# Patient Record
Sex: MALE | Race: WHITE | Employment: FULL TIME | URBAN - NONMETROPOLITAN AREA
[De-identification: names, ages, dates, MRNs, and addresses within clinical notes are randomized per-mention and may not be internally consistent; named-entity substitution may affect disease eponyms.]

---

## 2017-04-12 DIAGNOSIS — M51.26 LUMBAR HERNIATED DISC: ICD-10-CM

## 2017-04-12 DIAGNOSIS — M54.42 ACUTE LEFT-SIDED LOW BACK PAIN WITH LEFT-SIDED SCIATICA: ICD-10-CM

## 2017-04-12 RX ORDER — TIZANIDINE 4 MG/1
4 TABLET ORAL 3 TIMES DAILY
Qty: 30 TABLET | Refills: 0 | Status: SHIPPED | OUTPATIENT
Start: 2017-04-12 | End: 2017-08-29 | Stop reason: ALTCHOICE

## 2017-05-08 DIAGNOSIS — F41.9 ANXIETY: ICD-10-CM

## 2017-05-08 DIAGNOSIS — R06.2 WHEEZE: ICD-10-CM

## 2017-05-08 RX ORDER — ALBUTEROL SULFATE 2.5 MG/3ML
2.5 SOLUTION RESPIRATORY (INHALATION) EVERY 4 HOURS PRN
Qty: 50 VIAL | Refills: 3 | Status: SHIPPED | OUTPATIENT
Start: 2017-05-08 | End: 2019-02-06 | Stop reason: SDUPTHER

## 2017-05-08 RX ORDER — SERTRALINE HYDROCHLORIDE 25 MG/1
25 TABLET, FILM COATED ORAL DAILY
Qty: 30 TABLET | Refills: 3 | Status: SHIPPED | OUTPATIENT
Start: 2017-05-08 | End: 2017-10-11 | Stop reason: SDUPTHER

## 2017-06-30 ENCOUNTER — APPOINTMENT (OUTPATIENT)
Dept: GENERAL RADIOLOGY | Age: 47
End: 2017-06-30
Payer: COMMERCIAL

## 2017-06-30 ENCOUNTER — HOSPITAL ENCOUNTER (EMERGENCY)
Age: 47
Discharge: ANOTHER HOSPITAL | End: 2017-06-30
Attending: FAMILY MEDICINE
Payer: COMMERCIAL

## 2017-06-30 ENCOUNTER — HOSPITAL ENCOUNTER (INPATIENT)
Age: 47
LOS: 2 days | Discharge: HOME | DRG: 192 | End: 2017-07-02
Attending: INTERNAL MEDICINE | Admitting: INTERNAL MEDICINE
Payer: COMMERCIAL

## 2017-06-30 VITALS
DIASTOLIC BLOOD PRESSURE: 89 MMHG | RESPIRATION RATE: 19 BRPM | WEIGHT: 240 LBS | HEIGHT: 72 IN | SYSTOLIC BLOOD PRESSURE: 146 MMHG | OXYGEN SATURATION: 96 % | HEART RATE: 74 BPM | BODY MASS INDEX: 32.51 KG/M2 | TEMPERATURE: 97.8 F

## 2017-06-30 DIAGNOSIS — Z86.79 HISTORY OF CORONARY ARTERY DISEASE: ICD-10-CM

## 2017-06-30 DIAGNOSIS — Z95.5 HISTORY OF CORONARY ARTERY STENT PLACEMENT: ICD-10-CM

## 2017-06-30 DIAGNOSIS — I20.0 UNSTABLE ANGINA (HCC): Primary | ICD-10-CM

## 2017-06-30 LAB
ABSOLUTE EOS #: 0.2 K/UL (ref 0–0.4)
ABSOLUTE LYMPH #: 2 K/UL (ref 1–4.8)
ABSOLUTE MONO #: 0.5 K/UL (ref 0–1)
ANION GAP SERPL CALCULATED.3IONS-SCNC: 14 MMOL/L (ref 9–17)
BASOPHILS # BLD: 1 %
BASOPHILS ABSOLUTE: 0 K/UL (ref 0–0.2)
BNP INTERPRETATION: NORMAL
BUN BLDV-MCNC: 11 MG/DL (ref 6–20)
BUN/CREAT BLD: 14 (ref 9–20)
CALCIUM SERPL-MCNC: 9.7 MG/DL (ref 8.6–10.4)
CHLORIDE BLD-SCNC: 98 MMOL/L (ref 98–107)
CO2: 25 MMOL/L (ref 20–31)
CREAT SERPL-MCNC: 0.76 MG/DL (ref 0.7–1.2)
D-DIMER QUANTITATIVE: 0.5 MG/L FEU (ref 0.19–0.5)
DIFFERENTIAL TYPE: NORMAL
EOSINOPHILS RELATIVE PERCENT: 2 %
GFR AFRICAN AMERICAN: >60 ML/MIN
GFR NON-AFRICAN AMERICAN: >60 ML/MIN
GFR SERPL CREATININE-BSD FRML MDRD: ABNORMAL ML/MIN/{1.73_M2}
GFR SERPL CREATININE-BSD FRML MDRD: ABNORMAL ML/MIN/{1.73_M2}
GLUCOSE BLD-MCNC: 138 MG/DL (ref 70–99)
HCT VFR BLD CALC: 42.5 % (ref 41–53)
HEMOGLOBIN: 14.6 G/DL (ref 13.5–17)
INR BLD: 1 (ref 0.9–1.2)
LYMPHOCYTES # BLD: 25 %
MCH RBC QN AUTO: 32.2 PG (ref 26–34)
MCHC RBC AUTO-ENTMCNC: 34.4 G/DL (ref 31–37)
MCV RBC AUTO: 93.8 FL (ref 80–100)
MONOCYTES # BLD: 6 %
PDW BLD-RTO: 13.1 % (ref 12.1–15.2)
PLATELET # BLD: 195 K/UL (ref 140–450)
PLATELET ESTIMATE: NORMAL
PMV BLD AUTO: NORMAL FL (ref 6–12)
POTASSIUM SERPL-SCNC: 4.1 MMOL/L (ref 3.7–5.3)
PRO-BNP: 54 PG/ML
PROTHROMBIN TIME: 9.8 SEC (ref 9.7–12.2)
RBC # BLD: 4.53 M/UL (ref 4.5–5.9)
RBC # BLD: NORMAL 10*6/UL
SEG NEUTROPHILS: 66 %
SEGMENTED NEUTROPHILS ABSOLUTE COUNT: 5.5 K/UL (ref 1.8–7.7)
SODIUM BLD-SCNC: 137 MMOL/L (ref 135–144)
TROPONIN INTERP: NORMAL
TROPONIN T: <0.03 NG/ML
WBC # BLD: 8.2 K/UL (ref 3.5–11)
WBC # BLD: NORMAL 10*3/UL

## 2017-06-30 PROCEDURE — 84484 ASSAY OF TROPONIN QUANT: CPT

## 2017-06-30 PROCEDURE — 36415 COLL VENOUS BLD VENIPUNCTURE: CPT

## 2017-06-30 PROCEDURE — 6360000002 HC RX W HCPCS: Performed by: FAMILY MEDICINE

## 2017-06-30 PROCEDURE — 1200000000 HC SEMI PRIVATE

## 2017-06-30 PROCEDURE — 2500000003 HC RX 250 WO HCPCS: Performed by: FAMILY MEDICINE

## 2017-06-30 PROCEDURE — 71010 XR CHEST PORTABLE: CPT

## 2017-06-30 PROCEDURE — 85379 FIBRIN DEGRADATION QUANT: CPT

## 2017-06-30 PROCEDURE — 6370000000 HC RX 637 (ALT 250 FOR IP): Performed by: FAMILY MEDICINE

## 2017-06-30 PROCEDURE — 85610 PROTHROMBIN TIME: CPT

## 2017-06-30 PROCEDURE — 96375 TX/PRO/DX INJ NEW DRUG ADDON: CPT

## 2017-06-30 PROCEDURE — 80048 BASIC METABOLIC PNL TOTAL CA: CPT

## 2017-06-30 PROCEDURE — G0378 HOSPITAL OBSERVATION PER HR: HCPCS

## 2017-06-30 PROCEDURE — 85025 COMPLETE CBC W/AUTO DIFF WBC: CPT

## 2017-06-30 PROCEDURE — 93005 ELECTROCARDIOGRAM TRACING: CPT

## 2017-06-30 PROCEDURE — 83880 ASSAY OF NATRIURETIC PEPTIDE: CPT

## 2017-06-30 PROCEDURE — 99285 EMERGENCY DEPT VISIT HI MDM: CPT

## 2017-06-30 PROCEDURE — 96365 THER/PROPH/DIAG IV INF INIT: CPT

## 2017-06-30 RX ORDER — SERTRALINE HYDROCHLORIDE 25 MG/1
25 TABLET, FILM COATED ORAL DAILY
Status: DISCONTINUED | OUTPATIENT
Start: 2017-07-01 | End: 2017-07-02 | Stop reason: HOSPADM

## 2017-06-30 RX ORDER — LORAZEPAM 2 MG/ML
0.5 INJECTION INTRAMUSCULAR ONCE
Status: COMPLETED | OUTPATIENT
Start: 2017-06-30 | End: 2017-06-30

## 2017-06-30 RX ORDER — LOSARTAN POTASSIUM AND HYDROCHLOROTHIAZIDE 25; 100 MG/1; MG/1
1 TABLET ORAL DAILY
Status: DISCONTINUED | OUTPATIENT
Start: 2017-07-01 | End: 2017-06-30 | Stop reason: CLARIF

## 2017-06-30 RX ORDER — CLOPIDOGREL BISULFATE 75 MG/1
75 TABLET ORAL DAILY
Status: DISCONTINUED | OUTPATIENT
Start: 2017-07-01 | End: 2017-07-02 | Stop reason: HOSPADM

## 2017-06-30 RX ORDER — POTASSIUM CHLORIDE 20 MEQ/1
40 TABLET, EXTENDED RELEASE ORAL PRN
Status: DISCONTINUED | OUTPATIENT
Start: 2017-06-30 | End: 2017-07-02 | Stop reason: HOSPADM

## 2017-06-30 RX ORDER — SODIUM CHLORIDE 0.9 % (FLUSH) 0.9 %
10 SYRINGE (ML) INJECTION PRN
Status: DISCONTINUED | OUTPATIENT
Start: 2017-06-30 | End: 2017-07-02 | Stop reason: HOSPADM

## 2017-06-30 RX ORDER — SODIUM CHLORIDE 9 MG/ML
INJECTION, SOLUTION INTRAVENOUS CONTINUOUS
Status: DISCONTINUED | OUTPATIENT
Start: 2017-06-30 | End: 2017-07-02 | Stop reason: HOSPADM

## 2017-06-30 RX ORDER — NITROGLYCERIN 20 MG/100ML
5 INJECTION INTRAVENOUS CONTINUOUS
Status: DISCONTINUED | OUTPATIENT
Start: 2017-06-30 | End: 2017-07-02 | Stop reason: HOSPADM

## 2017-06-30 RX ORDER — ALBUTEROL SULFATE 2.5 MG/3ML
2.5 SOLUTION RESPIRATORY (INHALATION) EVERY 6 HOURS PRN
Status: DISCONTINUED | OUTPATIENT
Start: 2017-06-30 | End: 2017-07-02 | Stop reason: HOSPADM

## 2017-06-30 RX ORDER — POTASSIUM CHLORIDE 7.45 MG/ML
10 INJECTION INTRAVENOUS PRN
Status: DISCONTINUED | OUTPATIENT
Start: 2017-06-30 | End: 2017-07-02 | Stop reason: HOSPADM

## 2017-06-30 RX ORDER — MORPHINE SULFATE 4 MG/ML
4 INJECTION, SOLUTION INTRAMUSCULAR; INTRAVENOUS
Status: DISCONTINUED | OUTPATIENT
Start: 2017-06-30 | End: 2017-07-02 | Stop reason: HOSPADM

## 2017-06-30 RX ORDER — BISACODYL 10 MG
10 SUPPOSITORY, RECTAL RECTAL DAILY PRN
Status: DISCONTINUED | OUTPATIENT
Start: 2017-06-30 | End: 2017-07-02 | Stop reason: HOSPADM

## 2017-06-30 RX ORDER — SODIUM CHLORIDE 0.9 % (FLUSH) 0.9 %
10 SYRINGE (ML) INJECTION EVERY 12 HOURS SCHEDULED
Status: DISCONTINUED | OUTPATIENT
Start: 2017-06-30 | End: 2017-07-02 | Stop reason: HOSPADM

## 2017-06-30 RX ORDER — MAGNESIUM SULFATE 1 G/100ML
1 INJECTION INTRAVENOUS PRN
Status: DISCONTINUED | OUTPATIENT
Start: 2017-06-30 | End: 2017-07-02 | Stop reason: HOSPADM

## 2017-06-30 RX ORDER — ACETAMINOPHEN 325 MG/1
650 TABLET ORAL EVERY 4 HOURS PRN
Status: DISCONTINUED | OUTPATIENT
Start: 2017-06-30 | End: 2017-07-02 | Stop reason: HOSPADM

## 2017-06-30 RX ORDER — ALBUTEROL SULFATE 2.5 MG/3ML
2.5 SOLUTION RESPIRATORY (INHALATION)
Status: DISCONTINUED | OUTPATIENT
Start: 2017-06-30 | End: 2017-06-30

## 2017-06-30 RX ORDER — LOSARTAN POTASSIUM 50 MG/1
100 TABLET ORAL DAILY
Status: DISCONTINUED | OUTPATIENT
Start: 2017-07-01 | End: 2017-07-02 | Stop reason: HOSPADM

## 2017-06-30 RX ORDER — ASPIRIN 81 MG/1
324 TABLET, CHEWABLE ORAL ONCE
Status: COMPLETED | OUTPATIENT
Start: 2017-06-30 | End: 2017-06-30

## 2017-06-30 RX ORDER — HYDROCODONE BITARTRATE AND ACETAMINOPHEN 5; 325 MG/1; MG/1
1 TABLET ORAL EVERY 4 HOURS PRN
Status: DISCONTINUED | OUTPATIENT
Start: 2017-06-30 | End: 2017-07-02 | Stop reason: HOSPADM

## 2017-06-30 RX ORDER — NITROGLYCERIN 20 MG/100ML
5 INJECTION INTRAVENOUS CONTINUOUS
Status: DISCONTINUED | OUTPATIENT
Start: 2017-06-30 | End: 2017-06-30 | Stop reason: HOSPADM

## 2017-06-30 RX ORDER — HYDROCHLOROTHIAZIDE 25 MG/1
25 TABLET ORAL DAILY
Status: DISCONTINUED | OUTPATIENT
Start: 2017-07-01 | End: 2017-07-02 | Stop reason: HOSPADM

## 2017-06-30 RX ORDER — ONDANSETRON 2 MG/ML
4 INJECTION INTRAMUSCULAR; INTRAVENOUS EVERY 6 HOURS PRN
Status: DISCONTINUED | OUTPATIENT
Start: 2017-06-30 | End: 2017-07-02 | Stop reason: HOSPADM

## 2017-06-30 RX ORDER — MORPHINE SULFATE 2 MG/ML
2 INJECTION, SOLUTION INTRAMUSCULAR; INTRAVENOUS
Status: DISCONTINUED | OUTPATIENT
Start: 2017-06-30 | End: 2017-07-02 | Stop reason: HOSPADM

## 2017-06-30 RX ORDER — DOCUSATE SODIUM 100 MG/1
100 CAPSULE, LIQUID FILLED ORAL 2 TIMES DAILY
Status: DISCONTINUED | OUTPATIENT
Start: 2017-06-30 | End: 2017-07-02 | Stop reason: HOSPADM

## 2017-06-30 RX ORDER — NEBIVOLOL 5 MG/1
10 TABLET ORAL DAILY
Status: DISCONTINUED | OUTPATIENT
Start: 2017-07-01 | End: 2017-07-02 | Stop reason: HOSPADM

## 2017-06-30 RX ORDER — PANTOPRAZOLE SODIUM 40 MG/1
40 TABLET, DELAYED RELEASE ORAL DAILY
Status: DISCONTINUED | OUTPATIENT
Start: 2017-07-01 | End: 2017-07-02 | Stop reason: HOSPADM

## 2017-06-30 RX ORDER — POTASSIUM CHLORIDE 20MEQ/15ML
40 LIQUID (ML) ORAL PRN
Status: DISCONTINUED | OUTPATIENT
Start: 2017-06-30 | End: 2017-07-02 | Stop reason: HOSPADM

## 2017-06-30 RX ADMIN — ASPIRIN 81 MG 324 MG: 81 TABLET ORAL at 19:01

## 2017-06-30 RX ADMIN — NITROGLYCERIN 0.5 INCH: 20 OINTMENT TOPICAL at 19:01

## 2017-06-30 RX ADMIN — LORAZEPAM 0.5 MG: 2 INJECTION INTRAMUSCULAR; INTRAVENOUS at 20:34

## 2017-06-30 RX ADMIN — NITROGLYCERIN 5 MCG/MIN: 20 INJECTION INTRAVENOUS at 19:57

## 2017-06-30 ASSESSMENT — PAIN SCALES - GENERAL
PAINLEVEL_OUTOF10: 4
PAINLEVEL_OUTOF10: 3
PAINLEVEL_OUTOF10: 4

## 2017-06-30 ASSESSMENT — PAIN DESCRIPTION - ORIENTATION
ORIENTATION: LEFT;MID
ORIENTATION: MID;LEFT

## 2017-06-30 ASSESSMENT — PAIN DESCRIPTION - DESCRIPTORS
DESCRIPTORS: HEAVINESS
DESCRIPTORS: DISCOMFORT;CONSTANT

## 2017-06-30 ASSESSMENT — PAIN DESCRIPTION - ONSET: ONSET: ON-GOING

## 2017-06-30 ASSESSMENT — PAIN DESCRIPTION - PAIN TYPE
TYPE: ACUTE PAIN

## 2017-06-30 ASSESSMENT — PAIN DESCRIPTION - DIRECTION: RADIATING_TOWARDS: LEFT BACK

## 2017-06-30 ASSESSMENT — PAIN DESCRIPTION - FREQUENCY: FREQUENCY: CONTINUOUS

## 2017-06-30 ASSESSMENT — PAIN DESCRIPTION - LOCATION
LOCATION: CHEST

## 2017-06-30 ASSESSMENT — PAIN DESCRIPTION - PROGRESSION: CLINICAL_PROGRESSION: GRADUALLY IMPROVING

## 2017-07-01 PROBLEM — I20.0 UNSTABLE ANGINA (HCC): Status: ACTIVE | Noted: 2017-07-01

## 2017-07-01 PROBLEM — R07.2 PRECORDIAL CHEST PAIN: Status: ACTIVE | Noted: 2017-07-01

## 2017-07-01 PROBLEM — F17.200 SMOKER: Status: ACTIVE | Noted: 2017-07-01

## 2017-07-01 LAB
ALBUMIN SERPL-MCNC: 4 G/DL (ref 3.5–5.2)
ALBUMIN/GLOBULIN RATIO: 1.5 (ref 1–2.5)
ALP BLD-CCNC: 48 U/L (ref 40–129)
ALT SERPL-CCNC: 28 U/L (ref 5–41)
ANION GAP SERPL CALCULATED.3IONS-SCNC: 15 MMOL/L (ref 9–17)
AST SERPL-CCNC: 21 U/L
BILIRUB SERPL-MCNC: 0.41 MG/DL (ref 0.3–1.2)
BUN BLDV-MCNC: 9 MG/DL (ref 6–20)
BUN/CREAT BLD: ABNORMAL (ref 9–20)
CALCIUM SERPL-MCNC: 9 MG/DL (ref 8.6–10.4)
CHLORIDE BLD-SCNC: 100 MMOL/L (ref 98–107)
CHOLESTEROL/HDL RATIO: 3.3
CHOLESTEROL: 166 MG/DL
CO2: 25 MMOL/L (ref 20–31)
CREAT SERPL-MCNC: 0.62 MG/DL (ref 0.7–1.2)
EKG ATRIAL RATE: 71 BPM
EKG P AXIS: 40 DEGREES
EKG P-R INTERVAL: 166 MS
EKG Q-T INTERVAL: 378 MS
EKG QRS DURATION: 82 MS
EKG QTC CALCULATION (BAZETT): 410 MS
EKG R AXIS: 27 DEGREES
EKG T AXIS: 26 DEGREES
EKG VENTRICULAR RATE: 71 BPM
GFR AFRICAN AMERICAN: >60 ML/MIN
GFR NON-AFRICAN AMERICAN: >60 ML/MIN
GFR SERPL CREATININE-BSD FRML MDRD: ABNORMAL ML/MIN/{1.73_M2}
GFR SERPL CREATININE-BSD FRML MDRD: ABNORMAL ML/MIN/{1.73_M2}
GLUCOSE BLD-MCNC: 134 MG/DL (ref 70–99)
HCT VFR BLD CALC: 40.2 % (ref 41–53)
HDLC SERPL-MCNC: 50 MG/DL
HEMOGLOBIN: 13.6 G/DL (ref 13.5–17.5)
LDL CHOLESTEROL: 61 MG/DL (ref 0–130)
MCH RBC QN AUTO: 31.9 PG (ref 26–34)
MCHC RBC AUTO-ENTMCNC: 33.9 G/DL (ref 31–37)
MCV RBC AUTO: 94.1 FL (ref 80–100)
PDW BLD-RTO: 13.1 % (ref 12.5–15.4)
PLATELET # BLD: 184 K/UL (ref 140–450)
PMV BLD AUTO: 8.7 FL (ref 6–12)
POTASSIUM SERPL-SCNC: 4 MMOL/L (ref 3.7–5.3)
RBC # BLD: 4.27 M/UL (ref 4.5–5.9)
SODIUM BLD-SCNC: 140 MMOL/L (ref 135–144)
TOTAL PROTEIN: 6.7 G/DL (ref 6.4–8.3)
TRIGL SERPL-MCNC: 277 MG/DL
TROPONIN INTERP: NORMAL
TROPONIN INTERP: NORMAL
TROPONIN T: <0.03 NG/ML
TROPONIN T: <0.03 NG/ML
VLDLC SERPL CALC-MCNC: ABNORMAL MG/DL (ref 1–30)
WBC # BLD: 6.6 K/UL (ref 3.5–11)

## 2017-07-01 PROCEDURE — 2580000003 HC RX 258: Performed by: NURSE PRACTITIONER

## 2017-07-01 PROCEDURE — 80061 LIPID PANEL: CPT

## 2017-07-01 PROCEDURE — 99223 1ST HOSP IP/OBS HIGH 75: CPT | Performed by: FAMILY MEDICINE

## 2017-07-01 PROCEDURE — 93005 ELECTROCARDIOGRAM TRACING: CPT

## 2017-07-01 PROCEDURE — 6360000002 HC RX W HCPCS: Performed by: NURSE PRACTITIONER

## 2017-07-01 PROCEDURE — 85027 COMPLETE CBC AUTOMATED: CPT

## 2017-07-01 PROCEDURE — 2500000003 HC RX 250 WO HCPCS: Performed by: NURSE PRACTITIONER

## 2017-07-01 PROCEDURE — 36415 COLL VENOUS BLD VENIPUNCTURE: CPT

## 2017-07-01 PROCEDURE — 1200000000 HC SEMI PRIVATE

## 2017-07-01 PROCEDURE — G0378 HOSPITAL OBSERVATION PER HR: HCPCS

## 2017-07-01 PROCEDURE — 80053 COMPREHEN METABOLIC PANEL: CPT

## 2017-07-01 PROCEDURE — 84484 ASSAY OF TROPONIN QUANT: CPT

## 2017-07-01 PROCEDURE — 6370000000 HC RX 637 (ALT 250 FOR IP): Performed by: NURSE PRACTITIONER

## 2017-07-01 RX ORDER — NICOTINE 21 MG/24HR
1 PATCH, TRANSDERMAL 24 HOURS TRANSDERMAL DAILY
Status: DISCONTINUED | OUTPATIENT
Start: 2017-07-01 | End: 2017-07-02 | Stop reason: HOSPADM

## 2017-07-01 RX ADMIN — DOCUSATE SODIUM 100 MG: 100 CAPSULE ORAL at 09:47

## 2017-07-01 RX ADMIN — SERTRALINE 25 MG: 25 TABLET, FILM COATED ORAL at 09:51

## 2017-07-01 RX ADMIN — ASPIRIN 325 MG: 325 TABLET, COATED ORAL at 09:50

## 2017-07-01 RX ADMIN — HYDROCHLOROTHIAZIDE 25 MG: 25 TABLET ORAL at 09:51

## 2017-07-01 RX ADMIN — CLOPIDOGREL 75 MG: 75 TABLET, FILM COATED ORAL at 09:48

## 2017-07-01 RX ADMIN — NITROGLYCERIN 20 MCG/MIN: 20 INJECTION INTRAVENOUS at 04:40

## 2017-07-01 RX ADMIN — NEBIVOLOL HYDROCHLORIDE 10 MG: 5 TABLET ORAL at 09:50

## 2017-07-01 RX ADMIN — SODIUM CHLORIDE: 9 INJECTION, SOLUTION INTRAVENOUS at 04:41

## 2017-07-01 RX ADMIN — PANTOPRAZOLE SODIUM 40 MG: 40 TABLET, DELAYED RELEASE ORAL at 09:47

## 2017-07-01 RX ADMIN — Medication 10 ML: at 22:16

## 2017-07-01 RX ADMIN — LOSARTAN POTASSIUM 100 MG: 50 TABLET, FILM COATED ORAL at 09:48

## 2017-07-01 RX ADMIN — ENOXAPARIN SODIUM 40 MG: 40 INJECTION SUBCUTANEOUS at 09:51

## 2017-07-01 ASSESSMENT — ENCOUNTER SYMPTOMS
CHEST TIGHTNESS: 1
SINUS PRESSURE: 0
RECTAL PAIN: 0
BACK PAIN: 0
ABDOMINAL PAIN: 0
VOMITING: 0
BLOOD IN STOOL: 0
COUGH: 0
SORE THROAT: 0
DIARRHEA: 0
SHORTNESS OF BREATH: 0
NAUSEA: 0
WHEEZING: 0
EYE PAIN: 0
CONSTIPATION: 0

## 2017-07-01 ASSESSMENT — PAIN SCALES - GENERAL
PAINLEVEL_OUTOF10: 2
PAINLEVEL_OUTOF10: 0
PAINLEVEL_OUTOF10: 1
PAINLEVEL_OUTOF10: 0
PAINLEVEL_OUTOF10: 1

## 2017-07-01 ASSESSMENT — PAIN DESCRIPTION - PAIN TYPE
TYPE: ACUTE PAIN
TYPE: ACUTE PAIN

## 2017-07-01 ASSESSMENT — PAIN DESCRIPTION - DIRECTION
RADIATING_TOWARDS: ABDOMEN, BACK
RADIATING_TOWARDS: BACK, ABDOMEN

## 2017-07-01 ASSESSMENT — PAIN DESCRIPTION - PROGRESSION
CLINICAL_PROGRESSION: GRADUALLY IMPROVING

## 2017-07-01 ASSESSMENT — PAIN DESCRIPTION - DESCRIPTORS
DESCRIPTORS: HEAVINESS
DESCRIPTORS: HEAVINESS

## 2017-07-01 ASSESSMENT — PAIN DESCRIPTION - ONSET
ONSET: OTHER (COMMENT)
ONSET: OTHER (COMMENT)

## 2017-07-01 ASSESSMENT — PAIN DESCRIPTION - ORIENTATION
ORIENTATION: MID
ORIENTATION: MID

## 2017-07-01 ASSESSMENT — PAIN DESCRIPTION - LOCATION
LOCATION: CHEST
LOCATION: CHEST

## 2017-07-02 VITALS
HEART RATE: 66 BPM | TEMPERATURE: 98.3 F | HEIGHT: 72 IN | SYSTOLIC BLOOD PRESSURE: 130 MMHG | RESPIRATION RATE: 20 BRPM | DIASTOLIC BLOOD PRESSURE: 62 MMHG | BODY MASS INDEX: 33.68 KG/M2 | WEIGHT: 248.68 LBS | OXYGEN SATURATION: 93 %

## 2017-07-02 PROBLEM — J20.9 ACUTE BRONCHITIS: Status: ACTIVE | Noted: 2017-07-02

## 2017-07-02 PROCEDURE — 99239 HOSP IP/OBS DSCHRG MGMT >30: CPT | Performed by: FAMILY MEDICINE

## 2017-07-02 PROCEDURE — G0378 HOSPITAL OBSERVATION PER HR: HCPCS

## 2017-07-02 RX ORDER — AZITHROMYCIN 250 MG/1
500 TABLET, FILM COATED ORAL ONCE
Status: DISCONTINUED | OUTPATIENT
Start: 2017-07-02 | End: 2017-07-02

## 2017-07-02 RX ORDER — NICOTINE 21 MG/24HR
1 PATCH, TRANSDERMAL 24 HOURS TRANSDERMAL DAILY
Qty: 30 PATCH | Refills: 3 | Status: SHIPPED | OUTPATIENT
Start: 2017-07-02 | End: 2017-10-23 | Stop reason: ALTCHOICE

## 2017-07-02 RX ORDER — AZITHROMYCIN 250 MG/1
TABLET, FILM COATED ORAL
Qty: 1 PACKET | Refills: 0 | Status: SHIPPED | OUTPATIENT
Start: 2017-07-02 | End: 2017-07-12

## 2017-07-02 RX ORDER — ASPIRIN 81 MG/1
81 TABLET ORAL DAILY
Qty: 30 TABLET | Refills: 3 | Status: SHIPPED | OUTPATIENT
Start: 2017-07-02

## 2017-07-02 ASSESSMENT — ENCOUNTER SYMPTOMS
SHORTNESS OF BREATH: 0
CONSTIPATION: 0
NAUSEA: 0
ABDOMINAL PAIN: 0
WHEEZING: 0
COUGH: 1
VOMITING: 0
DIARRHEA: 0

## 2017-07-03 LAB
EKG ATRIAL RATE: 78 BPM
EKG P AXIS: 52 DEGREES
EKG P-R INTERVAL: 164 MS
EKG Q-T INTERVAL: 374 MS
EKG QRS DURATION: 90 MS
EKG QTC CALCULATION (BAZETT): 426 MS
EKG R AXIS: 39 DEGREES
EKG T AXIS: 40 DEGREES
EKG VENTRICULAR RATE: 78 BPM

## 2017-08-29 ENCOUNTER — TELEPHONE (OUTPATIENT)
Dept: PHARMACY | Facility: CLINIC | Age: 47
End: 2017-08-29

## 2017-09-01 ENCOUNTER — TELEPHONE (OUTPATIENT)
Dept: FAMILY MEDICINE CLINIC | Age: 47
End: 2017-09-01

## 2017-09-01 DIAGNOSIS — J44.9 CHRONIC OBSTRUCTIVE PULMONARY DISEASE, UNSPECIFIED COPD TYPE (HCC): Primary | ICD-10-CM

## 2017-09-01 DIAGNOSIS — J44.1 COPD EXACERBATION (HCC): ICD-10-CM

## 2017-09-01 RX ORDER — METHYLPREDNISOLONE 4 MG/1
TABLET ORAL
Qty: 1 KIT | Refills: 0 | Status: SHIPPED | OUTPATIENT
Start: 2017-09-01 | End: 2017-09-07

## 2017-09-01 RX ORDER — AZITHROMYCIN 250 MG/1
TABLET, FILM COATED ORAL
Qty: 6 TABLET | Refills: 0 | Status: SHIPPED | OUTPATIENT
Start: 2017-09-01 | End: 2017-09-11

## 2017-10-11 DIAGNOSIS — Z00.00 ROUTINE HEALTH MAINTENANCE: ICD-10-CM

## 2017-10-11 DIAGNOSIS — F41.9 ANXIETY: ICD-10-CM

## 2017-10-11 DIAGNOSIS — I10 ESSENTIAL HYPERTENSION: Primary | Chronic | ICD-10-CM

## 2017-10-11 DIAGNOSIS — E78.1 HYPERTRIGLYCERIDEMIA: Chronic | ICD-10-CM

## 2017-10-11 DIAGNOSIS — R73.01 IMPAIRED FASTING BLOOD SUGAR: ICD-10-CM

## 2017-10-11 RX ORDER — SERTRALINE HYDROCHLORIDE 25 MG/1
25 TABLET, FILM COATED ORAL DAILY
Qty: 30 TABLET | Refills: 3 | Status: SHIPPED | OUTPATIENT
Start: 2017-10-11 | End: 2018-03-09 | Stop reason: SDUPTHER

## 2017-10-11 NOTE — TELEPHONE ENCOUNTER
LOV 11/24/15  LRF 5/8/17    Patient due for follow up and labs. Patient's wife will schedule physical.   Health Maintenance   Topic Date Due    HIV screen  01/30/1987    DTaP/Tdap/Td vaccine (1 - Tdap) 01/30/1991    Flu vaccine (1) 09/01/2017    Diabetes screen  11/24/2018    Lipid screen  07/01/2022    Pneumococcal med risk  Completed             (applicable per patient's age: Cancer Screenings, Depression Screening, Fall Risk Screening, Immunizations)    Hemoglobin A1C (%)   Date Value   11/24/2015 6.3 (H)     LDL Cholesterol (mg/dL)   Date Value   07/01/2017 61     AST (U/L)   Date Value   07/01/2017 21     ALT (U/L)   Date Value   07/01/2017 28     BUN (mg/dL)   Date Value   07/01/2017 9      (goal A1C is < 7)   (goal LDL is <100) need 30-50% reduction from baseline     BP Readings from Last 3 Encounters:   07/02/17 130/62   06/30/17 (!) 146/89   10/07/16 (!) 130/100    (goal /80)      All Future Testing planned in [facility]:  Lab Frequency Next Occurrence       Next Visit Date:  No future appointments.          Patient Active Problem List:     Hypertriglyceridemia     Hypertension     GERD (gastroesophageal reflux disease)     CAD (coronary artery disease)     Presence of drug coated stent in right coronary artery     S/P cardiac catheterization  3/27/2015 [facility]     Lumbar disc herniation with radiculopathy     Impaired fasting blood sugar     Unstable angina (HCC)     COPD (chronic obstructive pulmonary disease) (HCC)     Precordial chest pain     Smoker     Acute bronchitis

## 2017-10-23 ENCOUNTER — OFFICE VISIT (OUTPATIENT)
Dept: FAMILY MEDICINE CLINIC | Age: 47
End: 2017-10-23
Payer: COMMERCIAL

## 2017-10-23 VITALS
DIASTOLIC BLOOD PRESSURE: 92 MMHG | SYSTOLIC BLOOD PRESSURE: 138 MMHG | HEART RATE: 76 BPM | HEIGHT: 70 IN | RESPIRATION RATE: 20 BRPM | WEIGHT: 251 LBS | TEMPERATURE: 97.8 F | BODY MASS INDEX: 35.93 KG/M2

## 2017-10-23 DIAGNOSIS — Z23 NEED FOR INFLUENZA VACCINATION: ICD-10-CM

## 2017-10-23 DIAGNOSIS — Z11.4 SCREENING FOR HIV (HUMAN IMMUNODEFICIENCY VIRUS): ICD-10-CM

## 2017-10-23 DIAGNOSIS — Z00.00 ROUTINE PHYSICAL EXAMINATION: Primary | ICD-10-CM

## 2017-10-23 DIAGNOSIS — Z23 NEED FOR TDAP VACCINATION: ICD-10-CM

## 2017-10-23 DIAGNOSIS — L91.0 HYPERTROPHIC SCAR: ICD-10-CM

## 2017-10-23 DIAGNOSIS — I25.10 CORONARY ARTERY DISEASE INVOLVING NATIVE CORONARY ARTERY OF NATIVE HEART WITHOUT ANGINA PECTORIS: ICD-10-CM

## 2017-10-23 DIAGNOSIS — J44.9 CHRONIC OBSTRUCTIVE PULMONARY DISEASE, UNSPECIFIED COPD TYPE (HCC): ICD-10-CM

## 2017-10-23 DIAGNOSIS — L98.9 SKIN LESION: ICD-10-CM

## 2017-10-23 DIAGNOSIS — I10 ESSENTIAL HYPERTENSION: Chronic | ICD-10-CM

## 2017-10-23 DIAGNOSIS — F41.9 ANXIETY: ICD-10-CM

## 2017-10-23 DIAGNOSIS — F17.200 TOBACCO USE DISORDER: ICD-10-CM

## 2017-10-23 PROCEDURE — 90471 IMMUNIZATION ADMIN: CPT | Performed by: NURSE PRACTITIONER

## 2017-10-23 PROCEDURE — 90688 IIV4 VACCINE SPLT 0.5 ML IM: CPT | Performed by: NURSE PRACTITIONER

## 2017-10-23 PROCEDURE — 90472 IMMUNIZATION ADMIN EACH ADD: CPT | Performed by: NURSE PRACTITIONER

## 2017-10-23 PROCEDURE — 99396 PREV VISIT EST AGE 40-64: CPT | Performed by: NURSE PRACTITIONER

## 2017-10-23 PROCEDURE — 90715 TDAP VACCINE 7 YRS/> IM: CPT | Performed by: NURSE PRACTITIONER

## 2017-10-23 ASSESSMENT — ENCOUNTER SYMPTOMS
RHINORRHEA: 0
BLOOD IN STOOL: 0
ABDOMINAL PAIN: 0
CHEST TIGHTNESS: 0
WHEEZING: 0
DIARRHEA: 0
ABDOMINAL DISTENTION: 0
NAUSEA: 0
EYE PAIN: 0
PHOTOPHOBIA: 0
SHORTNESS OF BREATH: 0
COUGH: 0
BACK PAIN: 0
SORE THROAT: 0
CONSTIPATION: 0
VOMITING: 0

## 2017-10-23 ASSESSMENT — PATIENT HEALTH QUESTIONNAIRE - PHQ9
SUM OF ALL RESPONSES TO PHQ QUESTIONS 1-9: 0
1. LITTLE INTEREST OR PLEASURE IN DOING THINGS: 0
SUM OF ALL RESPONSES TO PHQ9 QUESTIONS 1 & 2: 0
2. FEELING DOWN, DEPRESSED OR HOPELESS: 0

## 2017-10-23 NOTE — PATIENT INSTRUCTIONS
Patient Education        Well Visit, Ages 25 to 48: Care Instructions  Your Care Instructions  Physical exams can help you stay healthy. Your doctor has checked your overall health and may have suggested ways to take good care of yourself. He or she also may have recommended tests. At home, you can help prevent illness with healthy eating, regular exercise, and other steps. Follow-up care is a key part of your treatment and safety. Be sure to make and go to all appointments, and call your doctor if you are having problems. It's also a good idea to know your test results and keep a list of the medicines you take. How can you care for yourself at home? · Reach and stay at a healthy weight. This will lower your risk for many problems, such as obesity, diabetes, heart disease, and high blood pressure. · Get at least 30 minutes of physical activity on most days of the week. Walking is a good choice. You also may want to do other activities, such as running, swimming, cycling, or playing tennis or team sports. Discuss any changes in your exercise program with your doctor. · Do not smoke or allow others to smoke around you. If you need help quitting, talk to your doctor about stop-smoking programs and medicines. These can increase your chances of quitting for good. · Talk to your doctor about whether you have any risk factors for sexually transmitted infections (STIs). Having one sex partner (who does not have STIs and does not have sex with anyone else) is a good way to avoid these infections. · Use birth control if you do not want to have children at this time. Talk with your doctor about the choices available and what might be best for you. · Protect your skin from too much sun. When you're outdoors from 10 a.m. to 4 p.m., stay in the shade or cover up with clothing and a hat with a wide brim. Wear sunglasses that block UV rays.  Even when it's cloudy, put broad-spectrum sunscreen (SPF 30 or higher) on any exposed skin. · See a dentist one or two times a year for checkups and to have your teeth cleaned. · Wear a seat belt in the car. · Drink alcohol in moderation, if at all. That means no more than 2 drinks a day for men and 1 drink a day for women. Follow your doctor's advice about when to have certain tests. These tests can spot problems early. For everyone  · Cholesterol. Have the fat (cholesterol) in your blood tested after age 21. Your doctor will tell you how often to have this done based on your age, family history, or other things that can increase your risk for heart disease. · Blood pressure. Have your blood pressure checked during a routine doctor visit. Your doctor will tell you how often to check your blood pressure based on your age, your blood pressure results, and other factors. · Vision. Talk with your doctor about how often to have a glaucoma test.  · Diabetes. Ask your doctor whether you should have tests for diabetes. · Colon cancer. Have a test for colon cancer at age 48. You may have one of several tests. If you are younger than 48, you may need a test earlier if you have any risk factors. Risk factors include whether you already had a precancerous polyp removed from your colon or whether your parent, brother, sister, or child has had colon cancer. For women  · Breast exam and mammogram. Talk to your doctor about when you should have a clinical breast exam and a mammogram. Medical experts differ on whether and how often women under 50 should have these tests. Your doctor can help you decide what is right for you. · Pap test and pelvic exam. Begin Pap tests at age 24. A Pap test is the best way to find cervical cancer. The test often is part of a pelvic exam. Ask how often to have this test.  · Tests for sexually transmitted infections (STIs). Ask whether you should have tests for STIs.  You may be at risk if you have sex with more than one person, especially if your partners do not wear

## 2017-10-23 NOTE — PROGRESS NOTES
(dispense nebules) 50 vial 3    nebivolol (BYSTOLIC) 10 MG tablet Take 1 tablet by mouth daily 90 tablet 3    losartan-hydrochlorothiazide (HYZAAR) 100-25 MG per tablet Take 1 tablet by mouth daily 90 tablet 3    hydrochlorothiazide (HYDRODIURIL) 25 MG tablet Take 1 tablet by mouth daily PRN swelling 30 tablet 3    clopidogrel (PLAVIX) 75 MG tablet Take 1 tablet by mouth daily 90 tablet 3    nitroGLYCERIN (NITROSTAT) 0.4 MG SL tablet Place 1 tablet under the tongue every 5 minutes as needed for Chest pain. 25 tablet 3    albuterol (PROVENTIL HFA) 108 (90 BASE) MCG/ACT inhaler Inhale 2 puffs into the lungs every 4 hours as needed for Wheezing. Generic albuterol is ok. 1 Inhaler 3    esomeprazole Magnesium (NEXIUM) 40 MG PACK Take 40 mg by mouth daily. No current facility-administered medications for this visit. Patient presents in the office today for an annual exam. Patient would like a referral to a dermatologist for some spots on his arm and upper arm from wounds that have long since healed. Patient states that skin just keeps growing over it. Patient denies any other concerns today. Patient presents to the office today for routine physical exam.  Overall, reports to be doing well. Is due for fasting labs, is not fasting this morning. Eating and drinking normally, going to the bathroom normally. Reports to be up-to-date on dental visits. Patient did have a hospitalization over the summer for unstable angina symptoms. States that he has not had any recurrence of symptoms since hospitalization. Blood pressure mildly elevated today in office. Denies any symptoms from this. Patient does have an appointment tomorrow with his cardiologist.  COPD has been stable, denies any recent issues. Anxiety/mood well-controlled on current dose of Zoloft. States he is taking and tolerating this well, denies any side effects from the medication.   Does request a referral to dermatologist.  He has 2 areas on his right arm that has been present for several years. Reports that they initially started off as ingrown hairs and he continued to pick at them. Reports that he has just had scar tissue growing over them and would like to have them removed. Does also have a small flesh-colored lesion to the right side of his scalp. Reports it has been there as long as he can remember, but it is in an awkward location. States that when he gets his hair cut it will sometimes get nicked and bleed. Will be receiving influenza and tetanus boosters today. Otherwise, denies any concerns. Is a current daily smoker. Is not interested in quitting at this time. j    Review of Systems   Constitutional: Negative for chills, fatigue, fever and unexpected weight change. Daily smoker   HENT: Negative for congestion, ear pain, postnasal drip, rhinorrhea and sore throat. Regular dental visits  No hearing concerns   Eyes: Negative for photophobia, pain and visual disturbance. No vision concerns   Respiratory: Negative for cough, chest tightness, shortness of breath and wheezing. Cardiovascular: Negative for chest pain, palpitations and leg swelling. No recent episodes of chest pain-has follow up with cardiology tomorrow   Gastrointestinal: Negative for abdominal distention, abdominal pain, blood in stool, constipation, diarrhea, nausea and vomiting. Endocrine: Negative for polydipsia and polyuria. Genitourinary: Negative for dysuria, frequency, hematuria and urgency. Musculoskeletal: Negative for back pain, gait problem, myalgias and neck stiffness. Skin: Negative for rash and wound. 2 areas of scarring to right arm  Lesion on right side of scalp-present for many years   Allergic/Immunologic: Negative for environmental allergies and food allergies. Chantix. Lipitor. Livalo. Neurological: Negative for dizziness, seizures, syncope, weakness and headaches.    Hematological: Negative for adenopathy. Psychiatric/Behavioral: Negative for dysphoric mood and suicidal ideas. The patient is not nervous/anxious. Mood is stable on Zoloft       Objective:   Physical Exam   Constitutional: He is oriented to person, place, and time. He appears well-developed. Non-toxic appearance. He does not appear ill. No distress. Obese   HENT:   Head: Normocephalic and atraumatic. Right Ear: Hearing, tympanic membrane, external ear and ear canal normal. Tympanic membrane is not erythematous. Left Ear: Hearing, external ear and ear canal normal.   Nose: Nose normal.   Mouth/Throat: Oropharynx is clear and moist. No oropharyngeal exudate or posterior oropharyngeal erythema. Partial cerumen obstruction to left canal-difficult to visualize TM   Eyes: Conjunctivae are normal. Pupils are equal, round, and reactive to light. Right eye exhibits no discharge. Left eye exhibits no discharge. Neck: Normal range of motion. No thyromegaly present. Cardiovascular: Normal rate, regular rhythm and normal heart sounds. No murmur heard. Pulses:       Carotid pulses are 2+ on the right side, and 2+ on the left side. Radial pulses are 2+ on the right side, and 2+ on the left side. Posterior tibial pulses are 2+ on the right side, and 2+ on the left side. Pulmonary/Chest: Effort normal. He has decreased breath sounds (mild diffuse). He has no wheezes. He has no rhonchi. He has no rales. Abdominal: Soft. Bowel sounds are normal. He exhibits no distension. There is no tenderness. Musculoskeletal: Normal range of motion. He exhibits no edema. No redness or swelling of joints   Lymphadenopathy:     He has no cervical adenopathy. Neurological: He is alert and oriented to person, place, and time. He displays normal reflexes. No cranial nerve deficit or sensory deficit. He exhibits normal muscle tone. Gait normal. GCS eye subscore is 4. GCS verbal subscore is 5. GCS motor subscore is 6. Skin: Skin is warm. Lesion noted. No erythema. Psychiatric: He has a normal mood and affect. His behavior is normal. Thought content normal. He expresses no homicidal and no suicidal ideation. He expresses no suicidal plans and no homicidal plans. Assessment:      1. Routine physical examination  Proceed with fasting labs as ordered. Encouraged healthy diet and daily exercise. Continue current medications. Recommend biannual dental visits. 2. Essential hypertension  Blood pressure slightly elevated today. Continue current medications. Maintain follow-up appointment tomorrow with cardiology for further recommendations. Monitor for any chest pain or shortness of breath. 3. Coronary artery disease involving native coronary artery of native heart without angina pectoris  Stable, denies any recent chest pain, continue monitoring. Continue current medications. Maintain follow-up appointment tomorrow with cardiology for further recommendations. Monitor for any chest pain or shortness of breath. 4. Anxiety  Well controlled on current medication, continue daily Zoloft. Monitor for worsening symptoms. Seek immediate medical attention with any thoughts of suicide or self-harm    5. Chronic obstructive pulmonary disease, unspecified COPD type ([facility] [address]  Stable, continue current medications, continue to monitor. Smoking cessation encouraged    6. Skin lesion  Likely benign, proceed with referral to dermatology for removal as the location interferes with haircuts and brushing hair.  - Jr Bal MD, Dermatology [facility]    7. Hypertrophic scar  Proceed with referral to dermatology for further evaluation/treatment  - Jr Bal MD, Dermatology [facility]    8. Tobacco use disorder  Smoking cessation strongly encouraged, patient not ready to quit at this time    9.  Need for influenza vaccination  - INFLUENZA, QUADV, 3 YRS AND OLDER, MICHELLE SOTO, 0.5ML (2 [facility])    10. Need for Tdap vaccination  - Tdap (age 10y-63y) IM (ADACEL)    6. Screening for HIV (human immunodeficiency virus)  - HIV Screen; Future        Plan:      Proceed with fasting labs as previously ordered. Influenza vaccine today. Tetanus booster today. Continue current medications. Encouraged healthy diet and daily exercise. Maintain follow-up appointment tomorrow with cardiology as planned. Recommend biannual dental visits. Call office with any concerns. Return in about 1 year (around 10/23/2018), or if symptoms worsen or fail to improve, for Physical/ 6 months for routine. Reed Rutherford received counseling on the following healthy behaviors: nutrition, exercise, medication adherence and tobacco cessation  Reviewed prior labs and health maintenance  Continue current medications, diet and exercise. Discussed use, benefit, and side effects of prescribed medications. Barriers to medication compliance addressed. Patient given educational materials - see patient instructions  Was a self-tracking handout given in paper form or via Bridget? No:     Requested Prescriptions      No prescriptions requested or ordered in this encounter       All patient questions answered. Patient voiced understanding. Quality Measures    Body mass index is 36.01 kg/m². Elevated. Weight control planned discussed Healthy diet and regular exercise. BP: (!) 138/92 Blood pressure is high. Treatment plan consists of No treatment change needed.     Lab Results   Component Value Date    LDLCHOLESTEROL 61 07/01/2017    LDLDIRECT  08/05/2013     Unable to report LDL Direct due to Triglycerides greater than 1200 mg/dL.    (goal LDL reduction with dx if diabetes is 50% LDL reduction)      PHQ Scores 10/23/2017   PHQ2 Score 0   PHQ9 Score 0     Interpretation of Total Score Depression Severity: 1-4 = Minimal depression, 5-9 = Mild depression, 10-14 = Moderate depression, 15-19 = Moderately severe depression, 20-27 = Severe

## 2017-11-17 ENCOUNTER — OFFICE VISIT (OUTPATIENT)
Dept: DERMATOLOGY | Age: 47
End: 2017-11-17
Payer: COMMERCIAL

## 2017-11-17 VITALS
DIASTOLIC BLOOD PRESSURE: 97 MMHG | BODY MASS INDEX: 33.26 KG/M2 | WEIGHT: 245.6 LBS | SYSTOLIC BLOOD PRESSURE: 159 MMHG | HEART RATE: 73 BPM | HEIGHT: 72 IN | OXYGEN SATURATION: 94 %

## 2017-11-17 DIAGNOSIS — L73.9 FOLLICULITIS: ICD-10-CM

## 2017-11-17 DIAGNOSIS — L82.1 SEBORRHEIC KERATOSES: ICD-10-CM

## 2017-11-17 DIAGNOSIS — L91.0 KELOID: Primary | ICD-10-CM

## 2017-11-17 DIAGNOSIS — D22.9 NEVUS: ICD-10-CM

## 2017-11-17 PROCEDURE — G8417 CALC BMI ABV UP PARAM F/U: HCPCS | Performed by: DERMATOLOGY

## 2017-11-17 PROCEDURE — 99202 OFFICE O/P NEW SF 15 MIN: CPT | Performed by: DERMATOLOGY

## 2017-11-17 PROCEDURE — G8484 FLU IMMUNIZE NO ADMIN: HCPCS | Performed by: DERMATOLOGY

## 2017-11-17 PROCEDURE — G8427 DOCREV CUR MEDS BY ELIG CLIN: HCPCS | Performed by: DERMATOLOGY

## 2017-11-17 PROCEDURE — G8598 ASA/ANTIPLAT THER USED: HCPCS | Performed by: DERMATOLOGY

## 2017-11-17 PROCEDURE — 4004F PT TOBACCO SCREEN RCVD TLK: CPT | Performed by: DERMATOLOGY

## 2017-11-17 PROCEDURE — 11900 INJECT SKIN LESIONS </W 7: CPT | Performed by: DERMATOLOGY

## 2017-11-17 RX ORDER — TRIAMCINOLONE ACETONIDE 40 MG/ML
40 INJECTION, SUSPENSION INTRA-ARTICULAR; INTRAMUSCULAR ONCE
Status: COMPLETED | OUTPATIENT
Start: 2017-11-17 | End: 2017-11-17

## 2017-11-17 RX ADMIN — TRIAMCINOLONE ACETONIDE 40 MG: 40 INJECTION, SUSPENSION INTRA-ARTICULAR; INTRAMUSCULAR at 11:09

## 2017-11-17 NOTE — LETTER
[facility]) Dermatology   [address] [place] [address],[address]  [facility] [address]  Phone: [phone]  Fax: [phone]     Jade Santillan MD       November 17, 2017     Lowell Felix, [address] [place] [address]     Patient: Joseph Dale   MR Number: [id number]   YOB: 1970   Date of Visit: 11/17/2017     Dear Dr. Montes De Oca : Thank you for the request for consultation for Mr. Gail Bolden to me for evaluation. Below are the relevant portions of my assessment and plan of care. 1. Keloid  ILK performed  Intralesional Injection: After cleansing with alcohol the 2 hypertrophic scars on the right arm were injected with 0.3 ml of Kenalog 40    2. Nevus  Clinically and dermatoscopically benign on exam today  Discussed sunscreen and sun protection - recommend SPF 30 or greater sunscreen applied every 2-3 hours, sun protective clothing and avoidance of peak sun. 3. Seborrheic keratoses  Discussed the benign etiology of this lesion    4. Folliculitis   Discussed treatment if desired - declined today       Patient Instructions   1. Follow up in 6 weeks. If you have questions, please do not hesitate to call me. I look forward to following Ernie Lee along with you.     Sincerely,        Jade Santillan MD

## 2017-11-17 NOTE — PROGRESS NOTES
Dermatology Patient Note  [address]  [address] [facility] [address] [address]  Dept: [phone]  Dept Fax: [phone]      VISIT DATE: 11/17/2017   REFERRING PROVIDER: Jamil Chavez CNP      Howard Hancock is a 41 y.o. male  who presents today in the office for:    New Patient (skin lesions on right arm for years; mole on scalp that bothers him)      HISTORY OF PRESENT ILLNESS:  Howard Hancock is a 41 y.o. male who presents for several concerns. 1. He has 2 lesions on his right arm for years that are slowly enlarging, painful when hit and persistent. He believes they started as ingrown hairs. 2. He reports a mole he has had his entire life on the right side of his scalp    3. He reports a lesion of more recent on set that is brown and scales on the left arm    CURRENT MEDICATIONS:   Current Outpatient Prescriptions   Medication Sig Dispense Refill    clopidogrel (PLAVIX) 75 MG tablet Take 1 tablet by mouth daily 90 tablet 3    losartan-hydrochlorothiazide (HYZAAR) 100-25 MG per tablet Take 1 tablet by mouth daily 90 tablet 3    nebivolol (BYSTOLIC) 10 MG tablet Take 1 tablet by mouth daily 90 tablet 3    sertraline (ZOLOFT) 25 MG tablet Take 1 tablet by mouth daily 30 tablet 3    Multiple Vitamins-Minerals (CENTRUM ADULTS PO) Take 1 tablet by mouth daily      aspirin EC 81 MG EC tablet Take 1 tablet by mouth daily 30 tablet 3    albuterol (PROVENTIL) (2.5 MG/3ML) 0.083% nebulizer solution Take 3 mLs by nebulization every 4 hours as needed for Wheezing (dispense nebules) 50 vial 3    hydrochlorothiazide (HYDRODIURIL) 25 MG tablet Take 1 tablet by mouth daily PRN swelling 30 tablet 3    nitroGLYCERIN (NITROSTAT) 0.4 MG SL tablet Place 1 tablet under the tongue every 5 minutes as needed for Chest pain. 25 tablet 3    albuterol (PROVENTIL HFA) 108 (90 BASE) MCG/ACT inhaler Inhale 2 puffs into the lungs every 4 hours as needed for Wheezing. Generic albuterol is ok.

## 2018-01-02 ENCOUNTER — OFFICE VISIT (OUTPATIENT)
Dept: DERMATOLOGY | Age: 48
End: 2018-01-02
Payer: COMMERCIAL

## 2018-01-02 VITALS
SYSTOLIC BLOOD PRESSURE: 156 MMHG | HEIGHT: 72 IN | WEIGHT: 248 LBS | BODY MASS INDEX: 33.59 KG/M2 | HEART RATE: 82 BPM | DIASTOLIC BLOOD PRESSURE: 88 MMHG

## 2018-01-02 DIAGNOSIS — L73.9 FOLLICULITIS: ICD-10-CM

## 2018-01-02 DIAGNOSIS — L91.0 KELOID: Primary | ICD-10-CM

## 2018-01-02 PROCEDURE — 99213 OFFICE O/P EST LOW 20 MIN: CPT | Performed by: DERMATOLOGY

## 2018-01-02 PROCEDURE — 11900 INJECT SKIN LESIONS </W 7: CPT | Performed by: DERMATOLOGY

## 2018-01-02 RX ORDER — TRIAMCINOLONE ACETONIDE 40 MG/ML
20 INJECTION, SUSPENSION INTRA-ARTICULAR; INTRAMUSCULAR ONCE
Status: COMPLETED | OUTPATIENT
Start: 2018-01-02 | End: 2018-01-02

## 2018-01-02 RX ORDER — CLINDAMYCIN PHOSPHATE 10 UG/ML
LOTION TOPICAL
Qty: 240 ML | Refills: 4 | Status: SHIPPED | OUTPATIENT
Start: 2018-01-02 | End: 2020-05-06

## 2018-01-02 RX ADMIN — TRIAMCINOLONE ACETONIDE 20 MG: 40 INJECTION, SUSPENSION INTRA-ARTICULAR; INTRAMUSCULAR at 11:57

## 2018-01-02 NOTE — PROGRESS NOTES
Dermatology Patient Note  [address]  [address] [place] [address] [address] [address]  Dept: [phone]  Dept Fax: [phone]      VISIT DATE: 1/2/2018   REFERRING PROVIDER: No ref. provider found      Haylee Bazzi is a 41 y.o. male  who presents today in the office for:    Keloid Scar (6 week follow up (change in size it is smaller ))      HISTORY OF PRESENT ILLNESS:  Follows up for Keloids and folliculitis. His keloids are flattening out after 1 injection, but he would like reinjection today. At last visit he was noted to have folliculitis which was not bothersome, but he has started to develop deeper more painful folliculitis on the right chest and would like treatment today    CURRENT MEDICATIONS:   Current Outpatient Prescriptions   Medication Sig Dispense Refill    benzoyl peroxide 5 % external liquid Wash face, chest and back 1-2 times daily 681 g 4    clindamycin (CLEOCIN T) 1 % lotion Apply to face, chest and back daily 240 mL 4    clopidogrel (PLAVIX) 75 MG tablet Take 1 tablet by mouth daily 90 tablet 3    losartan-hydrochlorothiazide (HYZAAR) 100-25 MG per tablet Take 1 tablet by mouth daily 90 tablet 3    nebivolol (BYSTOLIC) 10 MG tablet Take 1 tablet by mouth daily 90 tablet 3    sertraline (ZOLOFT) 25 MG tablet Take 1 tablet by mouth daily 30 tablet 3    Multiple Vitamins-Minerals (CENTRUM ADULTS PO) Take 1 tablet by mouth daily      albuterol (PROVENTIL) (2.5 MG/3ML) 0.083% nebulizer solution Take 3 mLs by nebulization every 4 hours as needed for Wheezing (dispense nebules) 50 vial 3    hydrochlorothiazide (HYDRODIURIL) 25 MG tablet Take 1 tablet by mouth daily PRN swelling 30 tablet 3    nitroGLYCERIN (NITROSTAT) 0.4 MG SL tablet Place 1 tablet under the tongue every 5 minutes as needed for Chest pain. 25 tablet 3    albuterol (PROVENTIL HFA) 108 (90 BASE) MCG/ACT inhaler Inhale 2 puffs into the lungs every 4 hours as needed for Wheezing.  Generic

## 2018-03-06 ENCOUNTER — OFFICE VISIT (OUTPATIENT)
Dept: DERMATOLOGY | Age: 48
End: 2018-03-06
Payer: COMMERCIAL

## 2018-03-06 VITALS
DIASTOLIC BLOOD PRESSURE: 64 MMHG | HEIGHT: 72 IN | HEART RATE: 95 BPM | BODY MASS INDEX: 33.59 KG/M2 | WEIGHT: 248 LBS | OXYGEN SATURATION: 94 % | SYSTOLIC BLOOD PRESSURE: 94 MMHG

## 2018-03-06 DIAGNOSIS — L91.0 KELOID: Primary | ICD-10-CM

## 2018-03-06 DIAGNOSIS — L73.9 FOLLICULITIS: ICD-10-CM

## 2018-03-06 PROCEDURE — G8417 CALC BMI ABV UP PARAM F/U: HCPCS | Performed by: DERMATOLOGY

## 2018-03-06 PROCEDURE — G8598 ASA/ANTIPLAT THER USED: HCPCS | Performed by: DERMATOLOGY

## 2018-03-06 PROCEDURE — G8427 DOCREV CUR MEDS BY ELIG CLIN: HCPCS | Performed by: DERMATOLOGY

## 2018-03-06 PROCEDURE — 99213 OFFICE O/P EST LOW 20 MIN: CPT | Performed by: DERMATOLOGY

## 2018-03-06 PROCEDURE — G8484 FLU IMMUNIZE NO ADMIN: HCPCS | Performed by: DERMATOLOGY

## 2018-03-06 PROCEDURE — 4004F PT TOBACCO SCREEN RCVD TLK: CPT | Performed by: DERMATOLOGY

## 2018-03-09 DIAGNOSIS — F41.9 ANXIETY: ICD-10-CM

## 2018-03-09 RX ORDER — SERTRALINE HYDROCHLORIDE 25 MG/1
25 TABLET, FILM COATED ORAL DAILY
Qty: 90 TABLET | Refills: 1 | Status: SHIPPED | OUTPATIENT
Start: 2018-03-09 | End: 2018-09-28 | Stop reason: SDUPTHER

## 2018-04-17 ENCOUNTER — OFFICE VISIT (OUTPATIENT)
Dept: DERMATOLOGY | Age: 48
End: 2018-04-17
Payer: COMMERCIAL

## 2018-04-17 ENCOUNTER — HOSPITAL ENCOUNTER (OUTPATIENT)
Age: 48
Setting detail: SPECIMEN
Discharge: HOME | End: 2018-04-17
Payer: COMMERCIAL

## 2018-04-17 VITALS — SYSTOLIC BLOOD PRESSURE: 156 MMHG | DIASTOLIC BLOOD PRESSURE: 96 MMHG

## 2018-04-17 DIAGNOSIS — L91.0 KELOID: Primary | ICD-10-CM

## 2018-04-17 PROCEDURE — 12032 INTMD RPR S/A/T/EXT 2.6-7.5: CPT | Performed by: DERMATOLOGY

## 2018-04-17 PROCEDURE — 11403 EXC TR-EXT B9+MARG 2.1-3CM: CPT | Performed by: DERMATOLOGY

## 2018-04-17 RX ORDER — LIDOCAINE HYDROCHLORIDE AND EPINEPHRINE 10; 10 MG/ML; UG/ML
0.75 INJECTION, SOLUTION INFILTRATION; PERINEURAL ONCE
Status: COMPLETED | OUTPATIENT
Start: 2018-04-17 | End: 2018-04-17

## 2018-04-17 RX ADMIN — LIDOCAINE HYDROCHLORIDE AND EPINEPHRINE 0.75 ML: 10; 10 INJECTION, SOLUTION INFILTRATION; PERINEURAL at 12:03

## 2018-04-20 ENCOUNTER — TELEPHONE (OUTPATIENT)
Dept: DERMATOLOGY | Age: 48
End: 2018-04-20

## 2018-04-20 LAB — DERMATOLOGY PATHOLOGY REPORT: NORMAL

## 2018-05-01 ENCOUNTER — OFFICE VISIT (OUTPATIENT)
Dept: DERMATOLOGY | Age: 48
End: 2018-05-01

## 2018-05-01 DIAGNOSIS — Z48.02 VISIT FOR SUTURE REMOVAL: Primary | ICD-10-CM

## 2018-05-01 PROCEDURE — 99024 POSTOP FOLLOW-UP VISIT: CPT | Performed by: DERMATOLOGY

## 2018-05-23 ENCOUNTER — TELEPHONE (OUTPATIENT)
Dept: FAMILY MEDICINE CLINIC | Age: 48
End: 2018-05-23

## 2018-05-23 DIAGNOSIS — M51.26 LUMBAR HERNIATED DISC: ICD-10-CM

## 2018-05-23 DIAGNOSIS — M54.42 ACUTE LEFT-SIDED LOW BACK PAIN WITH LEFT-SIDED SCIATICA: ICD-10-CM

## 2018-05-23 RX ORDER — METHYLPREDNISOLONE 4 MG/1
TABLET ORAL
Qty: 1 KIT | Refills: 0 | Status: SHIPPED | OUTPATIENT
Start: 2018-05-23 | End: 2018-05-29

## 2018-05-23 RX ORDER — TIZANIDINE 4 MG/1
4 TABLET ORAL 3 TIMES DAILY
Qty: 30 TABLET | Refills: 5 | Status: SHIPPED | OUTPATIENT
Start: 2018-05-23 | End: 2019-05-23

## 2018-06-19 ENCOUNTER — TELEPHONE (OUTPATIENT)
Dept: FAMILY MEDICINE CLINIC | Age: 48
End: 2018-06-19

## 2018-06-19 RX ORDER — ATENOLOL 50 MG/1
50 TABLET ORAL DAILY
Qty: 90 TABLET | Refills: 3 | Status: CANCELLED | OUTPATIENT
Start: 2018-06-19 | End: 2019-06-19

## 2018-06-19 NOTE — TELEPHONE ENCOUNTER
Sharon DILLARD - CNP: please see below, ok for formulary conversion from Bystolic to atenolol? Order pended for change from Bystolic 10 mg daily to atenolol 50 mg daily for your convenience if you agree. Or please advise if you would like cardiology to review instead (pt asked for Rx from you if OK with you)    Thank you! Kayleigh Crain, PharmD, [facility] Pharmacist  Direct: [phone]  Dept: [phone] (toll free [phone], option 7)   =======================================================  CLINICAL PHARMACY CONSULT: MEDICATION CONVERSION INITIATIVE    Sherice Fink is a 57 y.o. male referred to clinical pharmacy specialist -  identified with current prescription for Bystolic (nebivolol). Starting 7/1/2018, the prescribed medication is going to be excluded from patient's pharmacy benefit. For ministry and patient cost savings, a conversion has been identified to alternative beta blocker. Last prescriber: Zenaida DILLARD (cardiology - see Epic)  Directions: Bystolic 10 mg tab - 1 tab daily  Medication history: does not appear has tried alternative beta blocker. Is also on losartan-HCTZ 100-25 mg daily  h/o HTN, CAD, tachycardia    Allergies   Allergen Reactions    Chantix [Varenicline] Other (See Comments)     Chest pain    Lipitor [Atorvastatin] Other (See Comments)     myalgias    Livalo [Pitavastatin] Other (See Comments)     Myalgias         BP Readings from Last 3 Encounters:   04/17/18 (!) 156/96   03/06/18 94/64   01/02/18 (!) 156/88      Pulse Readings from Last 3 Encounters:   03/06/18 95   01/02/18 82   11/17/17 73     CrCl cannot be calculated (Patient's most recent lab result is older than the maximum 90 days allowed. ).    GFR: > 60 mL/min/m2    PLAN:  - Suggest discontinue Bystolic (nebivolol) 10 mg daily, change to atenolol 50 mg daily & titrate as needed (tier 1 medication)  - Alternatively, metoprolol 100 mg daily or bisoprolol 10 mg daily other

## 2018-06-26 NOTE — TELEPHONE ENCOUNTER
I have called the patient and received his voicemail. Message was left. I asked him to call our office to schedule an appt so I can speak with him regarding his medications. I am awaiting his call back. Thank you.

## 2018-09-28 DIAGNOSIS — F41.9 ANXIETY: ICD-10-CM

## 2018-09-28 RX ORDER — SERTRALINE HYDROCHLORIDE 25 MG/1
25 TABLET, FILM COATED ORAL DAILY
Qty: 90 TABLET | Refills: 1 | Status: SHIPPED | OUTPATIENT
Start: 2018-09-28 | End: 2019-02-07 | Stop reason: SDUPTHER

## 2018-09-28 NOTE — TELEPHONE ENCOUNTER
LOV was 10-23-17, LR was 3-9-18. cm      Next Visit Date:  No future appointments.     Health Maintenance   Topic Date Due    HIV screen  01/30/1987    A1C test (Diabetic or Prediabetic)  11/24/2016    Potassium monitoring  07/01/2018    Creatinine monitoring  07/01/2018    Flu vaccine (1) 09/01/2018    Lipid screen  07/01/2022    DTaP/Tdap/Td vaccine (2 - Td) 10/23/2027    Pneumococcal med risk  Completed       Hemoglobin A1C (%)   Date Value   11/24/2015 6.3 (H)             ( goal A1C is < 7)   No results found for: LABMICR  LDL Cholesterol (mg/dL)   Date Value   07/01/2017 61   11/24/2015 63       (goal LDL is <100)   AST (U/L)   Date Value   07/01/2017 21     ALT (U/L)   Date Value   07/01/2017 28     BUN (mg/dL)   Date Value   07/01/2017 9     BP Readings from Last 3 Encounters:   04/17/18 (!) 156/96   03/06/18 94/64   01/02/18 (!) 156/88          (goal 120/80)    All Future Testing planned in [facility]  Lab Frequency Next Occurrence   HIV Screen Once 10/23/2017               Patient Active Problem List:     Hypertriglyceridemia     Hypertension     GERD (gastroesophageal reflux disease)     CAD (coronary artery disease)     Presence of drug coated stent in right coronary artery     S/P cardiac catheterization  3/27/2015 [facility]     Lumbar disc herniation with radiculopathy     Impaired fasting blood sugar     Unstable angina (HCC)     COPD (chronic obstructive pulmonary disease) (HCC)     Precordial chest pain     Smoker     Acute bronchitis     Anxiety

## 2019-02-06 DIAGNOSIS — R06.2 WHEEZE: ICD-10-CM

## 2019-02-06 DIAGNOSIS — J44.9 CHRONIC OBSTRUCTIVE PULMONARY DISEASE, UNSPECIFIED COPD TYPE (HCC): Primary | ICD-10-CM

## 2019-02-06 RX ORDER — ALBUTEROL SULFATE 90 UG/1
2 AEROSOL, METERED RESPIRATORY (INHALATION) EVERY 4 HOURS PRN
Qty: 1 INHALER | Refills: 3 | Status: SHIPPED | OUTPATIENT
Start: 2019-02-06 | End: 2020-03-15 | Stop reason: SDUPTHER

## 2019-02-06 RX ORDER — ALBUTEROL SULFATE 2.5 MG/3ML
2.5 SOLUTION RESPIRATORY (INHALATION) EVERY 4 HOURS PRN
Qty: 50 VIAL | Refills: 5 | Status: SHIPPED | OUTPATIENT
Start: 2019-02-06 | End: 2020-03-15 | Stop reason: SDUPTHER

## 2019-02-07 DIAGNOSIS — F41.9 ANXIETY: ICD-10-CM

## 2019-02-07 RX ORDER — SERTRALINE HYDROCHLORIDE 25 MG/1
25 TABLET, FILM COATED ORAL DAILY
Qty: 90 TABLET | Refills: 1 | Status: SHIPPED | OUTPATIENT
Start: 2019-02-07 | End: 2019-10-21 | Stop reason: SDUPTHER

## 2019-08-16 ENCOUNTER — HOSPITAL ENCOUNTER (INPATIENT)
Age: 49
LOS: 2 days | Discharge: HOME | DRG: 440 | End: 2019-08-18
Attending: EMERGENCY MEDICINE | Admitting: INTERNAL MEDICINE
Payer: COMMERCIAL

## 2019-08-16 ENCOUNTER — APPOINTMENT (OUTPATIENT)
Dept: CT IMAGING | Age: 49
DRG: 440 | End: 2019-08-16
Payer: COMMERCIAL

## 2019-08-16 DIAGNOSIS — K85.00 IDIOPATHIC ACUTE PANCREATITIS WITHOUT INFECTION OR NECROSIS: Primary | ICD-10-CM

## 2019-08-16 PROBLEM — K85.90 ACUTE PANCREATITIS: Status: ACTIVE | Noted: 2019-08-16

## 2019-08-16 LAB
ABSOLUTE EOS #: 0.07 K/UL (ref 0–0.44)
ABSOLUTE IMMATURE GRANULOCYTE: <0.03 K/UL (ref 0–0.3)
ABSOLUTE LYMPH #: 1.93 K/UL (ref 1.1–3.7)
ABSOLUTE MONO #: 0.43 K/UL (ref 0.1–1.2)
ALBUMIN SERPL-MCNC: 4 G/DL (ref 3.5–5.2)
ALBUMIN/GLOBULIN RATIO: 1.2 (ref 1–2.5)
ALP BLD-CCNC: 53 U/L (ref 40–129)
ALT SERPL-CCNC: 67 U/L (ref 5–41)
ANION GAP SERPL CALCULATED.3IONS-SCNC: 9 MMOL/L (ref 9–17)
AST SERPL-CCNC: 55 U/L
BASOPHILS # BLD: 0 % (ref 0–2)
BASOPHILS ABSOLUTE: 0.03 K/UL (ref 0–0.2)
BILIRUB SERPL-MCNC: 0.35 MG/DL (ref 0.3–1.2)
BILIRUBIN DIRECT: <0.08 MG/DL
BILIRUBIN, INDIRECT: ABNORMAL MG/DL (ref 0–1)
BUN BLDV-MCNC: 12 MG/DL (ref 6–20)
BUN/CREAT BLD: 17 (ref 9–20)
CALCIUM SERPL-MCNC: 9.7 MG/DL (ref 8.6–10.4)
CHLORIDE BLD-SCNC: 95 MMOL/L (ref 98–107)
CO2: 29 MMOL/L (ref 20–31)
CREAT SERPL-MCNC: 0.71 MG/DL (ref 0.7–1.2)
DIFFERENTIAL TYPE: ABNORMAL
EOSINOPHILS RELATIVE PERCENT: 1 % (ref 1–4)
GFR AFRICAN AMERICAN: >60 ML/MIN
GFR NON-AFRICAN AMERICAN: >60 ML/MIN
GFR SERPL CREATININE-BSD FRML MDRD: ABNORMAL ML/MIN/{1.73_M2}
GFR SERPL CREATININE-BSD FRML MDRD: ABNORMAL ML/MIN/{1.73_M2}
GLOBULIN: ABNORMAL G/DL (ref 1.5–3.8)
GLUCOSE BLD-MCNC: 203 MG/DL (ref 70–99)
HCT VFR BLD CALC: 42.1 % (ref 40.7–50.3)
HEMOGLOBIN: 14.3 G/DL (ref 13–17)
IMMATURE GRANULOCYTES: 0 %
LACTIC ACID, WHOLE BLOOD: NORMAL MMOL/L (ref 0.7–2.1)
LACTIC ACID: 1.2 MMOL/L (ref 0.5–2.2)
LIPASE: 97 U/L (ref 13–60)
LYMPHOCYTES # BLD: 27 % (ref 24–43)
MCH RBC QN AUTO: 32.8 PG (ref 25.2–33.5)
MCHC RBC AUTO-ENTMCNC: 34 G/DL (ref 28.4–34.8)
MCV RBC AUTO: 96.6 FL (ref 82.6–102.9)
MONOCYTES # BLD: 6 % (ref 3–12)
NRBC AUTOMATED: 0 PER 100 WBC
PDW BLD-RTO: 12.1 % (ref 11.8–14.4)
PLATELET # BLD: 141 K/UL (ref 138–453)
PLATELET ESTIMATE: ABNORMAL
PMV BLD AUTO: 10.9 FL (ref 8.1–13.5)
POTASSIUM SERPL-SCNC: 3.8 MMOL/L (ref 3.7–5.3)
RBC # BLD: 4.36 M/UL (ref 4.21–5.77)
RBC # BLD: ABNORMAL 10*6/UL
SEG NEUTROPHILS: 66 % (ref 36–65)
SEGMENTED NEUTROPHILS ABSOLUTE COUNT: 4.56 K/UL (ref 1.5–8.1)
SODIUM BLD-SCNC: 133 MMOL/L (ref 135–144)
TOTAL PROTEIN: 7.3 G/DL (ref 6.4–8.3)
WBC # BLD: 7 K/UL (ref 3.5–11.3)
WBC # BLD: ABNORMAL 10*3/UL

## 2019-08-16 PROCEDURE — 80048 BASIC METABOLIC PNL TOTAL CA: CPT

## 2019-08-16 PROCEDURE — 96375 TX/PRO/DX INJ NEW DRUG ADDON: CPT

## 2019-08-16 PROCEDURE — 6360000004 HC RX CONTRAST MEDICATION: Performed by: EMERGENCY MEDICINE

## 2019-08-16 PROCEDURE — 80076 HEPATIC FUNCTION PANEL: CPT

## 2019-08-16 PROCEDURE — 96374 THER/PROPH/DIAG INJ IV PUSH: CPT

## 2019-08-16 PROCEDURE — 36415 COLL VENOUS BLD VENIPUNCTURE: CPT

## 2019-08-16 PROCEDURE — 83690 ASSAY OF LIPASE: CPT

## 2019-08-16 PROCEDURE — 2580000003 HC RX 258: Performed by: INTERNAL MEDICINE

## 2019-08-16 PROCEDURE — 1200000000 HC SEMI PRIVATE

## 2019-08-16 PROCEDURE — 2580000003 HC RX 258: Performed by: EMERGENCY MEDICINE

## 2019-08-16 PROCEDURE — 83605 ASSAY OF LACTIC ACID: CPT

## 2019-08-16 PROCEDURE — 85025 COMPLETE CBC W/AUTO DIFF WBC: CPT

## 2019-08-16 PROCEDURE — 99285 EMERGENCY DEPT VISIT HI MDM: CPT

## 2019-08-16 PROCEDURE — 96376 TX/PRO/DX INJ SAME DRUG ADON: CPT

## 2019-08-16 PROCEDURE — 74177 CT ABD & PELVIS W/CONTRAST: CPT

## 2019-08-16 PROCEDURE — 6360000002 HC RX W HCPCS: Performed by: EMERGENCY MEDICINE

## 2019-08-16 RX ORDER — SODIUM CHLORIDE 9 MG/ML
INJECTION, SOLUTION INTRAVENOUS CONTINUOUS
Status: DISCONTINUED | OUTPATIENT
Start: 2019-08-16 | End: 2019-08-18

## 2019-08-16 RX ORDER — MORPHINE SULFATE 4 MG/ML
4 INJECTION, SOLUTION INTRAMUSCULAR; INTRAVENOUS ONCE
Status: COMPLETED | OUTPATIENT
Start: 2019-08-16 | End: 2019-08-16

## 2019-08-16 RX ORDER — ONDANSETRON 2 MG/ML
4 INJECTION INTRAMUSCULAR; INTRAVENOUS ONCE
Status: COMPLETED | OUTPATIENT
Start: 2019-08-16 | End: 2019-08-16

## 2019-08-16 RX ORDER — ALBUTEROL SULFATE 2.5 MG/3ML
2.5 SOLUTION RESPIRATORY (INHALATION) EVERY 4 HOURS PRN
Status: DISCONTINUED | OUTPATIENT
Start: 2019-08-16 | End: 2019-08-18 | Stop reason: HOSPADM

## 2019-08-16 RX ORDER — MORPHINE SULFATE 2 MG/ML
1 INJECTION, SOLUTION INTRAMUSCULAR; INTRAVENOUS EVERY 4 HOURS PRN
Status: DISCONTINUED | OUTPATIENT
Start: 2019-08-16 | End: 2019-08-18 | Stop reason: HOSPADM

## 2019-08-16 RX ORDER — SODIUM CHLORIDE 0.9 % (FLUSH) 0.9 %
10 SYRINGE (ML) INJECTION PRN
Status: DISCONTINUED | OUTPATIENT
Start: 2019-08-16 | End: 2019-08-18 | Stop reason: HOSPADM

## 2019-08-16 RX ORDER — ONDANSETRON 2 MG/ML
4 INJECTION INTRAMUSCULAR; INTRAVENOUS EVERY 6 HOURS PRN
Status: DISCONTINUED | OUTPATIENT
Start: 2019-08-16 | End: 2019-08-18 | Stop reason: HOSPADM

## 2019-08-16 RX ORDER — 0.9 % SODIUM CHLORIDE 0.9 %
10 VIAL (ML) INJECTION DAILY
Status: DISCONTINUED | OUTPATIENT
Start: 2019-08-17 | End: 2019-08-17

## 2019-08-16 RX ORDER — PANTOPRAZOLE SODIUM 40 MG/10ML
40 INJECTION, POWDER, LYOPHILIZED, FOR SOLUTION INTRAVENOUS DAILY
Status: DISCONTINUED | OUTPATIENT
Start: 2019-08-17 | End: 2019-08-17

## 2019-08-16 RX ORDER — SODIUM CHLORIDE 0.9 % (FLUSH) 0.9 %
10 SYRINGE (ML) INJECTION EVERY 12 HOURS SCHEDULED
Status: DISCONTINUED | OUTPATIENT
Start: 2019-08-16 | End: 2019-08-18 | Stop reason: HOSPADM

## 2019-08-16 RX ORDER — 0.9 % SODIUM CHLORIDE 0.9 %
1000 INTRAVENOUS SOLUTION INTRAVENOUS ONCE
Status: COMPLETED | OUTPATIENT
Start: 2019-08-16 | End: 2019-08-16

## 2019-08-16 RX ADMIN — ONDANSETRON 4 MG: 2 INJECTION INTRAMUSCULAR; INTRAVENOUS at 20:33

## 2019-08-16 RX ADMIN — Medication 10 ML: at 23:32

## 2019-08-16 RX ADMIN — SODIUM CHLORIDE 1000 ML: 9 INJECTION, SOLUTION INTRAVENOUS at 20:40

## 2019-08-16 RX ADMIN — SODIUM CHLORIDE: 9 INJECTION, SOLUTION INTRAVENOUS at 23:31

## 2019-08-16 RX ADMIN — MORPHINE SULFATE 4 MG: 4 INJECTION, SOLUTION INTRAMUSCULAR; INTRAVENOUS at 20:33

## 2019-08-16 RX ADMIN — IOPAMIDOL 75 ML: 755 INJECTION, SOLUTION INTRAVENOUS at 21:13

## 2019-08-16 RX ADMIN — MORPHINE SULFATE 4 MG: 4 INJECTION, SOLUTION INTRAMUSCULAR; INTRAVENOUS at 22:43

## 2019-08-16 ASSESSMENT — ENCOUNTER SYMPTOMS
ABDOMINAL PAIN: 1
NAUSEA: 0
COLOR CHANGE: 0
VOMITING: 0
DIARRHEA: 0
COUGH: 0
CONSTIPATION: 1
BACK PAIN: 0
SORE THROAT: 0

## 2019-08-16 ASSESSMENT — PAIN DESCRIPTION - PAIN TYPE
TYPE: ACUTE PAIN
TYPE: ACUTE PAIN

## 2019-08-16 ASSESSMENT — PAIN SCALES - GENERAL
PAINLEVEL_OUTOF10: 2
PAINLEVEL_OUTOF10: 2
PAINLEVEL_OUTOF10: 6
PAINLEVEL_OUTOF10: 7
PAINLEVEL_OUTOF10: 6

## 2019-08-16 ASSESSMENT — PAIN DESCRIPTION - LOCATION
LOCATION: ABDOMEN
LOCATION: ABDOMEN

## 2019-08-17 ENCOUNTER — APPOINTMENT (OUTPATIENT)
Dept: ULTRASOUND IMAGING | Age: 49
DRG: 440 | End: 2019-08-17
Payer: COMMERCIAL

## 2019-08-17 LAB
ALBUMIN SERPL-MCNC: 3.5 G/DL (ref 3.5–5.2)
ALBUMIN/GLOBULIN RATIO: 1.2 (ref 1–2.5)
ALP BLD-CCNC: 45 U/L (ref 40–129)
ALT SERPL-CCNC: 58 U/L (ref 5–41)
ANION GAP SERPL CALCULATED.3IONS-SCNC: 11 MMOL/L (ref 9–17)
AST SERPL-CCNC: 43 U/L
BILIRUB SERPL-MCNC: 0.37 MG/DL (ref 0.3–1.2)
BUN BLDV-MCNC: 10 MG/DL (ref 6–20)
BUN/CREAT BLD: 15 (ref 9–20)
CALCIUM SERPL-MCNC: 9 MG/DL (ref 8.6–10.4)
CHLORIDE BLD-SCNC: 100 MMOL/L (ref 98–107)
CO2: 26 MMOL/L (ref 20–31)
CREAT SERPL-MCNC: 0.67 MG/DL (ref 0.7–1.2)
GFR AFRICAN AMERICAN: >60 ML/MIN
GFR NON-AFRICAN AMERICAN: >60 ML/MIN
GFR SERPL CREATININE-BSD FRML MDRD: ABNORMAL ML/MIN/{1.73_M2}
GFR SERPL CREATININE-BSD FRML MDRD: ABNORMAL ML/MIN/{1.73_M2}
GLUCOSE BLD-MCNC: 122 MG/DL (ref 70–99)
HCT VFR BLD CALC: 39.9 % (ref 40.7–50.3)
HEMOGLOBIN: 13.5 G/DL (ref 13–17)
MCH RBC QN AUTO: 33 PG (ref 25.2–33.5)
MCHC RBC AUTO-ENTMCNC: 33.8 G/DL (ref 28.4–34.8)
MCV RBC AUTO: 97.6 FL (ref 82.6–102.9)
NRBC AUTOMATED: 0 PER 100 WBC
PDW BLD-RTO: 12.1 % (ref 11.8–14.4)
PLATELET # BLD: ABNORMAL K/UL (ref 138–453)
PLATELET, FLUORESCENCE: 134 K/UL (ref 138–453)
PLATELET, IMMATURE FRACTION: 7.6 % (ref 1.1–10.3)
PMV BLD AUTO: ABNORMAL FL (ref 8.1–13.5)
POTASSIUM SERPL-SCNC: 3.8 MMOL/L (ref 3.7–5.3)
RBC # BLD: 4.09 M/UL (ref 4.21–5.77)
SODIUM BLD-SCNC: 137 MMOL/L (ref 135–144)
TOTAL PROTEIN: 6.4 G/DL (ref 6.4–8.3)
WBC # BLD: 7.1 K/UL (ref 3.5–11.3)

## 2019-08-17 PROCEDURE — 76705 ECHO EXAM OF ABDOMEN: CPT

## 2019-08-17 PROCEDURE — 80053 COMPREHEN METABOLIC PANEL: CPT

## 2019-08-17 PROCEDURE — 1200000000 HC SEMI PRIVATE

## 2019-08-17 PROCEDURE — 6360000002 HC RX W HCPCS: Performed by: INTERNAL MEDICINE

## 2019-08-17 PROCEDURE — 85027 COMPLETE CBC AUTOMATED: CPT

## 2019-08-17 PROCEDURE — 2580000003 HC RX 258: Performed by: INTERNAL MEDICINE

## 2019-08-17 PROCEDURE — 36415 COLL VENOUS BLD VENIPUNCTURE: CPT

## 2019-08-17 PROCEDURE — C9113 INJ PANTOPRAZOLE SODIUM, VIA: HCPCS | Performed by: INTERNAL MEDICINE

## 2019-08-17 PROCEDURE — 94760 N-INVAS EAR/PLS OXIMETRY 1: CPT

## 2019-08-17 PROCEDURE — 6370000000 HC RX 637 (ALT 250 FOR IP): Performed by: INTERNAL MEDICINE

## 2019-08-17 PROCEDURE — 83036 HEMOGLOBIN GLYCOSYLATED A1C: CPT

## 2019-08-17 RX ORDER — LOSARTAN POTASSIUM 50 MG/1
100 TABLET ORAL DAILY
Status: DISCONTINUED | OUTPATIENT
Start: 2019-08-17 | End: 2019-08-18 | Stop reason: HOSPADM

## 2019-08-17 RX ORDER — ESOMEPRAZOLE MAGNESIUM 40 MG/1
40 FOR SUSPENSION ORAL DAILY
Status: DISCONTINUED | OUTPATIENT
Start: 2019-08-17 | End: 2019-08-17

## 2019-08-17 RX ORDER — M-VIT,TX,IRON,MINS/CALC/FOLIC 27MG-0.4MG
1 TABLET ORAL DAILY
Status: DISCONTINUED | OUTPATIENT
Start: 2019-08-17 | End: 2019-08-18 | Stop reason: HOSPADM

## 2019-08-17 RX ORDER — SERTRALINE HYDROCHLORIDE 25 MG/1
25 TABLET, FILM COATED ORAL DAILY
Status: DISCONTINUED | OUTPATIENT
Start: 2019-08-17 | End: 2019-08-18 | Stop reason: HOSPADM

## 2019-08-17 RX ORDER — ESOMEPRAZOLE MAGNESIUM 40 MG/1
40 FOR SUSPENSION ORAL DAILY
Status: DISCONTINUED | OUTPATIENT
Start: 2019-08-18 | End: 2019-08-18 | Stop reason: HOSPADM

## 2019-08-17 RX ORDER — CLOPIDOGREL BISULFATE 75 MG/1
75 TABLET ORAL DAILY
Status: DISCONTINUED | OUTPATIENT
Start: 2019-08-17 | End: 2019-08-18 | Stop reason: HOSPADM

## 2019-08-17 RX ORDER — ASPIRIN 81 MG/1
81 TABLET ORAL DAILY
Status: DISCONTINUED | OUTPATIENT
Start: 2019-08-17 | End: 2019-08-18 | Stop reason: HOSPADM

## 2019-08-17 RX ORDER — NEBIVOLOL 10 MG/1
10 TABLET ORAL DAILY
Status: DISCONTINUED | OUTPATIENT
Start: 2019-08-17 | End: 2019-08-18 | Stop reason: HOSPADM

## 2019-08-17 RX ORDER — LOSARTAN POTASSIUM AND HYDROCHLOROTHIAZIDE 25; 100 MG/1; MG/1
1 TABLET ORAL DAILY
Status: DISCONTINUED | OUTPATIENT
Start: 2019-08-17 | End: 2019-08-17 | Stop reason: CLARIF

## 2019-08-17 RX ORDER — HYDROCHLOROTHIAZIDE 25 MG/1
25 TABLET ORAL DAILY
Status: DISCONTINUED | OUTPATIENT
Start: 2019-08-17 | End: 2019-08-18 | Stop reason: HOSPADM

## 2019-08-17 RX ADMIN — CLOPIDOGREL BISULFATE 75 MG: 75 TABLET ORAL at 16:02

## 2019-08-17 RX ADMIN — MORPHINE SULFATE 1 MG: 2 INJECTION, SOLUTION INTRAMUSCULAR; INTRAVENOUS at 04:19

## 2019-08-17 RX ADMIN — MORPHINE SULFATE 1 MG: 2 INJECTION, SOLUTION INTRAMUSCULAR; INTRAVENOUS at 21:18

## 2019-08-17 RX ADMIN — MORPHINE SULFATE 1 MG: 2 INJECTION, SOLUTION INTRAMUSCULAR; INTRAVENOUS at 09:06

## 2019-08-17 RX ADMIN — HYDROCHLOROTHIAZIDE 25 MG: 25 TABLET ORAL at 16:02

## 2019-08-17 RX ADMIN — LOSARTAN POTASSIUM 100 MG: 50 TABLET ORAL at 16:02

## 2019-08-17 RX ADMIN — Medication 10 ML: at 09:01

## 2019-08-17 RX ADMIN — SERTRALINE HYDROCHLORIDE 25 MG: 25 TABLET ORAL at 16:02

## 2019-08-17 RX ADMIN — SODIUM CHLORIDE: 9 INJECTION, SOLUTION INTRAVENOUS at 17:19

## 2019-08-17 RX ADMIN — PANTOPRAZOLE SODIUM 40 MG: 40 INJECTION, POWDER, FOR SOLUTION INTRAVENOUS at 09:00

## 2019-08-17 RX ADMIN — NEBIVOLOL 10 MG: 10 TABLET ORAL at 21:47

## 2019-08-17 RX ADMIN — ASPIRIN 81 MG: 81 TABLET, COATED ORAL at 16:02

## 2019-08-17 RX ADMIN — SODIUM CHLORIDE: 9 INJECTION, SOLUTION INTRAVENOUS at 09:02

## 2019-08-17 ASSESSMENT — PAIN DESCRIPTION - DESCRIPTORS
DESCRIPTORS: DISCOMFORT;ACHING
DESCRIPTORS: ACHING;DISCOMFORT

## 2019-08-17 ASSESSMENT — PAIN SCALES - GENERAL
PAINLEVEL_OUTOF10: 3
PAINLEVEL_OUTOF10: 0
PAINLEVEL_OUTOF10: 2
PAINLEVEL_OUTOF10: 2
PAINLEVEL_OUTOF10: 3
PAINLEVEL_OUTOF10: 3

## 2019-08-17 ASSESSMENT — PAIN DESCRIPTION - ORIENTATION
ORIENTATION: RIGHT;UPPER
ORIENTATION: UPPER;MID
ORIENTATION: RIGHT;UPPER

## 2019-08-17 ASSESSMENT — PAIN DESCRIPTION - PAIN TYPE
TYPE: ACUTE PAIN

## 2019-08-17 ASSESSMENT — PAIN DESCRIPTION - LOCATION
LOCATION: ABDOMEN

## 2019-08-17 ASSESSMENT — PAIN DESCRIPTION - DIRECTION
RADIATING_TOWARDS: BACK
RADIATING_TOWARDS: BACK

## 2019-08-17 ASSESSMENT — PAIN DESCRIPTION - FREQUENCY
FREQUENCY: CONTINUOUS
FREQUENCY: CONTINUOUS

## 2019-08-17 ASSESSMENT — PAIN - FUNCTIONAL ASSESSMENT: PAIN_FUNCTIONAL_ASSESSMENT: ACTIVITIES ARE NOT PREVENTED

## 2019-08-17 NOTE — PROGRESS NOTES
Ultrasound tech, Marion Smallwood returned page. States that they are only on call on the weekends for emergent cases. Nursing supervisor is updating Dr. Leno Wang via cell phone.

## 2019-08-17 NOTE — H&P
Patient:  Cecil Murphy  MRN: [id number]    Chief Complaint:    Chief Complaint   Patient presents with    Abdominal Pain     ongoing since tueday, states he hadnt had a BM until today after taking soemthing to help him go       History Obtained From:  patient, electronic medical record    PCP: [address]Kourt[address] Sheldon [facility] [address], APRN - CNP    History of Present Illness: The patient is a 54 y.o. male who presents with abdominal pain. Patient states she has had pain in the right upper quadrant to midepigastric region since eating a greasy meal on Tuesday. He states he then developed episodes of constipation which is not normal for him. He states that despite taking over-the-counter laxatives he has had no success and having a bowel movement and has had persistent abdominal pain. He does report a history of pancreatitis approximately 2 years ago and states this is somewhat similar nature. Therefore with concern for return of pancreatitis / identical to prior episode 5 years ago.     Past Medical History:        Diagnosis Date    Arthritis     CAD (coronary artery disease)     COPD (chronic obstructive pulmonary disease) ([address]     Disc disorder     GERD (gastroesophageal reflux disease)     Hypertension     Hypertriglyceridemia     COLEMAN (obstructive sleep apnea)     DOES NOT USE HIS CPAP MACHINE AS DIRECTED    Pancreatitis 2013    Normal Ultrasound GB 2013       Past Surgical History:        Procedure Laterality Date    CORONARY ANGIOPLASTY WITH STENT PLACEMENT  2004    Stent x 3 RCA    CORONARY ANGIOPLASTY WITH STENT PLACEMENT  3-    CORINA RCA    DIAGNOSTIC CARDIAC CATH LAB PROCEDURE      HERNIA REPAIR  4743    umbilical    KNEE ARTHROSCOPY      right knee for ACL repair    KNEE ARTHROSCOPY Left 08/18/2016    chondroplasty , open excision baker cyst - Dr. Larry Brooks Left 08/18/2016    Medial and lateral - Dr. Herson Menezes PTCA         Family History:       Problem Relation Age of Onset    Heart Disease external liquid Wash face, chest and back 1-2 times daily 1/2/18   Awais Emmanuel MD   clindamycin (CLEOCIN T) 1 % lotion Apply to face, chest and back daily 1/2/18   Awais Emmanuel MD   aspirin EC 81 MG EC tablet Take 1 tablet by mouth daily 7/2/17   Isreal Bryan MD   nitroGLYCERIN (NITROSTAT) 0.4 MG SL tablet Place 1 tablet under the tongue every 5 minutes as needed for Chest pain. 3/24/15   Olvin Vance MD       Review of Systems:  Constitutional: Negative for chills and fever. HENT: Negative for congestion and sore throat. Respiratory: Negative for cough. Cardiovascular: Negative for chest pain. Gastrointestinal: Positive for abdominal pain and constipation. Negative for diarrhea, nausea and vomiting. Genitourinary: Negative for dysuria. Musculoskeletal: Negative for back pain. Skin: Negative for color change and rash. Neurological: Negative for headaches. All other systems reviewed and are negative    Physical Exam:    Vitals:   Temp: 97.3 °F (36.3 °C)  BP: (!) 140/82  Resp: 18  Pulse: 63  SpO2: 93 %  24HR INTAKE/OUTPUT:      Intake/Output Summary (Last 24 hours) at 8/17/2019 0655  Last data filed at 8/17/2019 0515  Gross per 24 hour   Intake 757 ml   Output --   Net 757 ml       Exam:  GEN:  alert and oriented to person, place and time  EYES: PERRL and Sclera nonicteric  NECK: supple, no lymphadenopathy,  no carotid bruits  PULM: clear to auscultation bilaterally- no wheezes, rales or rhonchi, normal air movement, no respiratory distress  COR: regular rate & rhythm  ABD:  Tender epigastric area with no rebound. No CVA.   EXT:   no cyanosis, clubbing or edema present    NEURO: follows commands, FUNEZ, no deficits  SKIN:  no rashes or significant lesions  -----------------------------------------------------------------  Diagnostic Data:   Lab Results   Component Value Date    WBC 7.1 08/17/2019    HGB 13.5 08/17/2019    PLT See Reflexed IPF Result 08/17/2019       Lab Results upper pole of the left kidney is likely a cyst. GI/Bowel: Multifocal incomplete colonic distention is noted. There is no inflammatory change. No disproportionate small-bowel distention is noted. There is no pneumatosis Pelvis: Incomplete sigmoid distention is noted extending to the rectum. No focal abnormality of the bladder is noted. Vascular calcifications in the pelvis are noted. There is no fluid collection. There is no inflammatory change. Appendix is normal. Peritoneum/Retroperitoneum: Vascular calcifications are noted. There is no aneurysm. Several small mesenteric and retroperitoneal lymph nodes are noted. Small william hepatis nodes are present. There is no pathologic enlargement. Bones/Soft Tissues: Degenerative changes in the spine are noted. No bone destruction is noted. Multilevel disc bulging in the lumbar canal is noted. Canal detail is limited. Mild stranding in the fat near the pancreatic head. Correlate clinically as relates to potential pancreatitis. There are no stones in the common duct and there is no ductal dilatation. Multifocal incomplete colonic distention. This is likely due to simple underdistention and less likely due to pathologic wall thickening from colitis. There is no adjacent inflammatory change         Assessment:    Principal Problem:    Acute pancreatitis from High Fat Meal  Active Problems:    GERD (gastroesophageal reflux disease)  Resolved Problems:    * No resolved hospital problems.  *      Patient Active Problem List    Diagnosis Date Noted    Acute pancreatitis from High Fat Meal 08/16/2019    Anxiety 10/23/2017    Acute bronchitis 07/02/2017    Unstable angina [address] 07/01/2017    Precordial chest pain 07/01/2017    Smoker 07/01/2017    COPD (chronic obstructive pulmonary disease) (HCC)     Impaired fasting blood sugar 11/24/2015    Lumbar disc herniation with radiculopathy 09/26/2015    CAD (coronary artery disease) 03/27/2015    Presence of

## 2019-08-17 NOTE — PROGRESS NOTES
Pt updated on delay in ultrasound and message out to Dr. Cj Forde regarding delay. Pt understanding. Rating pain at [facility] a 2-3 out of 10\" PRN Morphine offered, pt accepting. Morphine administered. Pt reports wanting to attempt to get some sleep, has not been sleeping well. Writer shut off lights, closed door and placed sign outside pt's room.

## 2019-08-17 NOTE — ED PROVIDER NOTES
disease) ([facility]     Disc disorder     GERD (gastroesophageal reflux disease)     Hypertension     Hypertriglyceridemia     COLEMAN (obstructive sleep apnea)     DOES NOT USE HIS CPAP MACHINE AS DIRECTED    Pancreatitis 2013    Normal Ultrasound GB 2013       SURGICAL HISTORY       Past Surgical History:   Procedure Laterality Date    CORONARY ANGIOPLASTY WITH STENT PLACEMENT  2004    Stent x 3 RCA    CORONARY ANGIOPLASTY WITH STENT PLACEMENT  3-    CORINA RCA    DIAGNOSTIC CARDIAC CATH LAB PROCEDURE      HERNIA REPAIR  0216    umbilical    KNEE ARTHROSCOPY      right knee for ACL repair    KNEE ARTHROSCOPY Left 08/18/2016    chondroplasty , open excision baker cyst - Dr. Alyssa Shaikh Left 08/18/2016    Medial and lateral - Dr. Lakia Escalante       Current Discharge Medication List      CONTINUE these medications which have NOT CHANGED    Details   sertraline (ZOLOFT) 25 MG tablet Take 1 tablet by mouth daily  Qty: 90 tablet, Refills: 1    Associated Diagnoses: Anxiety      albuterol sulfate HFA (PROVENTIL HFA) 108 (90 Base) MCG/ACT inhaler Inhale 2 puffs into the lungs every 4 hours as needed for Wheezing Generic albuterol is ok. Qty: 1 Inhaler, Refills: 3    Associated Diagnoses: Wheeze; Chronic obstructive pulmonary disease, unspecified COPD type ([facility])      albuterol (PROVENTIL) (2.5 MG/3ML) 0.083% nebulizer solution Take 3 mLs by nebulization every 4 hours as needed for Wheezing (dispense nebules)  Qty: 50 vial, Refills: 5    Associated Diagnoses: Wheeze; Chronic obstructive pulmonary disease, unspecified COPD type ([facility]      ! ! clopidogrel (PLAVIX) 75 MG tablet Take 1 tablet by mouth daily  Qty: 90 tablet, Refills: 0      losartan-hydrochlorothiazide (HYZAAR) 100-25 MG per tablet Take 1 tablet by mouth daily  Qty: 90 tablet, Refills: 3      nebivolol (BYSTOLIC) 10 MG tablet Take 1 tablet by mouth daily  Qty: 90 tablet, Refills: 3      Multiple Vitamins-Minerals (CENTRUM ADULTS PO) Take 1 tablet by mouth daily      hydrochlorothiazide (HYDRODIURIL) 25 MG tablet Take 1 tablet by mouth daily PRN swelling  Qty: 30 tablet, Refills: 3      esomeprazole Magnesium (NEXIUM) 40 MG PACK Take 40 mg by mouth daily. !! clopidogrel (PLAVIX) 75 MG tablet TAKE 1 TABLET BY MOUTH 1 TIME EACH DAY  Qty: 90 tablet, Refills: 3      Respiratory Therapy Supplies (NEBULIZER COMPRESSOR) KIT 1 kit by Does not apply route once for 1 dose  Qty: 1 kit, Refills: 0      benzoyl peroxide 5 % external liquid Wash face, chest and back 1-2 times daily  Qty: 681 g, Refills: 4      clindamycin (CLEOCIN T) 1 % lotion Apply to face, chest and back daily  Qty: 240 mL, Refills: 4      aspirin EC 81 MG EC tablet Take 1 tablet by mouth daily  Qty: 30 tablet, Refills: 3      nitroGLYCERIN (NITROSTAT) 0.4 MG SL tablet Place 1 tablet under the tongue every 5 minutes as needed for Chest pain. Qty: 25 tablet, Refills: 3       !! - Potential duplicate medications found. Please discuss with provider. ALLERGIES     Chantix [varenicline]; Lipitor [atorvastatin]; and Livalo [pitavastatin]    FAMILY HISTORY       Family History   Problem Relation Age of Onset    Heart Disease Mother     Cancer Maternal Aunt         SOCIAL HISTORY       Social History     Socioeconomic History    Marital status:      Spouse name: None    Number of children: None    Years of education: None    Highest education level: None   Occupational History    None   Social Needs    Financial resource strain: None    Food insecurity:     Worry: None     Inability: None    Transportation needs:     Medical: None     Non-medical: None   Tobacco Use    Smoking status: Current Some Day Smoker     Packs/day: 1.00     Years: 25.00     Pack years: 25.00     Types: Cigarettes    Smokeless tobacco: Never Used    Tobacco comment: trying    Substance and Sexual Activity    Alcohol use:  Yes     Alcohol/week: 6.0 standard drinks     Types: 6 Glasses of wine per week     Comment: occ 6 GLASSES OF WINE A WEEK    Drug use: No    Sexual activity: Yes   Lifestyle    Physical activity:     Days per week: None     Minutes per session: None    Stress: None   Relationships    Social connections:     Talks on phone: None     Gets together: None     Attends Evangelical service: None     Active member of club or organization: None     Attends meetings of clubs or organizations: None     Relationship status: None    Intimate partner violence:     Fear of current or ex partner: None     Emotionally abused: None     Physically abused: None     Forced sexual activity: None   Other Topics Concern    None   Social History Narrative    None       SCREENINGS           PHYSICAL EXAM    (up to 7 for level 4, 8 or more for level 5)   @EDTRIAGEVSS    Physical Exam   Constitutional: He is oriented to person, place, and time. He appears well-developed and well-nourished. No distress. HENT:   Head: Normocephalic and atraumatic. Nose: Nose normal.   Mouth/Throat: Oropharynx is clear and moist. No oropharyngeal exudate. Eyes: Pupils are equal, round, and reactive to light. Conjunctivae and EOM are normal. Right eye exhibits no discharge. Left eye exhibits no discharge. No scleral icterus. Neck: Normal range of motion. Neck supple. Cardiovascular: Normal rate, regular rhythm, normal heart sounds and intact distal pulses. Exam reveals no gallop and no friction rub. No murmur heard. Radial pulses are plus 2 out of 4 bilaterally are equal and symmetric   Pulmonary/Chest: Effort normal and breath sounds normal. No stridor. No respiratory distress. He has no wheezes. Abdominal: Soft. He exhibits no distension. There is tenderness. There is guarding. Soft and nondistended with hypoactive bowel sounds. There is pain with palpation in the right upper quadrant and midepigastric region with voluntary guarding at the site.   Positive Rich Lasso sign.  No pulsatile mass   Musculoskeletal: Normal range of motion. He exhibits no edema, tenderness or deformity. Neurological: He is alert and oriented to person, place, and time. No cranial nerve deficit or sensory deficit. Skin: Skin is warm and dry. Capillary refill takes less than 2 seconds. No rash noted. He is not diaphoretic. No erythema. No pallor. No jaundice noted   Psychiatric: He has a normal mood and affect. His behavior is normal. Judgment and thought content normal.   Nursing note and vitals reviewed. DIAGNOSTIC RESULTS     EKG (Per Emergency Physician):       RADIOLOGY (Per Emergency Physician): Interpretation per the Radiologist below, if available at the time of this note:  Ct Abdomen Pelvis W Iv Contrast Additional Contrast? None    Result Date: 8/16/2019  EXAMINATION: CT OF THE ABDOMEN AND PELVIS WITH CONTRAST 8/16/2019 9:09 pm TECHNIQUE: CT of the abdomen and pelvis was performed with the administration of intravenous contrast. Multiplanar reformatted images are provided for review. Dose modulation, iterative reconstruction, and/or weight based adjustment of the mA/kV was utilized to reduce the radiation dose to as low as reasonably achievable. COMPARISON: August 2013 HISTORY: ORDERING SYSTEM PROVIDED HISTORY: Q pain TECHNOLOGIST PROVIDED HISTORY: FINDINGS: Lower Chest: Minimal pericardial fluid is noted. No pleural effusion is noted. Dependent atelectasis is noted. Organs: Low-density throughout the liver raises the question of fatty infiltration. No discrete liver lesion is noted. No gallstones are noted. No pancreatic lesions are noted. There is minimal stranding in the fat near the pancreatic head. There is no splenic lesion. No definitive adrenal masses are noted. Renal scarring on the left is noted. Renal scarring on the right is noted.   Low-density lesion in the upper pole of the left kidney is likely a cyst. GI/Bowel: Multifocal incomplete colonic distention is noted. There is no inflammatory change. No disproportionate small-bowel distention is noted. There is no pneumatosis Pelvis: Incomplete sigmoid distention is noted extending to the rectum. No focal abnormality of the bladder is noted. Vascular calcifications in the pelvis are noted. There is no fluid collection. There is no inflammatory change. Appendix is normal. Peritoneum/Retroperitoneum: Vascular calcifications are noted. There is no aneurysm. Several small mesenteric and retroperitoneal lymph nodes are noted. Small william hepatis nodes are present. There is no pathologic enlargement. Bones/Soft Tissues: Degenerative changes in the spine are noted. No bone destruction is noted. Multilevel disc bulging in the lumbar canal is noted. Canal detail is limited. Mild stranding in the fat near the pancreatic head. Correlate clinically as relates to potential pancreatitis. There are no stones in the common duct and there is no ductal dilatation. Multifocal incomplete colonic distention. This is likely due to simple underdistention and less likely due to pathologic wall thickening from colitis.   There is no adjacent inflammatory change       ED BEDSIDE ULTRASOUND:   Performed by ED Physician - none    LABS:  Labs Reviewed   CBC WITH AUTO DIFFERENTIAL - Abnormal; Notable for the following components:       Result Value    Seg Neutrophils 66 (*)     All other components within normal limits   BASIC METABOLIC PANEL - Abnormal; Notable for the following components:    Glucose 203 (*)     Sodium 133 (*)     Chloride 95 (*)     All other components within normal limits   HEPATIC FUNCTION PANEL - Abnormal; Notable for the following components:    ALT 67 (*)     AST 55 (*)     All other components within normal limits   LIPASE - Abnormal; Notable for the following components:    Lipase 97 (*)     All other components within normal limits   LACTIC ACID, PLASMA   CBC   COMPREHENSIVE METABOLIC PANEL W/ REFLEX TO MG FOR LOW K        All other labs were within normal range or not returned as of this dictation. EMERGENCY DEPARTMENT COURSE and DIFFERENTIAL DIAGNOSIS/MDM:   Vitals:    Vitals:    08/16/19 2009 08/16/19 2132 08/16/19 2245 08/16/19 2313   BP: (!) 147/93 136/84 134/81 (!) 155/91   Pulse: 69 64 67 64   Resp:  20 18 18   Temp:    97.5 °F (36.4 °C)   TempSrc:    Temporal   SpO2:  96% 97% 96%   Weight:    235 lb 3.2 oz (106.7 kg)   Height:    6' (1.829 m)       Medications   albuterol (PROVENTIL) nebulizer solution 2.5 mg (has no administration in time range)   sodium chloride flush 0.9 % injection 10 mL (has no administration in time range)   sodium chloride flush 0.9 % injection 10 mL (has no administration in time range)   ondansetron (ZOFRAN) injection 4 mg (has no administration in time range)   0.9 % sodium chloride infusion (has no administration in time range)   pantoprazole (PROTONIX) injection 40 mg (has no administration in time range)     And   sodium chloride (PF) 0.9 % injection 10 mL (has no administration in time range)   0.9 % sodium chloride bolus (0 mLs Intravenous Stopped 8/16/19 2240)   morphine injection 4 mg (4 mg Intravenous Given 8/16/19 2033)   ondansetron (ZOFRAN) injection 4 mg (4 mg Intravenous Given 8/16/19 2033)   iopamidol (ISOVUE-370) 76 % injection 75 mL (75 mLs Intravenous Given 8/16/19 2113)   morphine injection 4 mg (4 mg Intravenous Given 8/16/19 2243)       MDM. Patient arrived afebrile but had pain in his right upper quadrant and midepigastric region and reported a previous history of pancreatitis. Therefore this time there was concern for this and basic labs and a CT scan were obtained. Labs showed mild elevation to his lipase and liver enzymes. CT showed mild inflammation around the pancreatic head.   At this time as patient is been having persistent symptoms for the past 3 days and is requiring multiple doses of IV pain medication for symptom control he will be placed

## 2019-08-18 VITALS
HEART RATE: 57 BPM | RESPIRATION RATE: 16 BRPM | HEIGHT: 72 IN | DIASTOLIC BLOOD PRESSURE: 88 MMHG | OXYGEN SATURATION: 96 % | SYSTOLIC BLOOD PRESSURE: 157 MMHG | TEMPERATURE: 97.3 F | BODY MASS INDEX: 31.84 KG/M2 | WEIGHT: 235.1 LBS

## 2019-08-18 LAB
ESTIMATED AVERAGE GLUCOSE: 140 MG/DL
HBA1C MFR BLD: 6.5 % (ref 4.8–5.9)

## 2019-08-18 PROCEDURE — 6360000002 HC RX W HCPCS: Performed by: INTERNAL MEDICINE

## 2019-08-18 PROCEDURE — 2580000003 HC RX 258: Performed by: INTERNAL MEDICINE

## 2019-08-18 PROCEDURE — 6370000000 HC RX 637 (ALT 250 FOR IP): Performed by: INTERNAL MEDICINE

## 2019-08-18 RX ADMIN — ESOMEPRAZOLE MAGNESIUM 40 MG: 40 FOR SUSPENSION ORAL at 09:15

## 2019-08-18 RX ADMIN — SERTRALINE HYDROCHLORIDE 25 MG: 25 TABLET ORAL at 09:16

## 2019-08-18 RX ADMIN — CLOPIDOGREL BISULFATE 75 MG: 75 TABLET ORAL at 09:16

## 2019-08-18 RX ADMIN — MORPHINE SULFATE 1 MG: 2 INJECTION, SOLUTION INTRAMUSCULAR; INTRAVENOUS at 01:20

## 2019-08-18 RX ADMIN — LOSARTAN POTASSIUM 100 MG: 50 TABLET ORAL at 09:16

## 2019-08-18 RX ADMIN — MULTIPLE VITAMINS W/ MINERALS TAB 1 TABLET: TAB at 09:16

## 2019-08-18 RX ADMIN — NEBIVOLOL 10 MG: 10 TABLET ORAL at 09:16

## 2019-08-18 RX ADMIN — HYDROCHLOROTHIAZIDE 25 MG: 25 TABLET ORAL at 09:16

## 2019-08-18 RX ADMIN — ASPIRIN 81 MG: 81 TABLET, COATED ORAL at 09:16

## 2019-08-18 RX ADMIN — SODIUM CHLORIDE: 9 INJECTION, SOLUTION INTRAVENOUS at 01:21

## 2019-08-18 ASSESSMENT — PAIN DESCRIPTION - ORIENTATION: ORIENTATION: MID;UPPER

## 2019-08-18 ASSESSMENT — PAIN DESCRIPTION - LOCATION: LOCATION: ABDOMEN

## 2019-08-18 ASSESSMENT — PAIN SCALES - GENERAL
PAINLEVEL_OUTOF10: 0
PAINLEVEL_OUTOF10: 4
PAINLEVEL_OUTOF10: 4

## 2019-08-18 ASSESSMENT — PAIN DESCRIPTION - PAIN TYPE: TYPE: ACUTE PAIN

## 2019-08-18 ASSESSMENT — PAIN DESCRIPTION - DESCRIPTORS: DESCRIPTORS: SHARP

## 2019-08-18 ASSESSMENT — PAIN - FUNCTIONAL ASSESSMENT: PAIN_FUNCTIONAL_ASSESSMENT: ACTIVITIES ARE NOT PREVENTED

## 2019-08-18 NOTE — PLAN OF CARE
Problem: Pain:  Goal: Control of acute pain  Description  Control of acute pain  Outcome: Ongoing  Note:   Patient is able to communicate when pain intervention is required. Patient is also able to rate the pain on a scale of 0-10. Pain is assessed with hourly rounding while patient is awake and pain medication administered as needed and reassessed. Problem: Fluid Volume:  Goal: Will maintain adequate fluid volume  Description  Will maintain adequate fluid volume  Outcome: Ongoing  Note:   Continuous IV fluid is infusing as ordered and patient is maintaining a normal urine output. Will continue to monitor.

## 2019-08-18 NOTE — DISCHARGE SUMMARY
Discharge Summary    Faheem Henry  :  1970  MRN:  [id number]    Admit date:  2019      Discharge date:   2019    Admitting Physician:  Andrew Carey MD    Discharge Diagnoses:      Principal Problem:    Acute pancreatitis from High Fat Meal  Active Problems:    GERD (gastroesophageal reflux disease)    Impaired fasting blood sugar    COPD (chronic obstructive pulmonary disease) ([facility]  Resolved Problems:    * No resolved hospital problems. *      Active Hospital Problems    Diagnosis Date Noted    Acute pancreatitis from High Fat Meal [K85.90] 2019    COPD (chronic obstructive pulmonary disease) ([facility] [J44.9]     Impaired fasting blood sugar [R73.01] 2015    GERD (gastroesophageal reflux disease) [K21.9] 2013       Discharge Medications:       Joyce Smith   Home Medication Instructions LSF:[id number]    Printed on:19 0716   Medication Information                      albuterol (PROVENTIL) (2.5 MG/3ML) 0.083% nebulizer solution  Take 3 mLs by nebulization every 4 hours as needed for Wheezing (dispense nebules)             albuterol sulfate HFA (PROVENTIL HFA) 108 (90 Base) MCG/ACT inhaler  Inhale 2 puffs into the lungs every 4 hours as needed for Wheezing Generic albuterol is ok. aspirin EC 81 MG EC tablet  Take 1 tablet by mouth daily             benzoyl peroxide 5 % external liquid  Wash face, chest and back 1-2 times daily             clindamycin (CLEOCIN T) 1 % lotion  Apply to face, chest and back daily             clopidogrel (PLAVIX) 75 MG tablet  Take 1 tablet by mouth daily             esomeprazole Magnesium (NEXIUM) 40 MG PACK  Take 40 mg by mouth daily.              losartan-hydrochlorothiazide (HYZAAR) 100-25 MG per tablet  Take 1 tablet by mouth daily             Multiple Vitamins-Minerals (CENTRUM ADULTS PO)  Take 1 tablet by mouth daily             nebivolol (BYSTOLIC) 10 MG tablet  Take 1 tablet by mouth daily             Respiratory

## 2019-08-19 ENCOUNTER — CARE COORDINATION (OUTPATIENT)
Dept: CASE MANAGEMENT | Age: 49
End: 2019-08-19

## 2019-08-20 ENCOUNTER — CARE COORDINATION (OUTPATIENT)
Dept: CASE MANAGEMENT | Age: 49
End: 2019-08-20

## 2019-08-21 ENCOUNTER — CARE COORDINATION (OUTPATIENT)
Dept: CASE MANAGEMENT | Age: 49
End: 2019-08-21

## 2019-08-21 NOTE — CARE COORDINATION
[facility] Transitions Initial Follow Up Call    Call within 2 business days of discharge: Yes    Patient: Sandra Lindsay Patient : 1970   MRN: <[id number]>  Reason for Admission:   Discharge Date: 19 RARS: Readmission Risk Score: 7      Last Discharge [facility]       Complaint Diagnosis Description Type Department Provider    19 Abdominal Pain Idiopathic acute pancreatitis without infection or necrosis ED to Hosp-Admission (Discharged) (ADMITTED) Marty Elizondo MD; Magdalene Pacheco And. .. Facility: [facility]    Attempted to contact patient for transitions call. Contact information left to  requesting call back at the earliest convenience. Noemi Calixto RN BSN   Care Transitions Nurse  [phone]       Care Transitions 24 Hour Call    Care Transitions Interventions         Follow Up  No future appointments.     Noemi Calixto RN

## 2019-10-21 ENCOUNTER — TELEPHONE (OUTPATIENT)
Dept: FAMILY MEDICINE CLINIC | Age: 49
End: 2019-10-21

## 2019-10-21 DIAGNOSIS — F41.9 ANXIETY: ICD-10-CM

## 2019-10-24 RX ORDER — SERTRALINE HYDROCHLORIDE 25 MG/1
25 TABLET, FILM COATED ORAL DAILY
Qty: 90 TABLET | Refills: 0 | Status: SHIPPED | OUTPATIENT
Start: 2019-10-24 | End: 2020-01-31

## 2019-11-26 PROBLEM — Z79.02 ENCOUNTER FOR CURRENT LONG TERM USE OF ANTIPLATELET DRUG: Status: ACTIVE | Noted: 2019-11-26

## 2019-11-26 PROBLEM — Z78.9 STATIN INTOLERANCE: Status: ACTIVE | Noted: 2019-11-26

## 2020-01-31 RX ORDER — SERTRALINE HYDROCHLORIDE 25 MG/1
TABLET, FILM COATED ORAL
Qty: 90 TABLET | Refills: 0 | Status: SHIPPED | OUTPATIENT
Start: 2020-01-31 | End: 2020-05-06 | Stop reason: SDUPTHER

## 2020-01-31 NOTE — TELEPHONE ENCOUNTER
CZL:[id number]  ROV:1 year my chart message sent   BNT:[id number]  Health Maintenance   Topic Date Due    Diabetic foot exam  01/30/1982    Diabetic retinal exam  01/30/1982    HIV screen  01/30/1987    Diabetic microalbuminuria test  01/30/1990    Lipid screen  07/01/2018    Flu vaccine (1) 09/01/2019    A1C test (Diabetic or Prediabetic)  08/17/2020    Potassium monitoring  08/17/2020    Creatinine monitoring  08/17/2020    DTaP/Tdap/Td vaccine (2 - Td) 10/23/2027    Pneumococcal 0-64 years Vaccine  Completed             (applicable per patient's age: Cancer Screenings, Depression Screening, Fall Risk Screening, Immunizations)    Hemoglobin A1C (%)   Date Value   08/17/2019 6.5 (H)   11/24/2015 6.3 (H)     LDL Cholesterol (mg/dL)   Date Value   07/01/2017 61     AST (U/L)   Date Value   08/17/2019 43 (H)     ALT (U/L)   Date Value   08/17/2019 58 (H)     BUN (mg/dL)   Date Value   08/17/2019 10      (goal A1C is < 7)   (goal LDL is <100) need 30-50% reduction from baseline     BP Readings from Last 3 Encounters:   11/26/19 110/60   08/18/19 (!) 157/88   11/20/18 134/86    (goal /80)      All Future Testing planned in [facility]:  Lab Frequency Next Occurrence       Next Visit Date:  No future appointments.          Patient Active Problem List:     Hypertriglyceridemia     Hypertension     GERD (gastroesophageal reflux disease)     CAD (coronary artery disease)     Presence of drug coated stent in right coronary artery     S/P cardiac catheterization  3/27/2015 [facility]     Lumbar disc herniation with radiculopathy     Impaired fasting blood sugar     Unstable angina (HCC)     COPD (chronic obstructive pulmonary disease) (HCC)     Precordial chest pain     Smoker     Acute bronchitis     Anxiety     Acute pancreatitis from High Fat Meal     Statin intolerance     Encounter for current long term use of antiplatelet drug

## 2020-03-15 RX ORDER — ALBUTEROL SULFATE 2.5 MG/3ML
2.5 SOLUTION RESPIRATORY (INHALATION) EVERY 4 HOURS PRN
Qty: 50 VIAL | Refills: 5 | Status: SHIPPED | OUTPATIENT
Start: 2020-03-15 | End: 2021-04-09 | Stop reason: SDUPTHER

## 2020-03-15 RX ORDER — ALBUTEROL SULFATE 90 UG/1
2 AEROSOL, METERED RESPIRATORY (INHALATION) EVERY 4 HOURS PRN
Qty: 1 INHALER | Refills: 5 | Status: SHIPPED | OUTPATIENT
Start: 2020-03-15 | End: 2021-04-09 | Stop reason: SDUPTHER

## 2020-05-06 ENCOUNTER — TELEMEDICINE (OUTPATIENT)
Dept: FAMILY MEDICINE CLINIC | Age: 50
End: 2020-05-06
Payer: COMMERCIAL

## 2020-05-06 PROCEDURE — 99214 OFFICE O/P EST MOD 30 MIN: CPT | Performed by: NURSE PRACTITIONER

## 2020-05-06 RX ORDER — SERTRALINE HYDROCHLORIDE 25 MG/1
25 TABLET, FILM COATED ORAL DAILY
Qty: 90 TABLET | Refills: 1 | Status: SHIPPED | OUTPATIENT
Start: 2020-05-06 | End: 2020-12-18 | Stop reason: SDUPTHER

## 2020-05-06 ASSESSMENT — ENCOUNTER SYMPTOMS
ABDOMINAL PAIN: 0
RESPIRATORY NEGATIVE: 1
DIARRHEA: 0
BLOOD IN STOOL: 0
CONSTIPATION: 0
NAUSEA: 0
ABDOMINAL DISTENTION: 0

## 2020-05-06 NOTE — PROGRESS NOTES
Respiratory Therapy Supplies (NEBULIZER COMPRESSOR) KIT 1 kit by Does not apply route once for 1 dose 1 kit 0    Multiple Vitamins-Minerals (CENTRUM ADULTS PO) Take 1 tablet by mouth daily      aspirin EC 81 MG EC tablet Take 1 tablet by mouth daily 30 tablet 3    esomeprazole Magnesium (NEXIUM) 40 MG PACK Take 40 mg by mouth daily. No current facility-administered medications for this visit. Allergies   Allergen Reactions    Chantix [Varenicline] Other (See Comments)     Chest pain    Lipitor [Atorvastatin] Other (See Comments)     myalgias    Livalo [Pitavastatin] Other (See Comments)     Myalgias        Subjective:  Review of Systems   Constitutional: Negative for appetite change, fatigue and fever. Eyes:        Glasses. Respiratory: Negative. Cardiovascular: Negative. Gastrointestinal: Negative for abdominal distention, abdominal pain, blood in stool, constipation, diarrhea and nausea. Looser stools, normal for him. Genitourinary: Negative for difficulty urinating. Neurological: Negative. Psychiatric/Behavioral: The patient is nervous/anxious (at times. ). Objective:  Physical Exam  Vitals signs reviewed. Constitutional:       Appearance: Normal appearance. HENT:      Head: Normocephalic. Right Ear: External ear normal.      Left Ear: External ear normal.      Nose: Nose normal.      Mouth/Throat:      Mouth: Mucous membranes are moist.   Eyes:      Conjunctiva/sclera: Conjunctivae normal.   Neck:      Musculoskeletal: Normal range of motion. Pulmonary:      Effort: Pulmonary effort is normal. No respiratory distress. Skin:     Findings: No erythema or rash. Neurological:      General: No focal deficit present. Mental Status: He is alert and oriented to person, place, and time. Psychiatric:         Mood and Affect: Mood normal.         Thought Content:  Thought content normal.         Judgment: Judgment normal.         Due to this being a TeleHealth encounter, evaluation of the following organ systems is limited: Vitals/Constitutional/EENT/Resp/CV/GI//MS/Neuro/Skin/Heme-Lymph-Imm. Assessment:  1. Impaired fasting blood sugar    - Microalbumin, Ur; Future  - Comprehensive Metabolic Panel; Future  - Hemoglobin A1C; Future    2. Anxiety    - sertraline (ZOLOFT) 25 MG tablet; Take 1 tablet by mouth daily  Dispense: 90 tablet; Refill: 1    3. Hypertriglyceridemia    - Lipid Panel; Future    4. Essential hypertension    - Comprehensive Metabolic Panel; Future    5. Encounter for screening for HIV    - HIV Screen; Future      Plan:  Return in about 6 months (around 11/6/2020) for physical.    Zoloft refilled. Routine fasting blood work. Follow-up with cardiology as planned. Call office with concerns. An  electronic signature was used to authenticate this note. --GILMA Carlos - CNP on 5/6/2020 at 2:47 PM19}    This is a telehealth visit that was performed with the originating site at Patient Location: [place] [address] and Provider Location of [place], [place]. Verbal consent to participate in video visit was obtained. Pursuant to the emergency declaration under the [facility], 1135 waiver authority and the [employer] Act, this Virtual Visit was conducted, with patient's consent, to reduce the patient's risk of exposure to COVID-19 and provide continuity of care for an established/new patient. Services were provided through a video synchronous discussion virtually to substitute for in-person clinic visit.  I discussed with the patient the nature of our telehealth visits via interactive/real-time audio/video that:  - I would evaluate the patient and recommend diagnostics and treatments based on my assessment  - Our sessions are not being recorded and that personal health information is protected  - Our team would provide follow up care in person if/when the patient needs it. Services were provided through a video synchronous discussion virtually to substitute for in-person clinic visit.

## 2020-05-19 ENCOUNTER — E-VISIT (OUTPATIENT)
Dept: FAMILY MEDICINE CLINIC | Age: 50
End: 2020-05-19
Payer: COMMERCIAL

## 2020-05-19 PROCEDURE — 99421 OL DIG E/M SVC 5-10 MIN: CPT | Performed by: NURSE PRACTITIONER

## 2020-05-22 ENCOUNTER — HOSPITAL ENCOUNTER (OUTPATIENT)
Age: 50
Discharge: HOME | End: 2020-05-22
Payer: COMMERCIAL

## 2020-05-22 LAB
ALBUMIN SERPL-MCNC: 4.6 G/DL (ref 3.5–5.2)
ALBUMIN/GLOBULIN RATIO: 1.6 (ref 1–2.5)
ALP BLD-CCNC: 55 U/L (ref 40–129)
ALT SERPL-CCNC: 55 U/L (ref 5–41)
ANION GAP SERPL CALCULATED.3IONS-SCNC: 16 MMOL/L (ref 9–17)
AST SERPL-CCNC: 55 U/L
BILIRUB SERPL-MCNC: 0.5 MG/DL (ref 0.3–1.2)
BUN BLDV-MCNC: 19 MG/DL (ref 6–20)
BUN/CREAT BLD: 22 (ref 9–20)
CALCIUM SERPL-MCNC: 9.5 MG/DL (ref 8.6–10.4)
CHLORIDE BLD-SCNC: 96 MMOL/L (ref 98–107)
CHOLESTEROL/HDL RATIO: 3.2
CHOLESTEROL: 197 MG/DL
CO2: 24 MMOL/L (ref 20–31)
CREAT SERPL-MCNC: 0.86 MG/DL (ref 0.7–1.2)
CREATININE URINE: 185.6 MG/DL (ref 39–259)
ESTIMATED AVERAGE GLUCOSE: 128 MG/DL
GFR AFRICAN AMERICAN: >60 ML/MIN
GFR NON-AFRICAN AMERICAN: >60 ML/MIN
GFR SERPL CREATININE-BSD FRML MDRD: ABNORMAL ML/MIN/{1.73_M2}
GFR SERPL CREATININE-BSD FRML MDRD: ABNORMAL ML/MIN/{1.73_M2}
GLUCOSE BLD-MCNC: 119 MG/DL (ref 70–99)
HBA1C MFR BLD: 6.1 % (ref 4.8–5.9)
HDLC SERPL-MCNC: 61 MG/DL
HIV AG/AB: NONREACTIVE
LDL CHOLESTEROL: 66 MG/DL (ref 0–130)
MICROALBUMIN/CREAT 24H UR: 45 MG/L
MICROALBUMIN/CREAT UR-RTO: 24 MCG/MG CREAT
POTASSIUM SERPL-SCNC: 4.7 MMOL/L (ref 3.7–5.3)
RHEUMATOID FACTOR: 11 IU/ML
SODIUM BLD-SCNC: 136 MMOL/L (ref 135–144)
TOTAL PROTEIN: 7.5 G/DL (ref 6.4–8.3)
TRIGL SERPL-MCNC: 352 MG/DL
VLDLC SERPL CALC-MCNC: ABNORMAL MG/DL (ref 1–30)

## 2020-05-22 PROCEDURE — 87389 HIV-1 AG W/HIV-1&-2 AB AG IA: CPT

## 2020-05-22 PROCEDURE — 80053 COMPREHEN METABOLIC PANEL: CPT

## 2020-05-22 PROCEDURE — 83036 HEMOGLOBIN GLYCOSYLATED A1C: CPT

## 2020-05-22 PROCEDURE — 80061 LIPID PANEL: CPT

## 2020-05-22 PROCEDURE — 82570 ASSAY OF URINE CREATININE: CPT

## 2020-05-22 PROCEDURE — 86038 ANTINUCLEAR ANTIBODIES: CPT

## 2020-05-22 PROCEDURE — 36415 COLL VENOUS BLD VENIPUNCTURE: CPT

## 2020-05-22 PROCEDURE — 82043 UR ALBUMIN QUANTITATIVE: CPT

## 2020-05-22 PROCEDURE — 86431 RHEUMATOID FACTOR QUANT: CPT

## 2020-05-26 LAB — ANTI-NUCLEAR ANTIBODY (ANA): NEGATIVE

## 2020-12-18 RX ORDER — SERTRALINE HYDROCHLORIDE 25 MG/1
25 TABLET, FILM COATED ORAL DAILY
Qty: 90 TABLET | Refills: 1 | Status: SHIPPED | OUTPATIENT
Start: 2020-12-18 | End: 2021-07-16

## 2020-12-18 NOTE — TELEPHONE ENCOUNTER
LOV & LRF 5-6-20    Health Maintenance   Topic Date Due    Flu vaccine (1) 09/01/2020    A1C test (Diabetic or Prediabetic)  05/22/2021    Potassium monitoring  05/22/2021    Creatinine monitoring  05/22/2021    Lipid screen  05/22/2025    DTaP/Tdap/Td vaccine (2 - Td) 10/23/2027    Pneumococcal 0-64 years Vaccine  Completed    HIV screen  Completed    Hepatitis A vaccine  Aged Out    Hepatitis B vaccine  Aged Out    Hib vaccine  Aged Out    Meningococcal (ACWY) vaccine  Aged Out             (applicable per patient's age: Cancer Screenings, Depression Screening, Fall Risk Screening, Immunizations)    Hemoglobin A1C (%)   Date Value   05/22/2020 6.1 (H)   08/17/2019 6.5 (H)   11/24/2015 6.3 (H)     Microalb/Crt. Ratio (mcg/mg creat)   Date Value   05/22/2020 24 (H)     LDL Cholesterol (mg/dL)   Date Value   05/22/2020 66     AST (U/L)   Date Value   05/22/2020 55 (H)     ALT (U/L)   Date Value   05/22/2020 55 (H)     BUN (mg/dL)   Date Value   05/22/2020 19      (goal A1C is < 7)   (goal LDL is <100) need 30-50% reduction from baseline     BP Readings from Last 3 Encounters:   11/26/19 110/60   08/18/19 (!) 157/88   11/20/18 134/86    (goal /80)      All Future Testing planned in [facility]:  Lab Frequency Next Occurrence   Comprehensive Metabolic Panel, Fasting Once 08/26/2020       Next Visit Date:  No future appointments.          Patient Active Problem List:     Hypertriglyceridemia     Hypertension     GERD (gastroesophageal reflux disease)     CAD (coronary artery disease)     Presence of drug coated stent in right coronary artery     S/P cardiac catheterization  3/27/2015 [facility]     Lumbar disc herniation with radiculopathy     Impaired fasting blood sugar     Unstable angina (HCC)     COPD (chronic obstructive pulmonary disease) (HCC)     Precordial chest pain     Smoker     Acute bronchitis     Anxiety     Acute pancreatitis from High Fat Meal     Statin intolerance     Encounter for current long term use of antiplatelet drug

## 2021-04-09 DIAGNOSIS — J44.9 CHRONIC OBSTRUCTIVE PULMONARY DISEASE, UNSPECIFIED COPD TYPE (HCC): ICD-10-CM

## 2021-04-09 DIAGNOSIS — R06.2 WHEEZE: ICD-10-CM

## 2021-04-09 RX ORDER — ALBUTEROL SULFATE 2.5 MG/3ML
2.5 SOLUTION RESPIRATORY (INHALATION) EVERY 4 HOURS PRN
Qty: 50 VIAL | Refills: 5 | Status: SHIPPED | OUTPATIENT
Start: 2021-04-09 | End: 2021-11-09 | Stop reason: SDUPTHER

## 2021-04-09 RX ORDER — ALBUTEROL SULFATE 90 UG/1
2 AEROSOL, METERED RESPIRATORY (INHALATION) EVERY 4 HOURS PRN
Qty: 1 INHALER | Refills: 5 | Status: SHIPPED | OUTPATIENT
Start: 2021-04-09 | End: 2021-11-09 | Stop reason: SDUPTHER

## 2021-04-09 NOTE — PROGRESS NOTES
Patient contacted office today needing refill on his albuterol per nebulizer as well as inhaler due to chronic bronchitis worsening with allergy season.

## 2021-08-13 DIAGNOSIS — Z95.5 PRESENCE OF DRUG COATED STENT IN RIGHT CORONARY ARTERY: ICD-10-CM

## 2021-08-13 DIAGNOSIS — I25.10 CORONARY ARTERY DISEASE INVOLVING NATIVE CORONARY ARTERY OF NATIVE HEART WITHOUT ANGINA PECTORIS: Primary | ICD-10-CM

## 2021-08-13 RX ORDER — CLOPIDOGREL BISULFATE 75 MG/1
75 TABLET ORAL DAILY
Qty: 90 TABLET | Refills: 0 | Status: SHIPPED | OUTPATIENT
Start: 2021-08-13 | End: 2021-11-15

## 2021-08-13 NOTE — TELEPHONE ENCOUNTER
LOV 5-6-20  LRF 2-25-20    Health Maintenance   Topic Date Due    Hepatitis C screen  Never done    COVID-19 Vaccine (1) Never done    Colon cancer screen colonoscopy  Never done    A1C test (Diabetic or Prediabetic)  05/22/2021    Potassium monitoring  05/22/2021    Creatinine monitoring  05/22/2021    Flu vaccine (1) 09/01/2021    Lipid screen  05/22/2025    DTaP/Tdap/Td vaccine (2 - Td or Tdap) 10/23/2027    Pneumococcal 0-64 years Vaccine (2 of 2 - PPSV23) 01/30/2037    HIV screen  Completed    Hepatitis A vaccine  Aged Out    Hepatitis B vaccine  Aged Out    Hib vaccine  Aged Out    Meningococcal (ACWY) vaccine  Aged Out             (applicable per patient's age: Cancer Screenings, Depression Screening, Fall Risk Screening, Immunizations)    Hemoglobin A1C (%)   Date Value   05/22/2020 6.1 (H)   08/17/2019 6.5 (H)   11/24/2015 6.3 (H)     Microalb/Crt.  Ratio (mcg/mg creat)   Date Value   05/22/2020 24 (H)     LDL Cholesterol (mg/dL)   Date Value   05/22/2020 66     AST (U/L)   Date Value   05/22/2020 55 (H)     ALT (U/L)   Date Value   05/22/2020 55 (H)     BUN (mg/dL)   Date Value   05/22/2020 19      (goal A1C is < 7)   (goal LDL is <100) need 30-50% reduction from baseline     BP Readings from Last 3 Encounters:   11/26/19 110/60   08/18/19 (!) 157/88   11/20/18 134/86    (goal /80)      All Future Testing planned in [facility]:  Lab Frequency Next Occurrence       Next Visit Date:  Future Appointments   Date Time Provider Amparo Rowe   8/26/2021 11:00 AM GILMA Douglas - [place] [facility]            Patient Active Problem List:     Hypertriglyceridemia     Hypertension     GERD (gastroesophageal reflux disease)     CAD (coronary artery disease)     Presence of drug coated stent in right coronary artery     S/P cardiac catheterization  3/27/2015 [facility]     Lumbar disc herniation with radiculopathy     Impaired fasting blood sugar     Unstable angina (HCC)     COPD (chronic

## 2021-08-26 ENCOUNTER — OFFICE VISIT (OUTPATIENT)
Dept: FAMILY MEDICINE CLINIC | Age: 51
End: 2021-08-26
Payer: COMMERCIAL

## 2021-08-26 VITALS
WEIGHT: 217 LBS | SYSTOLIC BLOOD PRESSURE: 152 MMHG | DIASTOLIC BLOOD PRESSURE: 102 MMHG | BODY MASS INDEX: 30.38 KG/M2 | TEMPERATURE: 98.4 F | HEART RATE: 84 BPM | OXYGEN SATURATION: 96 % | RESPIRATION RATE: 16 BRPM | HEIGHT: 71 IN

## 2021-08-26 DIAGNOSIS — I10 ESSENTIAL HYPERTENSION: Primary | ICD-10-CM

## 2021-08-26 DIAGNOSIS — E78.1 HYPERTRIGLYCERIDEMIA: ICD-10-CM

## 2021-08-26 DIAGNOSIS — Z12.5 SCREENING FOR PROSTATE CANCER: ICD-10-CM

## 2021-08-26 DIAGNOSIS — F17.200 SMOKER: ICD-10-CM

## 2021-08-26 DIAGNOSIS — I25.10 CORONARY ARTERY DISEASE INVOLVING NATIVE CORONARY ARTERY OF NATIVE HEART WITHOUT ANGINA PECTORIS: ICD-10-CM

## 2021-08-26 DIAGNOSIS — Z95.5 PRESENCE OF DRUG COATED STENT IN RIGHT CORONARY ARTERY: ICD-10-CM

## 2021-08-26 DIAGNOSIS — F41.9 ANXIETY: ICD-10-CM

## 2021-08-26 DIAGNOSIS — M51.16 LUMBAR DISC HERNIATION WITH RADICULOPATHY: ICD-10-CM

## 2021-08-26 DIAGNOSIS — R73.03 PREDIABETES: ICD-10-CM

## 2021-08-26 PROCEDURE — 99396 PREV VISIT EST AGE 40-64: CPT | Performed by: NURSE PRACTITIONER

## 2021-08-26 RX ORDER — LOSARTAN POTASSIUM AND HYDROCHLOROTHIAZIDE 25; 100 MG/1; MG/1
1 TABLET ORAL DAILY
Qty: 90 TABLET | Refills: 1 | Status: SHIPPED | OUTPATIENT
Start: 2021-08-26

## 2021-08-26 RX ORDER — CARVEDILOL 12.5 MG/1
12.5 TABLET ORAL 2 TIMES DAILY
Qty: 180 TABLET | Refills: 1 | Status: SHIPPED | OUTPATIENT
Start: 2021-08-26 | End: 2021-11-15

## 2021-08-26 RX ORDER — SERTRALINE HYDROCHLORIDE 25 MG/1
25 TABLET, FILM COATED ORAL DAILY
Qty: 90 TABLET | Refills: 1 | Status: SHIPPED | OUTPATIENT
Start: 2021-08-26 | End: 2022-02-25

## 2021-08-26 ASSESSMENT — ENCOUNTER SYMPTOMS
BACK PAIN: 0
SORE THROAT: 0
RHINORRHEA: 0
SINUS PRESSURE: 0
WHEEZING: 0
NAUSEA: 0
CHEST TIGHTNESS: 0
DIARRHEA: 0
BLOOD IN STOOL: 0
COUGH: 0
CONSTIPATION: 0
SHORTNESS OF BREATH: 0
TROUBLE SWALLOWING: 0
ABDOMINAL DISTENTION: 0
ABDOMINAL PAIN: 0

## 2021-08-26 ASSESSMENT — PATIENT HEALTH QUESTIONNAIRE - PHQ9
1. LITTLE INTEREST OR PLEASURE IN DOING THINGS: 0
SUM OF ALL RESPONSES TO PHQ QUESTIONS 1-9: 0
SUM OF ALL RESPONSES TO PHQ9 QUESTIONS 1 & 2: 0
2. FEELING DOWN, DEPRESSED OR HOPELESS: 0
SUM OF ALL RESPONSES TO PHQ QUESTIONS 1-9: 0
SUM OF ALL RESPONSES TO PHQ QUESTIONS 1-9: 0

## 2021-08-26 ASSESSMENT — VISUAL ACUITY: OU: 1

## 2021-08-26 NOTE — PROGRESS NOTES
History and Physical      Joel Carballo  YOB: 1970    Date of Service:  8/26/2021    Chief Complaint:   Joel Carballo is a 54 y.o. male who presents for complete physical examination. Visit Summary  Patient is a pleasant 43-year-old  male. He presents to the office today for his yearly physical.  He has not been seen in approximately 2 years time. It is noted he followed with cardiology in the past.  Unfortunately, there is insurance restrictions and he has not seen cardiology in approximately 1 years time. Patient has significant cardiovascular history including 3 coronary stents. He does take antiplatelets daily. Patient states he has never had an MI, always chest pain, which led to cardiac catheterizations and stent placement. Patient also being treated for hyperlipidemia. Patient continues to smoke approximately 1 pack/day. He does drink alcohol routinely throughout the week. Patient has not had a stress test since 2015. This was after his last stent placement. Last echocardiogram was in cardiologist office. Patient was taken Bystolic for blood pressure/beta-blocker. He has been out for approximately 1 month. This is also too expensive. Patient also diagnosed with COPD. He does not take daily inhalers, he does have albuterol as needed. He states he rarely utilizes this. Patient also has diagnosis of sleep apnea. He has a CPAP, however he does not use. He has had it for approximately 2 years and only tried it for a few nights. He felt the settings and or the supplies were very uncomfortable. He never reached out further to get other supplies.         Wt Readings from Last 3 Encounters:   08/26/21 217 lb (98.4 kg)   11/26/19 237 lb (107.5 kg)   08/18/19 235 lb 1.6 oz (106.6 kg)     BP Readings from Last 3 Encounters:   08/26/21 (!) 152/102   11/26/19 110/60   08/18/19 (!) 157/88       Patient Active Problem List   Diagnosis    Hypertriglyceridemia    Hypertension    GERD (gastroesophageal reflux disease)    CAD (coronary artery disease)    Presence of drug coated stent in right coronary artery    S/P cardiac catheterization  3/27/2015 [facility]    Lumbar disc herniation with radiculopathy    Impaired fasting blood sugar    Unstable angina (HCC)    COPD (chronic obstructive pulmonary disease) (HCC)    Precordial chest pain    Smoker    Acute bronchitis    Anxiety    Acute pancreatitis from High Fat Meal    Statin intolerance    Encounter for current long term use of antiplatelet drug       Preventive Care:  Health Maintenance   Topic Date Due    Hepatitis C screen  Never done    COVID-19 Vaccine (1) Never done    Colon cancer screen colonoscopy  Never done    A1C test (Diabetic or Prediabetic)  05/22/2021    Potassium monitoring  05/22/2021    Creatinine monitoring  05/22/2021    Flu vaccine (1) 09/01/2021    Lipid screen  05/22/2025    DTaP/Tdap/Td vaccine (2 - Td or Tdap) 10/23/2027    Pneumococcal 0-64 years Vaccine (2 of 2 - PPSV23) 01/30/2037    HIV screen  Completed    Hepatitis A vaccine  Aged Out    Hepatitis B vaccine  Aged Out    Hib vaccine  Aged Out    Meningococcal (ACWY) vaccine  Aged Out      Self-testicular exams: No  Sexual activity: single partner, contraception - none   Last eye exam: within 2 two years, contacts in, reading glasses   Dental exam - current   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        Immunization History   Administered Date(s) Administered    Influenza Virus Vaccine 11/24/2015    Influenza, Noemí Ag, IM, (6 mo and older Fluzone, Flulaval, Fluarix and 3 yrs and older Afluria) 10/23/2017    Pneumococcal Polysaccharide ([id number]) 08/07/2013  Tdap (Boostrix, Adacel) 10/23/2017       Allergies   Allergen Reactions    Chantix [Varenicline] Other (See Comments)     Chest pain    Lipitor [Atorvastatin] Other (See Comments)     myalgias    Livalo [Pitavastatin] Other (See Comments)     Myalgias      Outpatient Medications Marked as Taking for the 8/26/21 encounter (Office Visit) with GILMA Esteban - CNP   Medication Sig Dispense Refill    sertraline (ZOLOFT) 25 MG tablet Take 1 tablet by mouth daily 90 tablet 1    losartan-hydroCHLOROthiazide (HYZAAR) 100-25 MG per tablet Take 1 tablet by mouth daily 90 tablet 1    carvedilol (COREG) 12.5 MG tablet Take 1 tablet by mouth 2 times daily 180 tablet 1    clopidogrel (PLAVIX) 75 MG tablet Take 1 tablet by mouth daily 90 tablet 0    albuterol (PROVENTIL) (2.5 MG/3ML) 0.083% nebulizer solution Take 3 mLs by nebulization every 4 hours as needed for Wheezing (dispense nebules) 50 vial 5    albuterol sulfate HFA (PROVENTIL HFA) 108 (90 Base) MCG/ACT inhaler Inhale 2 puffs into the lungs every 4 hours as needed for Wheezing Generic albuterol is ok. 1 Inhaler 5    Respiratory Therapy Supplies (NEBULIZER COMPRESSOR) KIT 1 kit by Does not apply route once for 1 dose 1 kit 0    Multiple Vitamins-Minerals (CENTRUM ADULTS PO) Take 1 tablet by mouth daily      aspirin EC 81 MG EC tablet Take 1 tablet by mouth daily 30 tablet 3    esomeprazole Magnesium (NEXIUM) 40 MG PACK Take 40 mg by mouth daily.          Past Medical History:   Diagnosis Date    Arthritis     CAD (coronary artery disease)     COPD (chronic obstructive pulmonary disease) ([address]     Disc disorder     GERD (gastroesophageal reflux disease)     Hypertension     Hypertriglyceridemia     COLEMAN (obstructive sleep apnea)     DOES NOT USE HIS CPAP MACHINE AS DIRECTED    Pancreatitis 2013    Normal Ultrasound GB 2013     Past Surgical History:   Procedure Laterality Date    CORONARY ANGIOPLASTY WITH STENT PLACEMENT  2004    Stent x 3 RCA  CORONARY ANGIOPLASTY WITH STENT PLACEMENT  03/27/2015    CORINA RCA   stents  total    DIAGNOSTIC CARDIAC CATH LAB PROCEDURE      HERNIA REPAIR  4484    umbilical    KNEE ARTHROSCOPY      right knee for ACL repair    KNEE ARTHROSCOPY Left 08/18/2016    chondroplasty , open excision guerrier cyst - Dr. Lulu Cogan Left 08/18/2016    Medial and lateral - Dr. Ko Ward PTCA       Family History   Problem Relation Age of Onset    Heart Disease Mother     Cancer Maternal Aunt      Social History     Socioeconomic History    Marital status:      Spouse name: Not on file    Number of children: Not on file    Years of education: Not on file    Highest education level: Not on file   Occupational History    Not on file   Tobacco Use    Smoking status: Current Some Day Smoker     Packs/day: 1.00     Years: 30.00     Pack years: 30.00     Types: Cigarettes    Smokeless tobacco: Never Used   Vaping Use    Vaping Use: Never used   Substance and Sexual Activity    Alcohol use: Not Currently     Alcohol/week: 3.0 standard drinks     Types: 3 Shots of liquor per week    Drug use: No    Sexual activity: Yes   Other Topics Concern    Not on file   Social History Narrative    Not on file     Social Determinants of Health     Financial Resource Strain:     Difficulty of Paying Living Expenses:    Food Insecurity:     Worried About Running Out of Food in the Last Year:     Ran Out of Food in the Last Year:    Transportation Needs:     Lack of Transportation (Medical):      Lack of Transportation (Non-Medical):    Physical Activity:     Days of Exercise per Week:     Minutes of Exercise per Session:    Stress:     Feeling of Stress :    Social Connections:     Frequency of Communication with Friends and Family:     Frequency of Social Gatherings with Friends and Family:     Attends Protestant Services:     Active Member of Clubs or Organizations:     Attends Club or Organization Meetings:  Marital Status:    Intimate Partner Violence:     Fear of Current or Ex-Partner:     Emotionally Abused:     Physically Abused:     Sexually Abused:        Review of Systems:  Review of Systems   Constitutional: Negative for activity change, appetite change, chills, fatigue and fever. HENT: Negative for congestion, ear pain, postnasal drip, rhinorrhea, sinus pressure, sneezing, sore throat and trouble swallowing. Respiratory: Negative for cough, chest tightness, shortness of breath and wheezing. Cardiovascular: Negative for chest pain, palpitations and leg swelling. Gastrointestinal: Negative for abdominal distention, abdominal pain, blood in stool, constipation, diarrhea and nausea. Genitourinary: Negative for difficulty urinating, discharge, dysuria, flank pain, frequency, hematuria, penile pain, penile swelling, scrotal swelling, testicular pain and urgency. Nocturia    Musculoskeletal: Negative for arthralgias (generalized arthritis ), back pain, gait problem, joint swelling and myalgias. Skin: Negative for rash and wound. Allergic/Immunologic: Negative for environmental allergies. Neurological: Negative for dizziness, syncope, light-headedness, numbness and headaches. Hematological: Does not bruise/bleed easily. Psychiatric/Behavioral: Positive for agitation and sleep disturbance (difficulty falling asleep ). Negative for decreased concentration, self-injury and suicidal ideas. The patient is not nervous/anxious. Has CPAP - does not wear          Physical Exam:   Vitals:    08/26/21 1109 08/26/21 1148   BP: (!) 170/96 (!) 152/102   Site: Left Upper Arm Left Upper Arm   Position: Sitting Sitting   Cuff Size: Medium Adult Medium Adult   Pulse: 84    Resp: 16    Temp: 98.4 °F (36.9 °C)    TempSrc: Temporal    SpO2: 96%    Weight: 217 lb (98.4 kg)    Height: 5' 11\" (1.803 m)      Body mass index is 30.27 kg/m². Physical Exam  Vitals and nursing note reviewed. Constitutional:       General: He is not in acute distress. Appearance: Normal appearance. He is well-developed, well-groomed and overweight. He is not ill-appearing or toxic-appearing. HENT:      Head: Normocephalic. Right Ear: Tympanic membrane, ear canal and external ear normal. No middle ear effusion. There is no impacted cerumen. Tympanic membrane is not erythematous, retracted or bulging. Left Ear: Tympanic membrane, ear canal and external ear normal.  No middle ear effusion. There is no impacted cerumen. Tympanic membrane is not erythematous, retracted or bulging. Nose: Nose normal. No mucosal edema, congestion or rhinorrhea. Right Turbinates: Not enlarged or swollen. Left Turbinates: Not enlarged or swollen. Right Sinus: No maxillary sinus tenderness or frontal sinus tenderness. Left Sinus: No maxillary sinus tenderness or frontal sinus tenderness. Mouth/Throat:      Lips: Pink. Mouth: Mucous membranes are moist.      Pharynx: Oropharynx is clear. No oropharyngeal exudate, posterior oropharyngeal erythema or uvula swelling. Eyes:      General: Lids are normal. Vision grossly intact. No allergic shiner. Conjunctiva/sclera: Conjunctivae normal.   Cardiovascular:      Rate and Rhythm: Normal rate and regular rhythm. No extrasystoles are present. Pulses: Normal pulses. Radial pulses are 2+ on the right side and 2+ on the left side. Dorsalis pedis pulses are 2+ on the right side and 2+ on the left side. Heart sounds: Normal heart sounds, S1 normal and S2 normal. No murmur heard. Pulmonary:      Effort: Pulmonary effort is normal. No accessory muscle usage, prolonged expiration or respiratory distress. Breath sounds: Normal breath sounds and air entry. No wheezing, rhonchi or rales. Abdominal:      General: There is no distension. Palpations: Abdomen is soft. Tenderness: There is no abdominal tenderness. Genitourinary:     Comments: deferred   Musculoskeletal:      Cervical back: Normal range of motion. No torticollis. No pain with movement. Right lower leg: No edema. Left lower leg: No edema. Lymphadenopathy:      Cervical: No cervical adenopathy. Skin:     General: Skin is warm and dry. Coloration: Skin is not ashen, cyanotic, jaundiced or pale. Neurological:      General: No focal deficit present. Mental Status: He is alert and oriented to person, place, and time. Motor: Motor function is intact. Gait: Gait is intact. Psychiatric:         Attention and Perception: Attention and perception normal.         Mood and Affect: Mood and affect normal.         Speech: Speech normal.         Behavior: Behavior normal. Behavior is cooperative. Thought Content: Thought content normal. Thought content does not include suicidal ideation. Thought content does not include suicidal plan. Cognition and Memory: Cognition and memory normal.         Judgment: Judgment normal.         Assessment/Plan:  1. Essential hypertension  -     losartan-hydroCHLOROthiazide (HYZAAR) 100-25 MG per tablet; Take 1 tablet by mouth daily, Disp-90 tablet, R-1Pt need apptNormal  -     CBC; Future  -     PTH, Intact; Future  -     carvedilol (COREG) 12.5 MG tablet; Take 1 tablet by mouth 2 times daily, Disp-180 tablet, R-1Normal  -     External Referral To Cardiology  2. Coronary artery disease involving native coronary artery of native heart without angina pectoris  -     External Referral To Cardiology  3. Presence of drug coated stent in right coronary artery  -     External Referral To Cardiology  4. Hypertriglyceridemia  -     Lipid Panel; Future  -     External Referral To Cardiology  5. Smoker  6. Anxiety  -     sertraline (ZOLOFT) 25 MG tablet; Take 1 tablet by mouth daily, Disp-90 tablet, R-1Normal  -     TSH with Reflex; Future  7. Lumbar disc herniation with radiculopathy  8. Prediabetes  -     Comprehensive Metabolic Panel, Fasting; Future  -     Hemoglobin A1C; Future  -     Insulin, total; Future  -     Urinalysis Reflex to Culture; Future  9. Screening for prostate cancer  -     PSA screening; Future    Referral to cardiology  Patient needs cardiac testing including echocardiogram, EKG, carotid Dopplers, echocardiogram.  Beta-blocker changed to Coreg twice a day, may need to increase based on blood pressure response  Patient would also benefit from repeat sleep study to ensure titration in device appropriate. Discussed smoking cessation    An electronic signature was used to authenticate this note. --Lisy Zuñiga, APRN - CNP   Please note that this chart was generated using voice recognition Dragon dictation software. Although every effort was made to ensure the accuracy of this automated transcription, some errors in transcription may have occurred.

## 2021-09-22 ENCOUNTER — HOSPITAL ENCOUNTER (OUTPATIENT)
Age: 51
Discharge: HOME | End: 2021-09-22
Payer: COMMERCIAL

## 2021-09-22 DIAGNOSIS — I10 ESSENTIAL HYPERTENSION: ICD-10-CM

## 2021-09-22 DIAGNOSIS — R73.03 PREDIABETES: ICD-10-CM

## 2021-09-22 DIAGNOSIS — Z12.5 SCREENING FOR PROSTATE CANCER: ICD-10-CM

## 2021-09-22 DIAGNOSIS — E78.1 HYPERTRIGLYCERIDEMIA: ICD-10-CM

## 2021-09-22 DIAGNOSIS — F41.9 ANXIETY: ICD-10-CM

## 2021-09-22 LAB
-: ABNORMAL
ALBUMIN SERPL-MCNC: 4.7 G/DL (ref 3.5–5.2)
ALBUMIN/GLOBULIN RATIO: 1.8 (ref 1–2.5)
ALP BLD-CCNC: 47 U/L (ref 40–129)
ALT SERPL-CCNC: 21 U/L (ref 5–41)
AMORPHOUS: ABNORMAL
ANION GAP SERPL CALCULATED.3IONS-SCNC: 13 MMOL/L (ref 9–17)
AST SERPL-CCNC: 26 U/L
BACTERIA: ABNORMAL
BILIRUB SERPL-MCNC: 0.66 MG/DL (ref 0.3–1.2)
BILIRUBIN URINE: NEGATIVE
BUN BLDV-MCNC: 13 MG/DL (ref 6–20)
BUN/CREAT BLD: 18 (ref 9–20)
CALCIUM SERPL-MCNC: 9.5 MG/DL (ref 8.6–10.4)
CASTS UA: ABNORMAL /LPF
CHLORIDE BLD-SCNC: 98 MMOL/L (ref 98–107)
CHOLESTEROL/HDL RATIO: 2.2
CHOLESTEROL: 164 MG/DL
CO2: 25 MMOL/L (ref 20–31)
COLOR: YELLOW
COMMENT UA: ABNORMAL
CREAT SERPL-MCNC: 0.72 MG/DL (ref 0.7–1.2)
CRYSTALS, UA: ABNORMAL /HPF
EPITHELIAL CELLS UA: ABNORMAL /HPF (ref 0–5)
GFR AFRICAN AMERICAN: >60 ML/MIN
GFR NON-AFRICAN AMERICAN: >60 ML/MIN
GFR SERPL CREATININE-BSD FRML MDRD: ABNORMAL ML/MIN/{1.73_M2}
GFR SERPL CREATININE-BSD FRML MDRD: ABNORMAL ML/MIN/{1.73_M2}
GLUCOSE FASTING: 147 MG/DL (ref 70–99)
GLUCOSE URINE: NEGATIVE
HCT VFR BLD CALC: 43.7 % (ref 40.7–50.3)
HDLC SERPL-MCNC: 76 MG/DL
HEMOGLOBIN: 14.6 G/DL (ref 13–17)
INSULIN COMMENT: NORMAL
INSULIN REFERENCE RANGE:: NORMAL
INSULIN: 10.6 MU/L
KETONES, URINE: NEGATIVE
LDL CHOLESTEROL: 59 MG/DL (ref 0–130)
LEUKOCYTE ESTERASE, URINE: NEGATIVE
MCH RBC QN AUTO: 33.3 PG (ref 25.2–33.5)
MCHC RBC AUTO-ENTMCNC: 33.4 G/DL (ref 28.4–34.8)
MCV RBC AUTO: 99.8 FL (ref 82.6–102.9)
MUCUS: ABNORMAL
NITRITE, URINE: NEGATIVE
NRBC AUTOMATED: 0 PER 100 WBC
OTHER OBSERVATIONS UA: ABNORMAL
PDW BLD-RTO: 11.8 % (ref 11.8–14.4)
PH UA: 6 (ref 5–9)
PLATELET # BLD: 184 K/UL (ref 138–453)
PMV BLD AUTO: 10.5 FL (ref 8.1–13.5)
POTASSIUM SERPL-SCNC: 3.7 MMOL/L (ref 3.7–5.3)
PROSTATE SPECIFIC ANTIGEN: 0.25 UG/L
PROTEIN UA: NEGATIVE
PTH INTACT: 42.72 PG/ML (ref 15–65)
RBC # BLD: 4.38 M/UL (ref 4.21–5.77)
RBC UA: ABNORMAL /HPF (ref 0–2)
RENAL EPITHELIAL, UA: ABNORMAL /HPF
SODIUM BLD-SCNC: 136 MMOL/L (ref 135–144)
SPECIFIC GRAVITY UA: 1.02 (ref 1.01–1.02)
TOTAL PROTEIN: 7.3 G/DL (ref 6.4–8.3)
TRICHOMONAS: ABNORMAL
TRIGL SERPL-MCNC: 145 MG/DL
TSH SERPL DL<=0.05 MIU/L-ACNC: 1.67 MIU/L (ref 0.3–5)
TURBIDITY: CLEAR
URINE HGB: ABNORMAL
UROBILINOGEN, URINE: NORMAL
VLDLC SERPL CALC-MCNC: NORMAL MG/DL (ref 1–30)
WBC # BLD: 7 K/UL (ref 3.5–11.3)
WBC UA: ABNORMAL /HPF (ref 0–5)
YEAST: ABNORMAL

## 2021-09-22 PROCEDURE — G0103 PSA SCREENING: HCPCS

## 2021-09-22 PROCEDURE — 83525 ASSAY OF INSULIN: CPT

## 2021-09-22 PROCEDURE — 80061 LIPID PANEL: CPT

## 2021-09-22 PROCEDURE — 85027 COMPLETE CBC AUTOMATED: CPT

## 2021-09-22 PROCEDURE — 81001 URINALYSIS AUTO W/SCOPE: CPT

## 2021-09-22 PROCEDURE — 84443 ASSAY THYROID STIM HORMONE: CPT

## 2021-09-22 PROCEDURE — 80053 COMPREHEN METABOLIC PANEL: CPT

## 2021-09-22 PROCEDURE — 36415 COLL VENOUS BLD VENIPUNCTURE: CPT

## 2021-09-22 PROCEDURE — 83036 HEMOGLOBIN GLYCOSYLATED A1C: CPT

## 2021-09-22 PROCEDURE — 83970 ASSAY OF PARATHORMONE: CPT

## 2021-09-23 LAB
ESTIMATED AVERAGE GLUCOSE: 126 MG/DL
HBA1C MFR BLD: 6 % (ref 4–6)

## 2021-09-24 DIAGNOSIS — R31.21 ASYMPTOMATIC MICROSCOPIC HEMATURIA: Primary | ICD-10-CM

## 2021-10-23 ENCOUNTER — APPOINTMENT (OUTPATIENT)
Dept: CT IMAGING | Age: 51
End: 2021-10-23
Payer: COMMERCIAL

## 2021-10-23 ENCOUNTER — HOSPITAL ENCOUNTER (INPATIENT)
Age: 51
LOS: 2 days | Discharge: HOME | DRG: 683 | End: 2021-10-25
Attending: EMERGENCY MEDICINE | Admitting: INTERNAL MEDICINE
Payer: COMMERCIAL

## 2021-10-23 ENCOUNTER — HOSPITAL ENCOUNTER (EMERGENCY)
Age: 51
Discharge: ANOTHER HOSPITAL | End: 2021-10-23
Attending: EMERGENCY MEDICINE
Payer: COMMERCIAL

## 2021-10-23 VITALS
TEMPERATURE: 96 F | SYSTOLIC BLOOD PRESSURE: 82 MMHG | DIASTOLIC BLOOD PRESSURE: 36 MMHG | OXYGEN SATURATION: 97 % | RESPIRATION RATE: 15 BRPM | HEART RATE: 72 BPM

## 2021-10-23 DIAGNOSIS — R55 SYNCOPE AND COLLAPSE: Primary | ICD-10-CM

## 2021-10-23 DIAGNOSIS — I10 ESSENTIAL HYPERTENSION: ICD-10-CM

## 2021-10-23 DIAGNOSIS — N17.9 AKI (ACUTE KIDNEY INJURY) (HCC): ICD-10-CM

## 2021-10-23 DIAGNOSIS — R07.89 OTHER CHEST PAIN: ICD-10-CM

## 2021-10-23 PROBLEM — F10.10 ALCOHOL ABUSE: Status: ACTIVE | Noted: 2021-10-23

## 2021-10-23 PROBLEM — I95.9 HYPOTENSIVE EPISODE: Status: ACTIVE | Noted: 2021-10-23

## 2021-10-23 PROBLEM — Z72.0 TOBACCO ABUSE: Status: ACTIVE | Noted: 2017-07-01

## 2021-10-23 LAB
ABSOLUTE EOS #: 0.04 K/UL (ref 0–0.44)
ABSOLUTE IMMATURE GRANULOCYTE: <0.03 K/UL (ref 0–0.3)
ABSOLUTE LYMPH #: 2.18 K/UL (ref 1.1–3.7)
ABSOLUTE MONO #: 0.35 K/UL (ref 0.1–1.2)
ALBUMIN SERPL-MCNC: 4.2 G/DL (ref 3.5–5.2)
ALBUMIN/GLOBULIN RATIO: 1.4 (ref 1–2.5)
ALP BLD-CCNC: 48 U/L (ref 40–129)
ALT SERPL-CCNC: 17 U/L (ref 5–41)
ANION GAP SERPL CALCULATED.3IONS-SCNC: 15 MMOL/L (ref 9–17)
ANION GAP SERPL CALCULATED.3IONS-SCNC: 19 MMOL/L (ref 9–17)
AST SERPL-CCNC: 17 U/L
BASOPHILS # BLD: 1 % (ref 0–2)
BASOPHILS ABSOLUTE: 0.07 K/UL (ref 0–0.2)
BILIRUB SERPL-MCNC: 0.32 MG/DL (ref 0.3–1.2)
BUN BLDV-MCNC: 23 MG/DL (ref 6–20)
BUN BLDV-MCNC: 27 MG/DL (ref 6–20)
BUN/CREAT BLD: 11 (ref 9–20)
BUN/CREAT BLD: ABNORMAL (ref 9–20)
CALCIUM SERPL-MCNC: 8.7 MG/DL (ref 8.6–10.4)
CALCIUM SERPL-MCNC: 9.2 MG/DL (ref 8.6–10.4)
CHLORIDE BLD-SCNC: 100 MMOL/L (ref 98–107)
CHLORIDE BLD-SCNC: 99 MMOL/L (ref 98–107)
CO2: 18 MMOL/L (ref 20–31)
CO2: 19 MMOL/L (ref 20–31)
CREAT SERPL-MCNC: 1.28 MG/DL (ref 0.7–1.2)
CREAT SERPL-MCNC: 2.53 MG/DL (ref 0.7–1.2)
DIFFERENTIAL TYPE: NORMAL
EKG ATRIAL RATE: 69 BPM
EKG ATRIAL RATE: 91 BPM
EKG P AXIS: -6 DEGREES
EKG P AXIS: 64 DEGREES
EKG P-R INTERVAL: 148 MS
EKG P-R INTERVAL: 172 MS
EKG Q-T INTERVAL: 356 MS
EKG Q-T INTERVAL: 404 MS
EKG QRS DURATION: 88 MS
EKG QRS DURATION: 88 MS
EKG QTC CALCULATION (BAZETT): 432 MS
EKG QTC CALCULATION (BAZETT): 437 MS
EKG R AXIS: -6 DEGREES
EKG R AXIS: 59 DEGREES
EKG T AXIS: -3 DEGREES
EKG T AXIS: 59 DEGREES
EKG VENTRICULAR RATE: 69 BPM
EKG VENTRICULAR RATE: 91 BPM
EOSINOPHILS RELATIVE PERCENT: 1 % (ref 1–4)
GFR AFRICAN AMERICAN: 33 ML/MIN
GFR AFRICAN AMERICAN: >60 ML/MIN
GFR NON-AFRICAN AMERICAN: 27 ML/MIN
GFR NON-AFRICAN AMERICAN: 60 ML/MIN
GFR SERPL CREATININE-BSD FRML MDRD: ABNORMAL ML/MIN/{1.73_M2}
GLUCOSE BLD-MCNC: 143 MG/DL (ref 74–100)
GLUCOSE BLD-MCNC: 161 MG/DL (ref 70–99)
GLUCOSE BLD-MCNC: 163 MG/DL (ref 70–99)
HCT VFR BLD CALC: 43.1 % (ref 40.7–50.3)
HCT VFR BLD CALC: 43.9 % (ref 40.7–50.3)
HEMOGLOBIN: 14.2 G/DL (ref 13–17)
HEMOGLOBIN: 14.9 G/DL (ref 13–17)
IMMATURE GRANULOCYTES: 0 %
INR BLD: 1.1
LYMPHOCYTES # BLD: 38 % (ref 24–43)
MCH RBC QN AUTO: 33.2 PG (ref 25.2–33.5)
MCH RBC QN AUTO: 33.3 PG (ref 25.2–33.5)
MCHC RBC AUTO-ENTMCNC: 32.9 G/DL (ref 28.4–34.8)
MCHC RBC AUTO-ENTMCNC: 33.9 G/DL (ref 28.4–34.8)
MCV RBC AUTO: 100.7 FL (ref 82.6–102.9)
MCV RBC AUTO: 98 FL (ref 82.6–102.9)
MONOCYTES # BLD: 6 % (ref 3–12)
MYOGLOBIN: 55 NG/ML (ref 28–72)
NRBC AUTOMATED: 0 PER 100 WBC
NRBC AUTOMATED: 0 PER 100 WBC
PARTIAL THROMBOPLASTIN TIME: 26.6 SEC (ref 23.9–33.8)
PDW BLD-RTO: 12.2 % (ref 11.8–14.4)
PDW BLD-RTO: 12.3 % (ref 11.8–14.4)
PLATELET # BLD: 210 K/UL (ref 138–453)
PLATELET # BLD: 221 K/UL (ref 138–453)
PLATELET ESTIMATE: NORMAL
PMV BLD AUTO: 10.2 FL (ref 8.1–13.5)
PMV BLD AUTO: 10.2 FL (ref 8.1–13.5)
POTASSIUM SERPL-SCNC: 4.2 MMOL/L (ref 3.7–5.3)
POTASSIUM SERPL-SCNC: 5 MMOL/L (ref 3.7–5.3)
PROTHROMBIN TIME: 14 SEC (ref 11.5–14.2)
RBC # BLD: 4.28 M/UL (ref 4.21–5.77)
RBC # BLD: 4.48 M/UL (ref 4.21–5.77)
RBC # BLD: NORMAL 10*6/UL
SARS-COV-2, RAPID: NOT DETECTED
SEG NEUTROPHILS: 54 % (ref 36–65)
SEGMENTED NEUTROPHILS ABSOLUTE COUNT: 3.08 K/UL (ref 1.5–8.1)
SODIUM BLD-SCNC: 132 MMOL/L (ref 135–144)
SODIUM BLD-SCNC: 138 MMOL/L (ref 135–144)
SPECIMEN DESCRIPTION: NORMAL
TOTAL CK: 70 U/L (ref 39–308)
TOTAL PROTEIN: 7.1 G/DL (ref 6.4–8.3)
TROPONIN INTERP: NORMAL
TROPONIN T: NORMAL NG/ML
TROPONIN, HIGH SENSITIVITY: 10 NG/L (ref 0–22)
TROPONIN, HIGH SENSITIVITY: 12 NG/L (ref 0–22)
TROPONIN, HIGH SENSITIVITY: 8 NG/L (ref 0–22)
TROPONIN, HIGH SENSITIVITY: 9 NG/L (ref 0–22)
WBC # BLD: 5.7 K/UL (ref 3.5–11.3)
WBC # BLD: 7.9 K/UL (ref 3.5–11.3)
WBC # BLD: NORMAL 10*3/UL

## 2021-10-23 PROCEDURE — 99285 EMERGENCY DEPT VISIT HI MDM: CPT

## 2021-10-23 PROCEDURE — 80048 BASIC METABOLIC PNL TOTAL CA: CPT

## 2021-10-23 PROCEDURE — 87635 SARS-COV-2 COVID-19 AMP PRB: CPT

## 2021-10-23 PROCEDURE — 83874 ASSAY OF MYOGLOBIN: CPT

## 2021-10-23 PROCEDURE — 70498 CT ANGIOGRAPHY NECK: CPT

## 2021-10-23 PROCEDURE — 82947 ASSAY GLUCOSE BLOOD QUANT: CPT

## 2021-10-23 PROCEDURE — 2580000003 HC RX 258: Performed by: EMERGENCY MEDICINE

## 2021-10-23 PROCEDURE — 6360000002 HC RX W HCPCS: Performed by: EMERGENCY MEDICINE

## 2021-10-23 PROCEDURE — 80053 COMPREHEN METABOLIC PANEL: CPT

## 2021-10-23 PROCEDURE — 82550 ASSAY OF CK (CPK): CPT

## 2021-10-23 PROCEDURE — 36415 COLL VENOUS BLD VENIPUNCTURE: CPT

## 2021-10-23 PROCEDURE — 2580000003 HC RX 258

## 2021-10-23 PROCEDURE — 99283 EMERGENCY DEPT VISIT LOW MDM: CPT

## 2021-10-23 PROCEDURE — 93005 ELECTROCARDIOGRAM TRACING: CPT | Performed by: EMERGENCY MEDICINE

## 2021-10-23 PROCEDURE — 6360000002 HC RX W HCPCS

## 2021-10-23 PROCEDURE — 6360000004 HC RX CONTRAST MEDICATION: Performed by: EMERGENCY MEDICINE

## 2021-10-23 PROCEDURE — 84484 ASSAY OF TROPONIN QUANT: CPT

## 2021-10-23 PROCEDURE — 70450 CT HEAD/BRAIN W/O DYE: CPT

## 2021-10-23 PROCEDURE — 2060000000 HC ICU INTERMEDIATE R&B

## 2021-10-23 PROCEDURE — 93010 ELECTROCARDIOGRAM REPORT: CPT | Performed by: FAMILY MEDICINE

## 2021-10-23 PROCEDURE — 85730 THROMBOPLASTIN TIME PARTIAL: CPT

## 2021-10-23 PROCEDURE — 85025 COMPLETE CBC W/AUTO DIFF WBC: CPT

## 2021-10-23 PROCEDURE — 85610 PROTHROMBIN TIME: CPT

## 2021-10-23 PROCEDURE — 85027 COMPLETE CBC AUTOMATED: CPT

## 2021-10-23 PROCEDURE — 6370000000 HC RX 637 (ALT 250 FOR IP)

## 2021-10-23 PROCEDURE — 71260 CT THORAX DX C+: CPT

## 2021-10-23 RX ORDER — ACETAMINOPHEN 650 MG/1
650 SUPPOSITORY RECTAL EVERY 6 HOURS PRN
Status: DISCONTINUED | OUTPATIENT
Start: 2021-10-23 | End: 2021-10-25 | Stop reason: HOSPADM

## 2021-10-23 RX ORDER — SODIUM CHLORIDE 9 MG/ML
25 INJECTION, SOLUTION INTRAVENOUS PRN
Status: DISCONTINUED | OUTPATIENT
Start: 2021-10-23 | End: 2021-10-25 | Stop reason: HOSPADM

## 2021-10-23 RX ORDER — POLYETHYLENE GLYCOL 3350 17 G/17G
17 POWDER, FOR SOLUTION ORAL DAILY PRN
Status: DISCONTINUED | OUTPATIENT
Start: 2021-10-23 | End: 2021-10-25 | Stop reason: HOSPADM

## 2021-10-23 RX ORDER — ASPIRIN 81 MG/1
81 TABLET ORAL DAILY
Status: DISCONTINUED | OUTPATIENT
Start: 2021-10-23 | End: 2021-10-25 | Stop reason: HOSPADM

## 2021-10-23 RX ORDER — SODIUM CHLORIDE 9 MG/ML
INJECTION, SOLUTION INTRAVENOUS CONTINUOUS
Status: DISCONTINUED | OUTPATIENT
Start: 2021-10-23 | End: 2021-10-25 | Stop reason: HOSPADM

## 2021-10-23 RX ORDER — ONDANSETRON 2 MG/ML
4 INJECTION INTRAMUSCULAR; INTRAVENOUS EVERY 6 HOURS PRN
Status: DISCONTINUED | OUTPATIENT
Start: 2021-10-23 | End: 2021-10-25 | Stop reason: HOSPADM

## 2021-10-23 RX ORDER — SODIUM CHLORIDE 0.9 % (FLUSH) 0.9 %
5-40 SYRINGE (ML) INJECTION PRN
Status: DISCONTINUED | OUTPATIENT
Start: 2021-10-23 | End: 2021-10-25 | Stop reason: HOSPADM

## 2021-10-23 RX ORDER — SODIUM CHLORIDE 0.9 % (FLUSH) 0.9 %
5-40 SYRINGE (ML) INJECTION EVERY 12 HOURS SCHEDULED
Status: DISCONTINUED | OUTPATIENT
Start: 2021-10-23 | End: 2021-10-25 | Stop reason: HOSPADM

## 2021-10-23 RX ORDER — CARVEDILOL 12.5 MG/1
12.5 TABLET ORAL 2 TIMES DAILY
Status: DISCONTINUED | OUTPATIENT
Start: 2021-10-23 | End: 2021-10-24

## 2021-10-23 RX ORDER — ACETAMINOPHEN 325 MG/1
650 TABLET ORAL EVERY 4 HOURS PRN
Status: DISCONTINUED | OUTPATIENT
Start: 2021-10-23 | End: 2021-10-25 | Stop reason: HOSPADM

## 2021-10-23 RX ORDER — HEPARIN SODIUM 5000 [USP'U]/ML
5000 INJECTION, SOLUTION INTRAVENOUS; SUBCUTANEOUS EVERY 8 HOURS SCHEDULED
Status: DISCONTINUED | OUTPATIENT
Start: 2021-10-23 | End: 2021-10-25 | Stop reason: HOSPADM

## 2021-10-23 RX ORDER — ALBUTEROL SULFATE 90 UG/1
2 AEROSOL, METERED RESPIRATORY (INHALATION) EVERY 4 HOURS PRN
Status: DISCONTINUED | OUTPATIENT
Start: 2021-10-23 | End: 2021-10-25 | Stop reason: HOSPADM

## 2021-10-23 RX ORDER — LANSOPRAZOLE
30 KIT DAILY
Status: DISCONTINUED | OUTPATIENT
Start: 2021-10-23 | End: 2021-10-25 | Stop reason: HOSPADM

## 2021-10-23 RX ORDER — ACETAMINOPHEN 325 MG/1
650 TABLET ORAL EVERY 6 HOURS PRN
Status: DISCONTINUED | OUTPATIENT
Start: 2021-10-23 | End: 2021-10-25 | Stop reason: HOSPADM

## 2021-10-23 RX ORDER — 0.9 % SODIUM CHLORIDE 0.9 %
1000 INTRAVENOUS SOLUTION INTRAVENOUS ONCE
Status: COMPLETED | OUTPATIENT
Start: 2021-10-23 | End: 2021-10-24

## 2021-10-23 RX ORDER — ONDANSETRON 4 MG/1
4 TABLET, ORALLY DISINTEGRATING ORAL EVERY 8 HOURS PRN
Status: DISCONTINUED | OUTPATIENT
Start: 2021-10-23 | End: 2021-10-25 | Stop reason: HOSPADM

## 2021-10-23 RX ORDER — CLOPIDOGREL BISULFATE 75 MG/1
75 TABLET ORAL DAILY
Status: DISCONTINUED | OUTPATIENT
Start: 2021-10-23 | End: 2021-10-25 | Stop reason: HOSPADM

## 2021-10-23 RX ORDER — ALBUTEROL SULFATE 2.5 MG/3ML
2.5 SOLUTION RESPIRATORY (INHALATION) EVERY 4 HOURS PRN
Status: DISCONTINUED | OUTPATIENT
Start: 2021-10-23 | End: 2021-10-25 | Stop reason: HOSPADM

## 2021-10-23 RX ADMIN — SODIUM CHLORIDE 1000 ML: 9 INJECTION, SOLUTION INTRAVENOUS at 14:45

## 2021-10-23 RX ADMIN — Medication 30 MG: at 19:48

## 2021-10-23 RX ADMIN — Medication 81 MG: at 19:47

## 2021-10-23 RX ADMIN — CLOPIDOGREL 75 MG: 75 TABLET, FILM COATED ORAL at 19:48

## 2021-10-23 RX ADMIN — SODIUM CHLORIDE, PRESERVATIVE FREE 10 ML: 5 INJECTION INTRAVENOUS at 21:32

## 2021-10-23 RX ADMIN — ENOXAPARIN SODIUM 40 MG: 40 INJECTION SUBCUTANEOUS at 16:12

## 2021-10-23 RX ADMIN — Medication 81 MG: at 19:45

## 2021-10-23 RX ADMIN — HEPARIN SODIUM 5000 UNITS: 5000 INJECTION INTRAVENOUS; SUBCUTANEOUS at 21:31

## 2021-10-23 RX ADMIN — IOPAMIDOL 150 ML: 755 INJECTION, SOLUTION INTRAVENOUS at 13:31

## 2021-10-23 ASSESSMENT — ENCOUNTER SYMPTOMS
CHEST TIGHTNESS: 0
CONSTIPATION: 0
ABDOMINAL DISTENTION: 0
APNEA: 0
COLOR CHANGE: 0
SHORTNESS OF BREATH: 0
NAUSEA: 1
VOMITING: 0
DIARRHEA: 0
NAUSEA: 0
COUGH: 0
ABDOMINAL PAIN: 0
CHOKING: 0

## 2021-10-23 ASSESSMENT — PAIN SCALES - GENERAL
PAINLEVEL_OUTOF10: 0
PAINLEVEL_OUTOF10: 0

## 2021-10-23 NOTE — ED NOTES
Bed: 12  Expected date:   Expected time:   Means of arrival:   Comments:  mac Villalobos RN  10/23/21 [id number]
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
## 2021-10-23 NOTE — ED NOTES
The following labs labeled with pt sticker and tubed to lab:     [x] Blue     [x] Lavender   [] on ice  [x] Green/yellow  [x] Green/black [] on ice  [x] Yellow  [] Red  [] Pink      [] COVID-19 swab    [] Rapid  [] PCR  [] Peds Viral Panel     [] Urine Sample  [] Pelvic Cultures  [] Blood Cultures            Khurram Estrada RN  10/23/21 9765

## 2021-10-23 NOTE — ED NOTES
Report received from Ricarda Sanders. This caregiver met patient, resting quietly, no new change in status.        Joselin Sierra, RN  10/23/21 5554
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
## 2021-10-23 NOTE — H&P
cough, choking, chest tightness and shortness of breath. Cardiovascular: Negative for chest pain, palpitations and leg swelling. Gastrointestinal: Positive for nausea. Negative for abdominal distention, abdominal pain, constipation and diarrhea. Genitourinary: Negative for difficulty urinating, dysuria, flank pain and frequency. Neurological: Negative for seizures, speech difficulty, weakness and light-headedness. Psychiatric/Behavioral: Negative for agitation, behavioral problems and confusion. PAST MEDICAL HISTORY     Past Medical History:   Diagnosis Date    Arthritis     CAD (coronary artery disease)     COPD (chronic obstructive pulmonary disease) ([facility] [address]     Disc disorder     GERD (gastroesophageal reflux disease)     Hypertension     Hypertriglyceridemia     COLEMAN (obstructive sleep apnea)     DOES NOT USE HIS CPAP MACHINE AS DIRECTED    Pancreatitis 2013    Normal Ultrasound GB 2013       PAST SURGICAL HISTORY     Past Surgical History:   Procedure Laterality Date    CORONARY ANGIOPLASTY WITH STENT PLACEMENT  2004    Stent x 3 RCA    CORONARY ANGIOPLASTY WITH STENT PLACEMENT  03/27/2015    CORINA RCA   stents  total    DIAGNOSTIC CARDIAC CATH LAB PROCEDURE      HERNIA REPAIR  7545    umbilical    KNEE ARTHROSCOPY      right knee for ACL repair    KNEE ARTHROSCOPY Left 08/18/2016    chondroplasty , open excision guerrier cyst - Dr. Vinay Carballo Left 08/18/2016    Medial and lateral - Dr. Ivory Villalobos     Chantix [varenicline], Lipitor [atorvastatin], and Livalo [pitavastatin]    MEDICATIONS PRIOR TO ADMISSION     Prior to Admission medications    Medication Sig Start Date End Date Taking?  Authorizing Provider   sertraline (ZOLOFT) 25 MG tablet Take 1 tablet by mouth daily 8/26/21 8/26/22  GILMA Covarrubias - CNP   losartan-hydroCHLOROthiazide (HYZAAR) 100-25 MG per tablet Take 1 tablet by mouth daily 8/26/21   GILMA Covarrubias - CNP   carvedilol (COREG) 12.5 MG tablet Take 1 tablet by mouth 2 times daily 21  GILMA Montilla CNP   clopidogrel (PLAVIX) 75 MG tablet Take 1 tablet by mouth daily 21  GILMA Montilla CNP   albuterol (PROVENTIL) (2.5 MG/3ML) 0.083% nebulizer solution Take 3 mLs by nebulization every 4 hours as needed for Wheezing (dispense nebules) 21  GILMA Montilla CNP   albuterol sulfate HFA (PROVENTIL HFA) 108 (90 Base) MCG/ACT inhaler Inhale 2 puffs into the lungs every 4 hours as needed for Wheezing Generic albuterol is ok. 21  GILMA Montilla CNP   Respiratory Therapy Supplies (NEBULIZER COMPRESSOR) KIT 1 kit by Does not apply route once for 1 dose 18  Azzie Bloch, APRN - CNP   Multiple Vitamins-Minerals (CENTRUM ADULTS PO) Take 1 tablet by mouth daily    Historical Provider, MD   aspirin EC 81 MG EC tablet Take 1 tablet by mouth daily 17   Celestina Side, MD   esomeprazole Magnesium (NEXIUM) 40 MG PACK Take 40 mg by mouth daily. Historical Provider, MD       SOCIAL HISTORY     Tobacco: 1 1/2 PPD for last 30 years  Alcohol: 5-6 drinks every other day  Illicits: Denies  Occupation:     FAMILY HISTORY     Family History   Problem Relation Age of Onset    Heart Disease Mother     Cancer Maternal Aunt        PHYSICAL EXAM     Vitals: BP 99/64   Pulse 65   Temp 97.6 °F (36.4 °C)   Resp 16   Ht 6' (1.829 m)   Wt 215 lb (97.5 kg)   SpO2 98%   BMI 29.16 kg/m²   Tmax: Temp (24hrs), Av.8 °F (36 °C), Min:96 °F (35.6 °C), Max:97.6 °F (36.4 °C)    Last Body weight:   Wt Readings from Last 3 Encounters:   10/23/21 215 lb (97.5 kg)   21 217 lb (98.4 kg)   11/26/19 237 lb (107.5 kg)     Body Mass Index : Body mass index is 29.16 kg/m². Physical Exam  Constitutional:       General: He is not in acute distress. Appearance: He is not ill-appearing. Cardiovascular:      Heart sounds: No murmur heard. No friction rub. No gallop. Pulmonary:      Effort: No respiratory distress. Breath sounds: No stridor. No wheezing or rhonchi. Abdominal:      General: There is no distension. Tenderness: There is no abdominal tenderness. There is no guarding. Musculoskeletal:         General: No swelling, tenderness, deformity or signs of injury. Neurological:      Cranial Nerves: No cranial nerve deficit. Sensory: No sensory deficit. Motor: No weakness.        INVESTIGATIONS     Laboratory Testing:     Recent Results (from the past 24 hour(s))   EKG 12 Lead    Collection Time: 10/23/21 12:57 PM   Result Value Ref Range    Ventricular Rate 91 BPM    Atrial Rate 91 BPM    P-R Interval 148 ms    QRS Duration 88 ms    Q-T Interval 356 ms    QTc Calculation (Bazett) 437 ms    P Axis -6 degrees    R Axis -6 degrees    T Axis -3 degrees   CBC Auto Differential    Collection Time: 10/23/21  1:00 PM   Result Value Ref Range    WBC 5.7 3.5 - 11.3 k/uL    RBC 4.48 4.21 - 5.77 m/uL    Hemoglobin 14.9 13.0 - 17.0 g/dL    Hematocrit 43.9 40.7 - 50.3 %    MCV 98.0 82.6 - 102.9 fL    MCH 33.3 25.2 - 33.5 pg    MCHC 33.9 28.4 - 34.8 g/dL    RDW 12.2 11.8 - 14.4 %    Platelets 742 973 - 956 k/uL    MPV 10.2 8.1 - 13.5 fL    NRBC Automated 0.0 0.0 per 100 WBC    Differential Type NOT REPORTED     Seg Neutrophils 54 36 - 65 %    Lymphocytes 38 24 - 43 %    Monocytes 6 3 - 12 %    Eosinophils % 1 1 - 4 %    Basophils 1 0 - 2 %    Immature Granulocytes 0 0 %    Segs Absolute 3.08 1.50 - 8.10 k/uL    Absolute Lymph # 2.18 1.10 - 3.70 k/uL    Absolute Mono # 0.35 0.10 - 1.20 k/uL    Absolute Eos # 0.04 0.00 - 0.44 k/uL    Basophils Absolute 0.07 0.00 - 0.20 k/uL    Absolute Immature Granulocyte <0.03 0.00 - 0.30 k/uL    WBC Morphology NOT REPORTED     RBC Morphology NOT REPORTED     Platelet Estimate NOT REPORTED    Comprehensive Metabolic Panel w/ Reflex to MG    Collection Time: 10/23/21  1:00 PM   Result Value Ref Range    Glucose 163 (H) 70 - 99 mg/dL    BUN 27 (H) 6 - 20 mg/dL    CREATININE 2.53 (H) 0.70 - 1.20 mg/dL    Bun/Cre Ratio 11 9 - 20    Calcium 9.2 8.6 - 10.4 mg/dL    Sodium 138 135 - 144 mmol/L    Potassium 5.0 3.7 - 5.3 mmol/L    Chloride 100 98 - 107 mmol/L    CO2 19 (L) 20 - 31 mmol/L    Anion Gap 19 (H) 9 - 17 mmol/L    Alkaline Phosphatase 48 40 - 129 U/L    ALT 17 5 - 41 U/L    AST 17 <40 U/L    Total Bilirubin 0.32 0.3 - 1.2 mg/dL    Total Protein 7.1 6.4 - 8.3 g/dL    Albumin 4.2 3.5 - 5.2 g/dL    Albumin/Globulin Ratio 1.4 1.0 - 2.5    GFR Non-African American 27 (L) >60 mL/min    GFR  33 (L) >60 mL/min    GFR Comment          GFR Staging         Troponin    Collection Time: 10/23/21  1:00 PM   Result Value Ref Range    Troponin, High Sensitivity 12 0 - 22 ng/L    Troponin T NOT REPORTED <0.03 ng/mL    Troponin Interp NOT REPORTED    APTT    Collection Time: 10/23/21  1:00 PM   Result Value Ref Range    PTT 26.6 23.9 - 33.8 sec   Protime-INR    Collection Time: 10/23/21  1:00 PM   Result Value Ref Range    Protime 14.0 11.5 - 14.2 sec    INR 1.1    Glucose, Whole Blood    Collection Time: 10/23/21  1:28 PM   Result Value Ref Range    POC Glucose 143 (H) 74 - 100 mg/dL   Troponin    Collection Time: 10/23/21  3:01 PM   Result Value Ref Range    Troponin, High Sensitivity 10 0 - 22 ng/L    Troponin T NOT REPORTED <0.03 ng/mL    Troponin Interp NOT REPORTED    Myoglobin, Serum    Collection Time: 10/23/21  3:01 PM   Result Value Ref Range    Myoglobin 55 28 - 72 ng/mL       Imaging:   CT Head WO Contrast    Result Date: 10/23/2021  No acute intracranial abnormality. No acute territorial infarction, intracranial hemorrhage or mass lesion. CTA HEAD NECK W CONTRAST    Result Date: 10/23/2021  No hemodynamically significant stenosis within the head and neck vasculature. No aneurysm. CT CHEST ABDOMEN PELVIS W CONTRAST    Result Date: 10/23/2021  1. No evidence for thoracic or abdominal aorta aneurysm.  2. No evidence for

## 2021-10-23 NOTE — ED NOTES
Bed: 17  Expected date:   Expected time:   Means of arrival:   Comments:  RENARD Matthews RN  10/23/21 [id number]
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
## 2021-10-23 NOTE — ED NOTES
Report called to Anum Browne RN, all questions answered. COVID swab being sent to lab prior to transport. Transport by [facility].      Brooklyn Griffin LPN  59/02/87 [id number]

## 2021-10-23 NOTE — ED NOTES
[facility] Access called to initiate transfer to [facility].  Call connected with Dr Samaria More and Dr Bala Perkins  10/23/21 7458

## 2021-10-24 PROBLEM — E87.1 HYPONATREMIA: Status: ACTIVE | Noted: 2021-10-24

## 2021-10-24 LAB
ANION GAP SERPL CALCULATED.3IONS-SCNC: 16 MMOL/L (ref 9–17)
BUN BLDV-MCNC: 23 MG/DL (ref 6–20)
BUN/CREAT BLD: ABNORMAL (ref 9–20)
CALCIUM SERPL-MCNC: 8.6 MG/DL (ref 8.6–10.4)
CHLORIDE BLD-SCNC: 102 MMOL/L (ref 98–107)
CO2: 17 MMOL/L (ref 20–31)
CREAT SERPL-MCNC: 1.27 MG/DL (ref 0.7–1.2)
GFR AFRICAN AMERICAN: >60 ML/MIN
GFR NON-AFRICAN AMERICAN: >60 ML/MIN
GFR SERPL CREATININE-BSD FRML MDRD: ABNORMAL ML/MIN/{1.73_M2}
GFR SERPL CREATININE-BSD FRML MDRD: ABNORMAL ML/MIN/{1.73_M2}
GLUCOSE BLD-MCNC: 111 MG/DL (ref 75–110)
GLUCOSE BLD-MCNC: 121 MG/DL (ref 75–110)
GLUCOSE BLD-MCNC: 153 MG/DL (ref 75–110)
GLUCOSE BLD-MCNC: 252 MG/DL (ref 75–110)
GLUCOSE BLD-MCNC: 95 MG/DL (ref 70–99)
POTASSIUM SERPL-SCNC: 4.1 MMOL/L (ref 3.7–5.3)
SODIUM BLD-SCNC: 135 MMOL/L (ref 135–144)
TROPONIN INTERP: NORMAL
TROPONIN T: NORMAL NG/ML
TROPONIN, HIGH SENSITIVITY: 9 NG/L (ref 0–22)

## 2021-10-24 PROCEDURE — 36415 COLL VENOUS BLD VENIPUNCTURE: CPT

## 2021-10-24 PROCEDURE — 2580000003 HC RX 258: Performed by: STUDENT IN AN ORGANIZED HEALTH CARE EDUCATION/TRAINING PROGRAM

## 2021-10-24 PROCEDURE — 2580000003 HC RX 258: Performed by: EMERGENCY MEDICINE

## 2021-10-24 PROCEDURE — 99223 1ST HOSP IP/OBS HIGH 75: CPT | Performed by: INTERNAL MEDICINE

## 2021-10-24 PROCEDURE — 84484 ASSAY OF TROPONIN QUANT: CPT

## 2021-10-24 PROCEDURE — 6370000000 HC RX 637 (ALT 250 FOR IP): Performed by: STUDENT IN AN ORGANIZED HEALTH CARE EDUCATION/TRAINING PROGRAM

## 2021-10-24 PROCEDURE — 80048 BASIC METABOLIC PNL TOTAL CA: CPT

## 2021-10-24 PROCEDURE — 2060000000 HC ICU INTERMEDIATE R&B

## 2021-10-24 PROCEDURE — 6360000002 HC RX W HCPCS

## 2021-10-24 PROCEDURE — 82947 ASSAY GLUCOSE BLOOD QUANT: CPT

## 2021-10-24 PROCEDURE — 94761 N-INVAS EAR/PLS OXIMETRY MLT: CPT

## 2021-10-24 PROCEDURE — 2580000003 HC RX 258

## 2021-10-24 PROCEDURE — 6370000000 HC RX 637 (ALT 250 FOR IP)

## 2021-10-24 RX ORDER — NICOTINE POLACRILEX 4 MG
15 LOZENGE BUCCAL PRN
Status: DISCONTINUED | OUTPATIENT
Start: 2021-10-24 | End: 2021-10-25 | Stop reason: HOSPADM

## 2021-10-24 RX ORDER — DEXTROSE MONOHYDRATE 25 G/50ML
12.5 INJECTION, SOLUTION INTRAVENOUS PRN
Status: DISCONTINUED | OUTPATIENT
Start: 2021-10-24 | End: 2021-10-25 | Stop reason: HOSPADM

## 2021-10-24 RX ORDER — DEXTROSE MONOHYDRATE 50 MG/ML
100 INJECTION, SOLUTION INTRAVENOUS PRN
Status: DISCONTINUED | OUTPATIENT
Start: 2021-10-24 | End: 2021-10-25 | Stop reason: HOSPADM

## 2021-10-24 RX ORDER — CARVEDILOL 25 MG/1
25 TABLET ORAL 2 TIMES DAILY
Status: DISCONTINUED | OUTPATIENT
Start: 2021-10-24 | End: 2021-10-25 | Stop reason: HOSPADM

## 2021-10-24 RX ADMIN — Medication 81 MG: at 08:04

## 2021-10-24 RX ADMIN — CLOPIDOGREL 75 MG: 75 TABLET, FILM COATED ORAL at 08:04

## 2021-10-24 RX ADMIN — SODIUM CHLORIDE, PRESERVATIVE FREE 10 ML: 5 INJECTION INTRAVENOUS at 01:24

## 2021-10-24 RX ADMIN — SODIUM CHLORIDE 1000 ML: 9 INJECTION, SOLUTION INTRAVENOUS at 01:26

## 2021-10-24 RX ADMIN — SODIUM CHLORIDE, PRESERVATIVE FREE 10 ML: 5 INJECTION INTRAVENOUS at 21:09

## 2021-10-24 RX ADMIN — Medication 30 MG: at 08:04

## 2021-10-24 RX ADMIN — CARVEDILOL 25 MG: 25 TABLET, FILM COATED ORAL at 21:15

## 2021-10-24 RX ADMIN — SODIUM CHLORIDE, PRESERVATIVE FREE 10 ML: 5 INJECTION INTRAVENOUS at 08:05

## 2021-10-24 RX ADMIN — SODIUM CHLORIDE, PRESERVATIVE FREE 10 ML: 5 INJECTION INTRAVENOUS at 08:04

## 2021-10-24 RX ADMIN — SODIUM CHLORIDE: 9 INJECTION, SOLUTION INTRAVENOUS at 09:11

## 2021-10-24 RX ADMIN — SODIUM CHLORIDE: 9 INJECTION, SOLUTION INTRAVENOUS at 02:45

## 2021-10-24 RX ADMIN — INSULIN LISPRO 1 UNITS: 100 INJECTION, SOLUTION INTRAVENOUS; SUBCUTANEOUS at 18:56

## 2021-10-24 RX ADMIN — SODIUM CHLORIDE: 9 INJECTION, SOLUTION INTRAVENOUS at 21:05

## 2021-10-24 RX ADMIN — HEPARIN SODIUM 5000 UNITS: 5000 INJECTION INTRAVENOUS; SUBCUTANEOUS at 14:46

## 2021-10-24 RX ADMIN — HEPARIN SODIUM 5000 UNITS: 5000 INJECTION INTRAVENOUS; SUBCUTANEOUS at 21:15

## 2021-10-24 RX ADMIN — HEPARIN SODIUM 5000 UNITS: 5000 INJECTION INTRAVENOUS; SUBCUTANEOUS at 05:32

## 2021-10-24 ASSESSMENT — PAIN SCALES - GENERAL
PAINLEVEL_OUTOF10: 0
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
## 2021-10-24 NOTE — FLOWSHEET NOTE
Orthostatic BP readings:      10/24/21 0850 10/24/21 0852 10/24/21 0854   Vitals   Pulse 62 82 84   Heart Rate Source Monitor  --   --    BP (!) 164/97 (!) 164/119 (!) 161/118   BP Location Left upper arm Left upper arm Left upper arm   BP Upper/Lower Upper Upper Upper   BP Method Automatic Automatic Automatic   Patient Position Supine Sitting Standing   Orthostatic B/P and Pulse?  Yes Yes Yes   Cardiac Rhythm Sinus rhythm Sinus rhythm Sinus rhythm

## 2021-10-24 NOTE — PLAN OF CARE
Pt remained syncope free. Used call light when needing to use wash room. Teaching on importance of letting help know not to ambulate without assistance. Cardiac rate and rhythm optimal >70 bpm upon walking or standing without bradycardiac episodes. Pt rested throughout the night.

## 2021-10-24 NOTE — CARE COORDINATION
Case Management Initial Discharge Plan  Rajan Stovall,             Met with:patient to discuss discharge plans. Information verified: address, contacts, phone number, , insurance Yes  Insurance Provider: [address] [place] [address]    Emergency Contact/Next of Kin name & number: wife [phone]  Who are involved in patient's support system? wife    PCP: Bianka Funes, GILMA - CNP  Date of last visit: month ago      Discharge Planning    Living Arrangements:  Spouse/Significant Other, Children     Home has one story    Patient able to perform ADL's:Independent    Current Services (outpatient & in home) none  DME equipment: none  DME provider: n/a    Is patient receiving oral anticoagulation therapy? No          Potential Assistance Needed:  N/A    Patient agreeable to home care: No  [place] of choice provided:  n/a    Prior SNF/Rehab Placement and Facility: N/A  Agreeable to SNF/Rehab: No  [place] of choice provided: n/a     Evaluation: n/a    Expected Discharge date:  10/26/21    Patient expects to be discharged to:    home    If home: is the family and/or caregiver wiling & able to provide support at home? yes  Who will be providing this support? wife*    Follow Up Appointment: Best Day/ Time:      Transportation provider: wife  Transportation arrangements needed for discharge: No    Readmission Risk              Risk of Unplanned Readmission:  15             Does patient have a readmission risk score greater than 14?: Yes  If yes, follow-up appointment must be made within 7 days of discharge.      Goals of Care: safety, no chest pain      Educated patient on transitional options, provided freedom of choice and are agreeable with plan      Discharge Plan: home with family    [facility] [place]          Electronically signed by Marvie Mcardle, RN on 10/24/21 at 10:30 AM EDT

## 2021-10-24 NOTE — PLAN OF CARE
Problem: Falls - Risk of:  Goal: Will remain free from falls  Description: Will remain free from falls  10/24/2021 0742 by Alejandrina Charles RN  Outcome: Ongoing  10/24/2021 0732 by Nehemias Ponce RN  Outcome: Ongoing  Goal: Absence of physical injury  Description: Absence of physical injury  10/24/2021 0742 by Alejandrina Charles RN  Outcome: Ongoing  10/24/2021 0732 by Nehemias Ponce RN  Outcome: Ongoing     Problem: Cardiac:  Goal: Ability to maintain vital signs within normal range will improve  Description: Ability to maintain vital signs within normal range will improve  10/24/2021 0742 by Alejandrina Charles RN  Outcome: Ongoing  10/24/2021 0732 by Nehemias Ponce RN  Outcome: Ongoing  Goal: Cardiovascular alteration will improve  Description: Cardiovascular alteration will improve  10/24/2021 0742 by Alejandrina Charles RN  Outcome: Ongoing  10/24/2021 0732 by Nehemias Ponce RN  Outcome: Ongoing

## 2021-10-24 NOTE — PROGRESS NOTES
Physical Therapy        Physical Therapy Cancel Note      DATE: 10/24/2021    NAME: Danis Rockwell  MRN: [id number]   : 1970      Patient not seen this date for Physical Therapy due to:    Patient independent with functional mobility. Will defer PT evaluation at this time. Please reorder PT if future needs arise.        Electronically signed by Ludy Pickard PT on 10/24/2021 at 1:49 PM

## 2021-10-24 NOTE — PROGRESS NOTES
episodes in the past.  The wife reports BP of 49/29 checked by EMS.    Patient had some air hunger but denies any chest pain, fever, diarrhea, vomiting, headache, abdominal pain, urinary or fecal incontinence or tongue bite.     On arrival to ED patient BP is 99/64, alert and oriented. IV NS 1 L bolus given.     On my evaluation patient is alert and oriented, vitally stable, with no acute complaints. Rest of system examination is unremarkable. Pertinent labs are creatinine 2.53, , troponin 12>10>8, CT head without contrast, CT chest abdomen pelvis and CTA head neck with contrast-unremarkable.       OBJECTIVE     Vital Signs:  BP (!) 161/118   Pulse 84   Temp 98 °F (36.7 °C) (Oral)   Resp 18   Ht 6' (1.829 m)   Wt 215 lb (97.5 kg)   SpO2 98%   BMI 29.16 kg/m²     Temp (24hrs), Av.9 °F (36.6 °C), Min:96 °F (35.6 °C), Max:98.8 °F (37.1 °C)    In: 220   Out: 200 [Urine:200]    Physical Exam:  Constitutional: This is a well developed, well nourished, 54y.o. year old male who is alert, oriented, cooperative and in no apparent distress. Head:normocephalic and atraumatic. EENT:  PERRLA. No conjunctival injections. Septum was midline, mucosa was without erythema, exudates or cobblestoning. No thrush was noted. Neck: Supple without thyromegaly. No elevated JVP. Trachea was midline. Respiratory: Chest was symmetrical without dullness to percussion. Breath sounds bilaterally were clear to auscultation. There were no wheezes, rhonchi or rales. There is no intercostal retraction or use of accessory muscles. No egophony noted. Cardiovascular: Regular without murmur, clicks, gallops or rubs. Abdomen: Slightly rounded and soft without organomegaly. No rebound, rigidity or guarding was appreciated. Lymphatic: No lymphadenopathy. Musculoskeletal: Normal curvature of the spine. No gross muscle weakness.     Extremities:  No lower extremity edema, ulcerations, tenderness, varicosities or erythema. Muscle size, tone and strength are normal.  No involuntary movements are noted. Skin:  Warm and dry. Good color, turgor and pigmentation. No lesions or scars. No cyanosis or clubbing  Neurological/Psychiatric: The patient's general behavior, level of consciousness, thought content and emotional status is normal.        Medications:  Scheduled Medications:    insulin lispro  0-6 Units SubCUTAneous TID WC    insulin lispro  0-3 Units SubCUTAneous Nightly    sodium chloride flush  5-40 mL IntraVENous 2 times per day    heparin (porcine)  5,000 Units SubCUTAneous 3 times per day    aspirin EC  81 mg Oral Daily    [Held by provider] carvedilol  12.5 mg Oral BID    clopidogrel  75 mg Oral Daily    lansoprazole  30 mg Oral Daily    sodium chloride flush  5-40 mL IntraVENous 2 times per day     Continuous Infusions:    dextrose      sodium chloride      sodium chloride      sodium chloride 100 mL/hr at 10/24/21 0911     PRN Medicationsglucose, 15 g, PRN  dextrose, 12.5 g, PRN  glucagon (rDNA), 1 mg, PRN  dextrose, 100 mL/hr, PRN  sodium chloride flush, 5-40 mL, PRN  sodium chloride, 25 mL, PRN  acetaminophen, 650 mg, Q4H PRN  ondansetron, 4 mg, Q8H PRN   Or  ondansetron, 4 mg, Q6H PRN  albuterol, 2.5 mg, Q4H PRN  albuterol sulfate HFA, 2 puff, Q4H PRN  sodium chloride flush, 5-40 mL, PRN  sodium chloride, 25 mL, PRN  polyethylene glycol, 17 g, Daily PRN  acetaminophen, 650 mg, Q6H PRN   Or  acetaminophen, 650 mg, Q6H PRN        Diagnostic Labs:  CBC:   Recent Labs     10/23/21  1300 10/23/21  1856   WBC 5.7 7.9   RBC 4.48 4.28   HGB 14.9 14.2   HCT 43.9 43.1   MCV 98.0 100.7   RDW 12.2 12.3    210     BMP:   Recent Labs     10/23/21  1300 10/23/21  2206 10/24/21  0005    132* 135   K 5.0 4.2 4.1    99 102   CO2 19* 18* 17*   BUN 27* 23* 23*   CREATININE 2.53* 1.28* 1.27*     BNP: No results for input(s): BNP in the last 72 hours.   PT/INR:   Recent Labs     10/23/21  1300 PROTIME 14.0   INR 1.1     APTT:   Recent Labs     10/23/21  1300   APTT 26.6     CARDIAC ENZYMES: No results for input(s): CKMB, CKMBINDEX, TROPONINI in the last 72 hours. Invalid input(s): CKTOTAL;3  FASTING LIPID PANEL:  Lab Results   Component Value Date    CHOL 164 09/22/2021    HDL 76 09/22/2021    TRIG 145 09/22/2021     LIVER PROFILE:   Recent Labs     10/23/21  1300   AST 17   ALT 17   BILITOT 0.32   ALKPHOS 48      MICROBIOLOGY:   Lab Results   Component Value Date/Time    CULTURE NO GROWTH 5 DAYS 04/28/2014 09:55 PM    CULTURE  04/28/2014 09:55 PM     Performed at [facility] [place], [place] [address]    CULTURE  ([phone] 04/28/2014 09:55 PM       Imaging:    CT Head WO Contrast    Result Date: 10/23/2021  No acute intracranial abnormality. No acute territorial infarction, intracranial hemorrhage or mass lesion. CTA HEAD NECK W CONTRAST    Result Date: 10/23/2021  No hemodynamically significant stenosis within the head and neck vasculature. No aneurysm. CT CHEST ABDOMEN PELVIS W CONTRAST    Result Date: 10/23/2021  1. No evidence for thoracic or abdominal aorta aneurysm. 2. No evidence for pneumonia. 3. No acute infective or inflammatory process in the abdomen or pelvis. 4. Left renal cyst unchanged. ASSESSMENT & PLAN     1. Syncopal episode. Reported to be hypotensive initially, currently normotensive. Asymptomatic. Continue IV fluids. Trop flat. RBS normal.   Cardiology recommending echocardiogram, stress test likely on Monday and Holter monitoring on discharge  2. GITA. prerenal, due to hypotension. Creatinine trending down ,continue IV fluids. Monitor creatinine  3. Hypertension-continue home dose of Coreg  4. CAD, multivessel s/p stents x2, 2004 and 3/2015 CORINA to RCA. Continue aspirin Plavix. Consult cardiology  5. Tobacco abuse: Cessation counseling  6. Alcohol abuse: Counseled regarding cessation  7.  GERD: Continue lansoprazole 30 daily     DVT ppx: Heparin  GI ppx: Lansoprazole     PT/OT/SW: Following  Discharge Planning: CM to assist with     Kenya Ortiz MD  Internal Medicine Resident, PGY-1  [address], [place]  10/24/2021, 9:43 AM

## 2021-10-25 ENCOUNTER — APPOINTMENT (OUTPATIENT)
Dept: NUCLEAR MEDICINE | Age: 51
DRG: 683 | End: 2021-10-25
Payer: COMMERCIAL

## 2021-10-25 VITALS
OXYGEN SATURATION: 96 % | RESPIRATION RATE: 12 BRPM | BODY MASS INDEX: 29.08 KG/M2 | WEIGHT: 214.73 LBS | HEART RATE: 57 BPM | TEMPERATURE: 98.6 F | SYSTOLIC BLOOD PRESSURE: 144 MMHG | DIASTOLIC BLOOD PRESSURE: 92 MMHG | HEIGHT: 72 IN

## 2021-10-25 LAB
ANION GAP SERPL CALCULATED.3IONS-SCNC: 9 MMOL/L (ref 9–17)
BUN BLDV-MCNC: 12 MG/DL (ref 6–20)
BUN/CREAT BLD: ABNORMAL (ref 9–20)
CALCIUM SERPL-MCNC: 8.7 MG/DL (ref 8.6–10.4)
CHLORIDE BLD-SCNC: 101 MMOL/L (ref 98–107)
CO2: 20 MMOL/L (ref 20–31)
CREAT SERPL-MCNC: 0.68 MG/DL (ref 0.7–1.2)
EKG ATRIAL RATE: 65 BPM
EKG P AXIS: 53 DEGREES
EKG P-R INTERVAL: 172 MS
EKG Q-T INTERVAL: 410 MS
EKG QRS DURATION: 88 MS
EKG QTC CALCULATION (BAZETT): 426 MS
EKG R AXIS: 30 DEGREES
EKG T AXIS: 25 DEGREES
EKG VENTRICULAR RATE: 65 BPM
GFR AFRICAN AMERICAN: >60 ML/MIN
GFR NON-AFRICAN AMERICAN: >60 ML/MIN
GFR SERPL CREATININE-BSD FRML MDRD: ABNORMAL ML/MIN/{1.73_M2}
GFR SERPL CREATININE-BSD FRML MDRD: ABNORMAL ML/MIN/{1.73_M2}
GLUCOSE BLD-MCNC: 111 MG/DL (ref 70–99)
GLUCOSE BLD-MCNC: 157 MG/DL (ref 75–110)
LV EF: 54 %
LV EF: 56 %
LVEF MODALITY: NORMAL
LVEF MODALITY: NORMAL
POTASSIUM SERPL-SCNC: 4 MMOL/L (ref 3.7–5.3)
SODIUM BLD-SCNC: 130 MMOL/L (ref 135–144)

## 2021-10-25 PROCEDURE — 6370000000 HC RX 637 (ALT 250 FOR IP): Performed by: STUDENT IN AN ORGANIZED HEALTH CARE EDUCATION/TRAINING PROGRAM

## 2021-10-25 PROCEDURE — 6360000002 HC RX W HCPCS: Performed by: STUDENT IN AN ORGANIZED HEALTH CARE EDUCATION/TRAINING PROGRAM

## 2021-10-25 PROCEDURE — 93017 CV STRESS TEST TRACING ONLY: CPT

## 2021-10-25 PROCEDURE — 78452 HT MUSCLE IMAGE SPECT MULT: CPT

## 2021-10-25 PROCEDURE — 6370000000 HC RX 637 (ALT 250 FOR IP)

## 2021-10-25 PROCEDURE — 93306 TTE W/DOPPLER COMPLETE: CPT

## 2021-10-25 PROCEDURE — 80048 BASIC METABOLIC PNL TOTAL CA: CPT

## 2021-10-25 PROCEDURE — 93010 ELECTROCARDIOGRAM REPORT: CPT | Performed by: INTERNAL MEDICINE

## 2021-10-25 PROCEDURE — 3430000000 HC RX DIAGNOSTIC RADIOPHARMACEUTICAL: Performed by: STUDENT IN AN ORGANIZED HEALTH CARE EDUCATION/TRAINING PROGRAM

## 2021-10-25 PROCEDURE — A9500 TC99M SESTAMIBI: HCPCS | Performed by: STUDENT IN AN ORGANIZED HEALTH CARE EDUCATION/TRAINING PROGRAM

## 2021-10-25 PROCEDURE — 99233 SBSQ HOSP IP/OBS HIGH 50: CPT | Performed by: INTERNAL MEDICINE

## 2021-10-25 PROCEDURE — 93226 XTRNL ECG REC<48 HR SCAN A/R: CPT

## 2021-10-25 PROCEDURE — 6360000002 HC RX W HCPCS

## 2021-10-25 PROCEDURE — 36415 COLL VENOUS BLD VENIPUNCTURE: CPT

## 2021-10-25 PROCEDURE — 82947 ASSAY GLUCOSE BLOOD QUANT: CPT

## 2021-10-25 PROCEDURE — 93225 XTRNL ECG REC<48 HRS REC: CPT

## 2021-10-25 PROCEDURE — 2580000003 HC RX 258: Performed by: STUDENT IN AN ORGANIZED HEALTH CARE EDUCATION/TRAINING PROGRAM

## 2021-10-25 PROCEDURE — 94761 N-INVAS EAR/PLS OXIMETRY MLT: CPT

## 2021-10-25 PROCEDURE — 2580000003 HC RX 258

## 2021-10-25 RX ORDER — CARVEDILOL 25 MG/1
25 TABLET ORAL 2 TIMES DAILY
Qty: 60 TABLET | Refills: 3 | Status: CANCELLED | OUTPATIENT
Start: 2021-10-25

## 2021-10-25 RX ORDER — AMINOPHYLLINE DIHYDRATE 25 MG/ML
50 INJECTION, SOLUTION INTRAVENOUS PRN
Status: DISCONTINUED | OUTPATIENT
Start: 2021-10-25 | End: 2021-10-25

## 2021-10-25 RX ORDER — ALBUTEROL SULFATE 90 UG/1
2 AEROSOL, METERED RESPIRATORY (INHALATION) PRN
Status: DISCONTINUED | OUTPATIENT
Start: 2021-10-25 | End: 2021-10-25

## 2021-10-25 RX ORDER — ATROPINE SULFATE 0.1 MG/ML
0.5 INJECTION INTRAVENOUS EVERY 5 MIN PRN
Status: DISCONTINUED | OUTPATIENT
Start: 2021-10-25 | End: 2021-10-25

## 2021-10-25 RX ORDER — SODIUM CHLORIDE 9 MG/ML
500 INJECTION, SOLUTION INTRAVENOUS CONTINUOUS PRN
Status: DISCONTINUED | OUTPATIENT
Start: 2021-10-25 | End: 2021-10-25

## 2021-10-25 RX ORDER — NITROGLYCERIN 0.4 MG/1
0.4 TABLET SUBLINGUAL EVERY 5 MIN PRN
Status: DISCONTINUED | OUTPATIENT
Start: 2021-10-25 | End: 2021-10-25

## 2021-10-25 RX ORDER — ACETAMINOPHEN 500 MG
TABLET ORAL
Status: DISCONTINUED
Start: 2021-10-25 | End: 2021-10-25 | Stop reason: HOSPADM

## 2021-10-25 RX ORDER — SODIUM CHLORIDE 0.9 % (FLUSH) 0.9 %
5-40 SYRINGE (ML) INJECTION PRN
Status: DISCONTINUED | OUTPATIENT
Start: 2021-10-25 | End: 2021-10-25

## 2021-10-25 RX ORDER — METOPROLOL TARTRATE 5 MG/5ML
5 INJECTION INTRAVENOUS EVERY 5 MIN PRN
Status: DISCONTINUED | OUTPATIENT
Start: 2021-10-25 | End: 2021-10-25

## 2021-10-25 RX ADMIN — CARVEDILOL 25 MG: 25 TABLET, FILM COATED ORAL at 12:02

## 2021-10-25 RX ADMIN — SODIUM CHLORIDE, PRESERVATIVE FREE 10 ML: 5 INJECTION INTRAVENOUS at 09:30

## 2021-10-25 RX ADMIN — INSULIN LISPRO 1 UNITS: 100 INJECTION, SOLUTION INTRAVENOUS; SUBCUTANEOUS at 12:03

## 2021-10-25 RX ADMIN — TETRAKIS(2-METHOXYISOBUTYLISOCYANIDE)COPPER(I) TETRAFLUOROBORATE 34.4 MILLICURIE: 1 INJECTION, POWDER, LYOPHILIZED, FOR SOLUTION INTRAVENOUS at 10:15

## 2021-10-25 RX ADMIN — Medication 81 MG: at 12:02

## 2021-10-25 RX ADMIN — SODIUM CHLORIDE, PRESERVATIVE FREE 10 ML: 5 INJECTION INTRAVENOUS at 08:28

## 2021-10-25 RX ADMIN — REGADENOSON 0.4 MG: 0.08 INJECTION, SOLUTION INTRAVENOUS at 10:14

## 2021-10-25 RX ADMIN — Medication 30 MG: at 12:03

## 2021-10-25 RX ADMIN — HEPARIN SODIUM 5000 UNITS: 5000 INJECTION INTRAVENOUS; SUBCUTANEOUS at 08:25

## 2021-10-25 RX ADMIN — TETRAKIS(2-METHOXYISOBUTYLISOCYANIDE)COPPER(I) TETRAFLUOROBORATE 15.5 MILLICURIE: 1 INJECTION, POWDER, LYOPHILIZED, FOR SOLUTION INTRAVENOUS at 10:28

## 2021-10-25 RX ADMIN — CLOPIDOGREL 75 MG: 75 TABLET, FILM COATED ORAL at 12:03

## 2021-10-25 RX ADMIN — SODIUM CHLORIDE, PRESERVATIVE FREE 10 ML: 5 INJECTION INTRAVENOUS at 10:15

## 2021-10-25 ASSESSMENT — PAIN SCALES - GENERAL: PAINLEVEL_OUTOF10: 0

## 2021-10-25 NOTE — DISCHARGE INSTR - COC
Continuity of Care Form    Patient Name: Alvaro Jefferson   :  1970  MRN:  [id number]    [facility] date:  10/23/2021  Discharge date:  ***    Code Status Order: Full Code   Advance Directives:     Admitting Physician:  Margi Patel MD  PCP: Mehdi Bahena, APRN - CNP    Discharging Nurse: [facility] Unit/Room#: 1027/1027-01  Discharging Unit Phone Number: ***    Emergency Contact:   Extended Emergency Contact Information  Primary Emergency Contact: Luzma Jovel  Address: [address] [address] [place], [address] [address] [facility] Phone: [phone]  Relation: Spouse    Past Surgical History:  Past Surgical History:   Procedure Laterality Date    CORONARY ANGIOPLASTY WITH STENT PLACEMENT  2004    Stent x 3 RCA    CORONARY ANGIOPLASTY WITH STENT PLACEMENT  2015    CORINA RCA   stents  total    DIAGNOSTIC CARDIAC CATH LAB PROCEDURE      HERNIA REPAIR  0006    umbilical    KNEE ARTHROSCOPY      right knee for ACL repair    KNEE ARTHROSCOPY Left 2016    chondroplasty , open excision baker cyst - Dr. Judi Ball Left 2016    Medial and lateral - Dr. Oskar Esparza         Immunization History:   Immunization History   Administered Date(s) Administered    COVID-19, Riley Galeano, PF, 30mcg/0.3mL 2021, 10/15/2021    Influenza Virus Vaccine 2015    Influenza, Jules Aas, IM, (6 mo and older Fluzone, Flulaval, Fluarix and 3 yrs and older Afluria) 10/23/2017    Pneumococcal Polysaccharide ([id number]) 2013    Tdap (Boostrix, Adacel) 10/23/2017       Active Problems:  Patient Active Problem List   Diagnosis Code    Hypertriglyceridemia E78.1    Hypertension I10    GERD (gastroesophageal reflux disease) K21.9    CAD (coronary artery disease) I25.10    Presence of drug coated stent in right coronary artery Z95.5    S/P cardiac catheterization  3/27/2015 [facility] Z98.890    Lumbar disc herniation with radiculopathy M51.16    Impaired fasting blood sugar R73.01    Unstable angina ([facility]) I20.0    COPD (chronic obstructive pulmonary disease) ([facility]) J44.9    Precordial chest pain R07.2    Tobacco abuse Z72.0    Acute bronchitis J20.9    Anxiety F41.9    Acute pancreatitis from High Fat Meal K85.90    Statin intolerance Z78.9    Encounter for current long term use of antiplatelet drug I42.68    GITA (acute kidney injury) ([facility] [address] N17.9    Hypotensive episode I95.9    Alcohol abuse F10.10    Hyponatremia E87.1    Syncope and collapse R55       Isolation/Infection:   Isolation          No Isolation        Patient Infection Status     None to display          Nurse Assessment:  Last Vital Signs: BP (!) 144/92   Pulse 57   Temp 98.6 °F (37 °C) (Oral)   Resp 12   Ht 6' (1.829 m)   Wt 214 lb 11.7 oz (97.4 kg)   SpO2 96%   BMI 29.12 kg/m²     Last documented pain score (0-10 scale): Pain Level: 0  Last Weight:   Wt Readings from Last 1 Encounters:   10/25/21 214 lb 11.7 oz (97.4 kg)     Mental Status:  {IP PT MENTAL STATUS:20030}    IV Access:  { GAEL IV ACCESS:064434449}    Nursing Mobility/ADLs:  Walking   {P DME KHOD:872681655}  Transfer  {P DME ODHO:632997650}  Bathing  {CHP DME SZVS:795574452}  Dressing  {CHP DME ZMVA:326405410}  Toileting  {P DME WTUC:661532480}  Feeding  {[facility] DME UJMI:326989604}  Med Admin  {P DME PFMQ:645726150}  Med Delivery   { GAEL MED Delivery:474574114}    Wound Care Documentation and Therapy:  Incision 08/18/16 Knee Left (Active)   Number of days: 1894        Elimination:  Continence:   · Bowel: {YES / EN:44858}  · Bladder: {YES / YD:00367}  Urinary Catheter: {Urinary Catheter:647566917}   Colostomy/Ileostomy/Ileal Conduit: {YES / AW:03453}       Date of Last BM: ***    Intake/Output Summary (Last 24 hours) at 10/25/2021 1651  Last data filed at 10/25/2021 0522  Gross per 24 hour   Intake 2184 ml   Output --   Net 2184 ml     I/O last 3 completed shifts: In: 2184 [P.O.:350;  I.V.:1834]  Out: -     Safety Concerns:     Iggy Rehman GAEL Safety Concerns:519018232}    Impairments/Disabilities:      [address] Impairments/Disabilities:629306160}    Nutrition Therapy:  Current Nutrition Therapy:   [address] GAEL Diet List:236047140}    Routes of Feeding: {CHP DME Other Feedings:190550419}  Liquids: {Slp liquid thickness:63265}  Daily Fluid Restriction: {CHP DME Yes amt example:225918859}  Last Modified Barium Swallow with Video (Video Swallowing Test): {Done Not Done PRET:331692757}    Treatments at the Time of Hospital Discharge:   Respiratory Treatments: ***  Oxygen Therapy:  {Therapy; copd oxygen:73582}  Ventilator:    { CC Vent QROP:543177395}    Rehab Therapies: {THERAPEUTIC INTERVENTION:4389446376}  Weight Bearing Status/Restrictions: [address]  Weight Bearin}  Other Medical Equipment (for information only, NOT a DME order):  {EQUIPMENT:721263152}  Other Treatments: ***    Patient's personal belongings (please select all that are sent with patient):  {P DME Belongings:711501546}    RN SIGNATURE:  {Esignature:047849394}    CASE MANAGEMENT/SOCIAL WORK SECTION    Inpatient Status Date: ***    Readmission Risk Assessment Score:  Readmission Risk              Risk of Unplanned Readmission:  13           Discharging to Facility/ Agency   · Name:   · Address:  · Phone:  · Fax:    Dialysis Facility (if applicable)   · Name:  · Address:  · Dialysis Schedule:  · Phone:  · Fax:    / signature: {Esignature:518801228}    PHYSICIAN SECTION    Prognosis: {Prognosis:8315309198}    Condition at Discharge: [address] Patient Condition:781201309}    Rehab Potential (if transferring to Rehab): {Prognosis:3229574913}    Recommended Labs or Other Treatments After Discharge: ***    Physician Certification: I certify the above information and transfer of Kwasi Brantley  is necessary for the continuing treatment of the diagnosis listed and that he requires {Admit to Appropriate Level of Care:24828} for {GREATER/LESS:778036572} 30 days.      Update

## 2021-10-25 NOTE — PROGRESS NOTES
[facility]  Internal Medicine Teaching Residency Program  Inpatient Daily Progress Note  ______________________________________________________________________________    Patient: Kwasi Brantley  YOB: 1970   PPA:[id number]    Acct: [de-identified]     Room: [address] [place] [address]  Admit date: 10/23/2021  Today's date: 10/25/21  Number of days in the hospital: 2    SUBJECTIVE   Admitting Diagnosis: GITA (acute kidney injury) ([facility] [address]  CC:   Pt examined at bedside. Chart & results reviewed.    No acute issues over night  Vitals stable  No chest pain/syncopy since admission  Stress test : Negative  Echo: EF 54 %, mild left ventricular hypertrophy  GITA : resolved    ROS:  Constitutional:  negative for chills, fevers, sweats  Respiratory:  negative for cough, dyspnea on exertion, hemoptysis, shortness of breath, wheezing  Cardiovascular:  negative for chest pain, chest pressure/discomfort, lower extremity edema, palpitations  Gastrointestinal:  negative for abdominal pain, constipation, diarrhea, nausea, vomiting  Neurological:  negative for dizziness, headache  BRIEF HISTORY     This patient 54year-old patient is admitted for syncope and has GITA, prerenal.     The patient is a pleasant 51 y.o. male with PMH of hypertension, hypertriglyceridemia, impaired glucose tolerance, smoker for 30 years, COPD and CAD s/p cardiac cath, last 3/2015 presents with a chief complaint of of syncope.  The patient was driving when he experienced seeing everything white.  He pulled over to the side, had some chest discomfort and diaphoresis.  He passed out but was responsive to the sternal rub according to his wife.  There were multiple episodes of passing out on the way to the hospital. EMS was called and EKG was done which showed some concerns for STEMI however there were no changes in the EKG at hospital.  The patient denies any syncopal episodes in the past.  The wife reports BP of 49/29 checked by EMS.    Patient had some air hunger but denies any chest pain, fever, diarrhea, vomiting, headache, abdominal pain, urinary or fecal incontinence or tongue bite.     On arrival to ED patient BP is 99/64, alert and oriented.  IV NS 1 L bolus given.     On my evaluation patient is alert and oriented, vitally stable, with no acute complaints.  Rest of system examination is unremarkable. Pertinent labs are creatinine 2.53, , troponin 12>10>8, CT head without contrast, CT chest abdomen pelvis and CTA head neck with contrast-unremarkable. Bradly Hernandez is resolved. Echo shows EF of 54%, with LVH. Stress gaetano is negative. Holter monitoring for 48 hrs on outpatient basis per cardiology. Waiting for cardiology recommendation for discharge. OBJECTIVE     Vital Signs:  BP (!) 144/92   Pulse 64   Temp 98.6 °F (37 °C) (Oral)   Resp 18   Ht 6' (1.829 m)   Wt 214 lb 11.7 oz (97.4 kg)   SpO2 97%   BMI 29.12 kg/m²     Temp (24hrs), Av.4 °F (36.9 °C), Min:98 °F (36.7 °C), Max:98.6 °F (37 °C)    In: 2234   Out: -     Physical Exam:  Constitutional: This is a well developed, well nourished,  54y.o. year old male who is alert, oriented, cooperative and in no apparent distress. Head:normocephalic and atraumatic. EENT:  PERRLA. No conjunctival injections. Septum was midline, mucosa was without erythema, exudates or cobblestoning. No thrush was noted. Neck: Supple without thyromegaly. No elevated JVP. Trachea was midline. Respiratory: Chest was symmetrical without dullness to percussion. Breath sounds bilaterally were clear to auscultation. There were no wheezes, rhonchi or rales. There is no intercostal retraction or use of accessory muscles. No egophony noted. Cardiovascular: Regular without murmur, clicks, gallops or rubs. Abdomen: Slightly rounded and soft without organomegaly. No rebound, rigidity or guarding was appreciated. Lymphatic: No lymphadenopathy.   Musculoskeletal: Normal curvature of BNP: No results for input(s): BNP in the last 72 hours. PT/INR:   Recent Labs     10/23/21  1300   PROTIME 14.0   INR 1.1     APTT:   Recent Labs     10/23/21  1300   APTT 26.6     CARDIAC ENZYMES: No results for input(s): CKMB, CKMBINDEX, TROPONINI in the last 72 hours. Invalid input(s): CKTOTAL;3  FASTING LIPID PANEL:  Lab Results   Component Value Date    CHOL 164 09/22/2021    HDL 76 09/22/2021    TRIG 145 09/22/2021     LIVER PROFILE:   Recent Labs     10/23/21  1300   AST 17   ALT 17   BILITOT 0.32   ALKPHOS 48      MICROBIOLOGY:   Lab Results   Component Value Date/Time    CULTURE NO GROWTH 5 DAYS 04/28/2014 09:55 PM    CULTURE  04/28/2014 09:55 PM     Performed at [facility] [place], [place] [address]    CULTURE  ([phone] 04/28/2014 09:55 PM       Imaging:    CT Head WO Contrast    Result Date: 10/23/2021  No acute intracranial abnormality. No acute territorial infarction, intracranial hemorrhage or mass lesion. CTA HEAD NECK W CONTRAST    Result Date: 10/23/2021  No hemodynamically significant stenosis within the head and neck vasculature. No aneurysm. CT CHEST ABDOMEN PELVIS W CONTRAST    Result Date: 10/23/2021  1. No evidence for thoracic or abdominal aorta aneurysm. 2. No evidence for pneumonia. 3. No acute infective or inflammatory process in the abdomen or pelvis. 4. Left renal cyst unchanged. ASSESSMENT & PLAN     1. Syncopal episode. Asymptomatic currently. Echo shows EF of 54 % and stress test is negative. Holter monitoring for 48 hrs per cardio and OP follow up  2. GITA. Resolved  3. Hypertension-continue home dose of Coreg  4. CAD, multivessel s/p stents x2, 2004 and 3/2015 CORINA to RCA.  Continue aspirin Plavix. Follow cardio recommendation  5. Tobacco abuse: Cessation counseling  6. Alcohol abuse: Counseled regarding cessation  7.  GERD: Continue lansoprazole 30 daily     DVT ppx: Heparin  GI ppx: Lansoprazole     PT/OT/SW: Following  Discharge Planning:  to assist with     Josselyn Colunga MD  Internal Medicine Resident, PGY-1  [address], [place]  10/25/2021, 6:57 AM

## 2021-10-25 NOTE — PROGRESS NOTES
[facility])  Occupational Therapy Not Seen Note    DATE: 10/25/2021    NAME: Bong Antonio  MRN: [id number]   : 1970      Patient not seen this date for Occupational Therapy due to:    Per pt, Patient independent with ADLs and functional tasks with no acute OT needs. Will defer OT evaluation at this time. Please reorder OT if future needs arise.        Electronically signed by YNES Campa/L on 10/25/2021 at 8:42 AM

## 2021-10-25 NOTE — PLAN OF CARE
Problem: Falls - Risk of:  Goal: Will remain free from falls  Description: Will remain free from falls  10/25/2021 1621 by Brenna Goodwin RN  Outcome: Ongoing  10/25/2021 0523 by Aida Zambrano RN  Outcome: Ongoing  Fall precautions in place, hourly rounding    Problem: Cardiac:  Goal: Ability to maintain vital signs within normal range will improve  Description: Ability to maintain vital signs within normal range will improve  10/25/2021 1621 by Brenna Goodwin RN  Outcome: Ongoing  10/25/2021 0523 by Aida Zambrano RN  Outcome: Ongoing  Vital signs measured Q4H

## 2021-10-25 NOTE — FLOWSHEET NOTE
Holter Monitor was applied as ordered. Monitor is to be worn  for 48 hrs. Written and verbal instructions were given. Unit #250. Patient given  to return monitor via [employer].

## 2021-10-25 NOTE — FLOWSHEET NOTE
Pt education complete, all questions answered  Discharge paperwork signed  IVs removed  Pt ambulatory and walked to personal car with wife

## 2021-10-25 NOTE — PROGRESS NOTES
[facility]   Progress Note                   Date:   10/25/2021  Patient name: Darron Leventhal  Date of admission:  10/23/2021  2:12 PM  MRN:   [id number]  YOB: 1970  PCP: GILMA Randle CNP    Reason for Admission:      Subjective: There were no acute events overnight, remained hemodynamically stable, denies chest pain, dyspnea, orthopnea or palpitations. Medications:   Scheduled Meds:   insulin lispro  0-6 Units SubCUTAneous TID WC    insulin lispro  0-3 Units SubCUTAneous Nightly    carvedilol  25 mg Oral BID    sodium chloride flush  5-40 mL IntraVENous 2 times per day    heparin (porcine)  5,000 Units SubCUTAneous 3 times per day    aspirin EC  81 mg Oral Daily    clopidogrel  75 mg Oral Daily    lansoprazole  30 mg Oral Daily    sodium chloride flush  5-40 mL IntraVENous 2 times per day       Continuous Infusions:   dextrose      sodium chloride      sodium chloride      sodium chloride 100 mL/hr at 10/24/21 2105       CBC:   Recent Labs     10/23/21  1300 10/23/21  1856   WBC 5.7 7.9   HGB 14.9 14.2    210     BMP:    Recent Labs     10/23/21  2206 10/24/21  0005 10/25/21  0448   * 135 130*   K 4.2 4.1 4.0   CL 99 102 101   CO2 18* 17* 20   BUN 23* 23* 12   CREATININE 1.28* 1.27* 0.68*   GLUCOSE 161* 95 111*     Hepatic:   Recent Labs     10/23/21  1300   AST 17   ALT 17   BILITOT 0.32   ALKPHOS 48     Troponin: No results for input(s): TROPONINI in the last 72 hours. BNP: No results for input(s): BNP in the last 72 hours. Lipids: No results for input(s): CHOL, HDL in the last 72 hours.     Invalid input(s): LDLCALCU  INR:   Recent Labs     10/23/21  1300   INR 1.1       Objective:   Vitals: BP (!) 144/92   Pulse 64   Temp 98.6 °F (37 °C) (Oral)   Resp 18   Ht 6' (1.829 m)   Wt 214 lb 11.7 oz (97.4 kg)   SpO2 97%   BMI 29.12 kg/m²     General appearance: awake, alert, in no apparent respiratory distress   HEENT: Head: Normocephalic, no lesions, without obvious abnormality  Neck: no JVD  Lungs: clear to auscultation bilaterally, no basilar rales, no wheezing   Heart: regular rate and rhythm, S1, S2 normal, no murmur, click, rub or gallop  Abdomen: soft, non-tender; bowel sounds normal  Extremities: No LE edema  Neurologic: Mental status: Alert, oriented. Motor and sensory not done. Assessment / Acute Cardiac Problems:       IMPRESSION:    1. S/P Syncope likely secondary to dehydration, cannot rule out arrythmia due to extensive cardiac history. 2. CAD S/P CORINA to RCA in 2015, rest non obstructive CAD as above. 3. HTN/HLD. 4. Chronic Smoker. 5. DM.     RECOMMENDATIONS:    1. Plan for ECHO and Stress test today      Discussed with patient and nursing.        Justen Coburn MD MD  Fellow, [facility]   Pager - [phone]

## 2021-10-25 NOTE — PLAN OF CARE
Problem: Falls - Risk of:  Goal: Will remain free from falls  Description: Will remain free from falls  Outcome: Ongoing  Goal: Absence of physical injury  Description: Absence of physical injury  Outcome: Ongoing     Problem: Cardiac:  Goal: Ability to maintain vital signs within normal range will improve  Description: Ability to maintain vital signs within normal range will improve  Outcome: Ongoing  Goal: Cardiovascular alteration will improve  Description: Cardiovascular alteration will improve  Outcome: Ongoing

## 2021-10-25 NOTE — CARE COORDINATION
Met with patient, plan is home with wife, needs 48 hour Holter monitor.   Spoke with heart station, they will fit patient    Discharge [facility] Case Management Department  Written by: Dakotah Hancock RN    Patient Name: Za Vann  Attending Provider: Fran Chew MD  Admit Date: 10/23/2021  2:12 PM  MRN: [id number]  Account: [de-identified]                     : 1970  Discharge Date:  10/25/2021        Disposition: home    Dakotah Hancock RN

## 2021-10-25 NOTE — PROCEDURES
[address], [facility] 30                              CARDIAC STRESS TEST    PATIENT NAME: Melanie Friedman                     :        1970  MED REC NO:   [id number]                             ROOM:       8343  ACCOUNT NO:   [de-identified]                           ADMIT DATE: 10/23/2021  PROVIDER:     Radha Mckeon    DATE OF STUDY:  10/25/2021    ORDERING PROVIDER:  Jason Watkins MD  INTERPRETING PHYSICIAN:  MD Oscar Rosario STRESS STUDY:  Indication:  chest pain    Procedure was explained and consent was given    Medications:  Lexiscan, 0.4 mg  Resting heart rate:  60 bpm  Resting blood pressure:  153/105 mm/Hg  Infusion heart rate:  126 bpm  Infusion blood pressure: 141/81 mm/Hg  Peak blood pressure:  177/102 mm/Hg  Resting EKG:  Normal  Stress heart response:  Normal  Stress BP response:  Appropriate  Stress EKG(S):  Normal  Chest discomfort:  None  Ischemic EKG changes:  None    IMPRESSION:  Electrocardiographically negative Lexiscan stress study. Radio-isotope  results to follow from the department of Nuclear Medicine.         Gerald Maurice    D: 10/25/2021 12:49:26       T: 10/25/2021 13:42:24     AK/ANYA  Job#: [id number]     Doc#: Unknown    CC:    ()

## 2021-10-26 ENCOUNTER — TELEPHONE (OUTPATIENT)
Dept: FAMILY MEDICINE CLINIC | Age: 51
End: 2021-10-26

## 2021-10-26 NOTE — TELEPHONE ENCOUNTER
Patient admitted over weekend. Please call and follow through. Does he need hospital follow up? I see appointment already made with cardiology?

## 2021-10-27 NOTE — PROGRESS NOTES
[address]     Department of Internal Medicine - Staff Internal Medicine Teaching Service    INPATIENT DISCHARGE SUMMARY      Patient Identification:  Joshua Killian is a 54 y.o. male. :  1970  MRN: [id number]     Acct: [de-identified]   PCP: GILMA Stringer CNP  Admit Date:  10/23/2021  Discharge date and time: 10/25/2021  5:15 PM   Attending Provider: No att. providers found                                     [address] Problem Lists:  Principal Problem:    GITA (acute kidney injury) ([address]  Active Problems:    GERD (gastroesophageal reflux disease)    CAD (coronary artery disease)    Tobacco abuse    Hypotensive episode    Alcohol abuse    Hyponatremia    Syncope and collapse  Resolved Problems:    * No resolved hospital problems. *      HOSPITAL STAY     Brief Inpatient course:   Joshua Killian is a 54 y.o. male PMH of hypertension, smoking and CAD s/p cardiac cath who was admitted for the management of GITA (acute kidney injury) [facility]), presented to the emergency department with an episode of syncope. the patient was driving when he experienced seeing everything white, associated with chest discomfort and diaphoresis. patient denies any episode in the past.  Patient's wife reports BP of 49/29 checked by EMS before arrival, received IV fluids. On arrival to ED patient denied any chest pain and BP 99/64. His troponins are flat and EKG repeat showed no concerns for STEMI . cardiology is consulted. Echo showed EF of 54% with LVH and stress test is negative. GITA is present on arrival which has been resolved without complications with IVF. Patient is discharged on Holter monitoring OP for 48 hours  and advised follow-up with cardiology.       Procedures/ Significant Interventions:    Cardiac stress test,  echocardiography    Consults:     Consults:     Final Specialist Recommendations/Findings:   IP CONSULT TO CARDIOLOGY  IP CONSULT TO INTERNAL MEDICINE  IP CONSULT TO CASE MANAGEMENT      Any Hospital Acquired Infections: none    Discharge Functional Status:  stable    DISCHARGE PLAN     Disposition: home    Patient Instructions:   Discharge Medication List as of 10/25/2021  4:53 PM      CONTINUE these medications which have NOT CHANGED    Details   sertraline (ZOLOFT) 25 MG tablet Take 1 tablet by mouth daily, Disp-90 tablet, R-1Normal      losartan-hydroCHLOROthiazide (HYZAAR) 100-25 MG per tablet Take 1 tablet by mouth daily, Disp-90 tablet, R-1Pt need apptNormal      carvedilol (COREG) 12.5 MG tablet Take 1 tablet by mouth 2 times daily, Disp-180 tablet, R-1Normal      clopidogrel (PLAVIX) 75 MG tablet Take 1 tablet by mouth daily, Disp-90 tablet, R-0Normal      albuterol (PROVENTIL) (2.5 MG/3ML) 0.083% nebulizer solution Take 3 mLs by nebulization every 4 hours as needed for Wheezing (dispense nebules), Disp-50 vial, R-5Normal      albuterol sulfate HFA (PROVENTIL HFA) 108 (90 Base) MCG/ACT inhaler Inhale 2 puffs into the lungs every 4 hours as needed for Wheezing Generic albuterol is ok., Disp-1 Inhaler, R-5Normal      Respiratory Therapy Supplies (NEBULIZER COMPRESSOR) KIT ONCE Starting Thu 1/11/2018, Disp-1 kit, R-0, Print      Multiple Vitamins-Minerals (CENTRUM ADULTS PO) Take 1 tablet by mouth dailyHistorical Med      aspirin EC 81 MG EC tablet Take 1 tablet by mouth daily, Disp-30 tablet, R-3Normal      esomeprazole Magnesium (NEXIUM) 40 MG PACK Take 40 mg by mouth daily. Activity: activity as tolerated    Diet: regular diet    Follow-up:    Alex Parra MD  [facility]  [address] [place] [address]      Cardiology -- Tuesday, November 23 at 11:30 at [facility], APRN - [facility]  [address]  [phone]    In 2 weeks  Hospital follow up, admitted for syncope      Patient Instructions: Holter monitoring for 48 hours as recommended by cardiology.   Follow-up with cardiology and PCP  Follow up labs: none  Follow up imaging: none    Note that over 30 minutes was spent in preparing discharge papers, discussing discharge with patient, medication review, etc.      Everette Arzate MD,  Internal Medicine Resident, PGY-1  [facility];  [place], [place]  10/27/2021, 9:55 AM

## 2021-10-27 NOTE — TELEPHONE ENCOUNTER
[facility] Trans[facility] Initial Follow Up Call    Outreach made within 2 business days of discharge: Yes    Patient: Nohemi Brooks Patient : 1970   MRN: [id number]  Reason for Admission: Syncope, GITA, HTN  Discharge Date: 10/25/21       Spoke with: Patient     Discharge department/facility: [facility] ED to [facility]. 's ED    TCM Interactive Patient Contact:  Was patient able to fill all prescriptions: No new prescriptions given at discharge   Was patient instructed to bring all medications to the follow-up visit: No new medications prescribed  Is patient taking all medications as directed in the discharge summary? No new medications prescribed  Does patient understand their discharge instructions: Yes    Does patient have questions or concerns that need addressed prior to 7-14 day follow up office visit: BP is still high and fluctuating. Did advise to bring BP log to appt on 21 checking 3x daily with HR. Denies any headaches, chest pain or SOB. Mild lightheadedness/dizziness this morning- lasting 30 seconds. He is staying hydrated.  Today /95 HR 97. Appt with cardiology scheduled 11/15/21    Scheduled appointment with PCP within 7-14 days    Follow Up  Future Appointments   Date Time Provider Amparo Rowe   11/15/2021 11:20 AM [facility] Polk [facility]       Manan Dockery

## 2021-11-08 ENCOUNTER — OFFICE VISIT (OUTPATIENT)
Dept: FAMILY MEDICINE CLINIC | Age: 51
End: 2021-11-08
Payer: COMMERCIAL

## 2021-11-08 VITALS
BODY MASS INDEX: 29.73 KG/M2 | HEART RATE: 87 BPM | SYSTOLIC BLOOD PRESSURE: 138 MMHG | OXYGEN SATURATION: 97 % | RESPIRATION RATE: 18 BRPM | WEIGHT: 219.2 LBS | DIASTOLIC BLOOD PRESSURE: 92 MMHG | TEMPERATURE: 98.2 F

## 2021-11-08 DIAGNOSIS — I25.10 CORONARY ARTERY DISEASE INVOLVING NATIVE CORONARY ARTERY OF NATIVE HEART WITHOUT ANGINA PECTORIS: ICD-10-CM

## 2021-11-08 DIAGNOSIS — I10 PRIMARY HYPERTENSION: ICD-10-CM

## 2021-11-08 DIAGNOSIS — J44.9 CHRONIC OBSTRUCTIVE PULMONARY DISEASE, UNSPECIFIED COPD TYPE (HCC): ICD-10-CM

## 2021-11-08 DIAGNOSIS — E87.1 HYPONATREMIA: ICD-10-CM

## 2021-11-08 DIAGNOSIS — I20.0 UNSTABLE ANGINA (HCC): ICD-10-CM

## 2021-11-08 DIAGNOSIS — R73.03 PREDIABETES: ICD-10-CM

## 2021-11-08 DIAGNOSIS — N17.9 AKI (ACUTE KIDNEY INJURY) (HCC): ICD-10-CM

## 2021-11-08 DIAGNOSIS — R06.2 WHEEZE: ICD-10-CM

## 2021-11-08 DIAGNOSIS — R55 SYNCOPE AND COLLAPSE: Primary | ICD-10-CM

## 2021-11-08 DIAGNOSIS — Z23 NEED FOR INFLUENZA VACCINATION: ICD-10-CM

## 2021-11-08 DIAGNOSIS — F10.10 ALCOHOL ABUSE: ICD-10-CM

## 2021-11-08 PROCEDURE — 90674 CCIIV4 VAC NO PRSV 0.5 ML IM: CPT | Performed by: NURSE PRACTITIONER

## 2021-11-08 PROCEDURE — 99495 TRANSJ CARE MGMT MOD F2F 14D: CPT | Performed by: NURSE PRACTITIONER

## 2021-11-08 PROCEDURE — 1111F DSCHRG MED/CURRENT MED MERGE: CPT | Performed by: NURSE PRACTITIONER

## 2021-11-08 PROCEDURE — 90471 IMMUNIZATION ADMIN: CPT | Performed by: NURSE PRACTITIONER

## 2021-11-08 ASSESSMENT — ENCOUNTER SYMPTOMS
SINUS PRESSURE: 0
BLOOD IN STOOL: 0
NAUSEA: 0
ABDOMINAL DISTENTION: 0
BACK PAIN: 0
WHEEZING: 0
TROUBLE SWALLOWING: 0
CHEST TIGHTNESS: 0
DIARRHEA: 0
SORE THROAT: 0
SHORTNESS OF BREATH: 0
CONSTIPATION: 0
ABDOMINAL PAIN: 0
RHINORRHEA: 0
COUGH: 0

## 2021-11-08 NOTE — PROGRESS NOTES
Post-Discharge Transitional Care Management Services or Hospital Follow Up      Farhana Kurtz   YOB: 1970    Date of Office Visit:  11/8/2021  Date of Hospital Admission: 10/23/2021  Date of Hospital Discharge: 10/25/2021    Care management risk score Rising risk (score 2-5) and Complex Care (Scores >=6): 5     Non face to face  following discharge, date last encounter closed (first attempt may have been earlier): *No documented post hospital discharge outreach found in the last 14 days *No documented post hospital discharge outreach found in the last 14 days    Call initiated 2 business days of discharge: *No response recorded in the last 14 days    Patient Active Problem List   Diagnosis    Hypertriglyceridemia    Hypertension    GERD (gastroesophageal reflux disease)    CAD (coronary artery disease)    Presence of drug coated stent in right coronary artery    S/P cardiac catheter[facility]  3/27/2015 [facility]    Lumbar disc herniation with radiculopathy    Impaired fasting blood sugar    Unstable angina ([address]    COPD (chronic obstructive pulmonary disease) (HCC)    Precordial chest pain    Tobacco abuse    Acute bronchitis    Anxiety    Acute pancreatitis from High Fat Meal    Statin intolerance    Encounter for current long term use of antiplatelet drug    GITA (acute kidney injury) ([address]    Hypotensive episode    Alcohol abuse    Hyponatremia    Syncope and collapse       Allergies   Allergen Reactions    Chantix [Varenicline] Other (See Comments)     Chest pain    Lipitor [Atorvastatin] Other (See Comments)     myalgias    Livalo [Pitavastatin] Other (See Comments)     Myalgias      Patient presents to office today for a hospital follow-up. It is noted that he was admitted to the hospital for 2 nights on Saturday, October 23. Patient states that it was a normal day and they were driving to their daughter softball game.   While he was driving he felt his vision got very blurry and lights were extremely bright. He then became very diaphoretic and felt as though he was soaking wet. He pulled over off the side of the road. He stated he got out of the car and felt as though he needed to take off his clothes due to extreme anxiety and overwhelming feeling of being overheated. He also felt as though he cannot catch his breath was very short of breath. He denies any chest pain or heart palpitations. He denies any gastrointestinal symptoms such as nausea. It is a noted that he return into the car however now was a passenger and was laying flat. He then had to again urgently failed to get out of the car and is then noted that his wife was doing sternal rubs as he was unconscious and was not responding. EMS was called and patient's blood pressure was extremely low. He was taken to local emergency room and then  life flighted to [place].  During his admission he underwent testing including lab analysis, nuclear stress test, echocardiogram.  He was discharged home with a 48-hour Holter monitor, which is still pending. He is scheduled for his cardiology follow-up on November 15. Patient states he has been taking his blood pressure multiple times throughout the day. He is using a wrist cuff. He comments that he feels no different however there is times he has been as low as 99/66, and his height is 162/118. He denies any heart palpitations or chest pain.       Medications listed as ordered at the time of discharge from hospital  Yes     Medications marked \"taking\" at this time  Outpatient Medications Marked as Taking for the 11/8/21 encounter (Office Visit) with GILMA Rios CNP   Medication Sig Dispense Refill    albuterol (PROVENTIL) (2.5 MG/3ML) 0.083% nebulizer solution Take 3 mLs by nebulization every 4 hours as needed for Wheezing or Shortness of Breath (dispense nebules) 100 each 1    albuterol sulfate HFA (PROVENTIL HFA) 108 (90 Base) MCG/ACT stratification: LOW     Echo -   Left ventricle is normal in size. Global left ventricular systolic function  is normal. Calculated EF via 3D Heart Model is 54 %. Mild left ventricular hypertrophy. Trivial mitral regurgitation. Trivial tricuspid regurgitation. Trivial pulmonic insufficiency. Interval history/Current status:stable     Vitals:    11/08/21 1102 11/08/21 1216   BP: (!) 145/90 (!) 138/92   Site: Right Upper Arm Right Upper Arm   Position: Sitting Sitting   Cuff Size: Medium Adult Medium Adult   Pulse: 87    Resp: 18    Temp: 98.2 °F (36.8 °C)    TempSrc: Temporal    SpO2: 97%    Weight: 219 lb 3.2 oz (99.4 kg)      Body mass index is 29.73 kg/m². Wt Readings from Last 3 Encounters:   11/08/21 219 lb 3.2 oz (99.4 kg)   10/25/21 214 lb 11.7 oz (97.4 kg)   08/26/21 217 lb (98.4 kg)     BP Readings from Last 3 Encounters:   11/08/21 (!) 138/92   10/24/21 (!) 144/92   10/23/21 (!) 82/36     Review of Systems   Constitutional: Positive for fatigue. Negative for activity change, appetite change, chills and fever. HENT: Negative for congestion, ear pain, postnasal drip, rhinorrhea, sinus pressure, sneezing, sore throat and trouble swallowing. Respiratory: Negative for cough, chest tightness, shortness of breath and wheezing. Cardiovascular: Negative for chest pain, palpitations and leg swelling. Gastrointestinal: Negative for abdominal distention, abdominal pain, blood in stool, constipation, diarrhea and nausea. Genitourinary: Negative for difficulty urinating, dysuria, frequency, hematuria and urgency. Musculoskeletal: Negative for arthralgias, back pain, gait problem, joint swelling and myalgias. Skin: Negative for rash and wound. Allergic/Immunologic: Negative for environmental allergies. Neurological: Positive for headaches (recent over past few weeks? relieved with OTC ). Negative for dizziness, syncope, light-headedness and numbness.    Hematological: Does not bruise/bleed easily. Psychiatric/Behavioral: Positive for sleep disturbance. Negative for agitation, decreased concentration, self-injury and suicidal ideas. The patient is not nervous/anxious. Physical Exam:  Physical Exam  Vitals and nursing note reviewed. Constitutional:       General: He is not in acute distress. Appearance: Normal appearance. He is well-developed, well-groomed and overweight. He is not ill-appearing or toxic-appearing. Cardiovascular:      Rate and Rhythm: Normal rate and regular rhythm. No extrasystoles are present. Pulses:           Radial pulses are 2+ on the right side and 2+ on the left side. Heart sounds: Normal heart sounds, S1 normal and S2 normal. No murmur heard. Pulmonary:      Effort: Pulmonary effort is normal. No prolonged expiration or respiratory distress. Breath sounds: Normal breath sounds and air entry. Musculoskeletal:      Right lower leg: No edema. Left lower leg: No edema. Skin:     General: Skin is warm and dry. Coloration: Skin is not ashen, cyanotic, jaundiced or pale. Neurological:      Mental Status: He is alert and oriented to person, place, and time. Motor: Motor function is intact. Gait: Gait is intact. Psychiatric:         Attention and Perception: Attention and perception normal.         Mood and Affect: Mood and affect normal.         Speech: Speech normal.         Behavior: Behavior normal. Behavior is cooperative. Thought Content: Thought content normal. Thought content does not include suicidal ideation. Thought content does not include suicidal plan. Cognition and Memory: Cognition and memory normal.         Judgment: Judgment normal.       Assessment/Plan:  1. Syncope and collapse  -     ME DISCHARGE MEDS RECONCILED W/ CURRENT OUTPATIENT MED LIST  -     US CAROTID ARTERY BILATERAL; Future  -     CBC With Auto Differential; Future  -     Sedimentation Rate;  Future  -     C-Reactive Protein; Future  2. Unstable angina (HCC)  -     [place] DISCHARGE MEDS RECONCILED W/ CURRENT OUTPATIENT MED LIST  -     US CAROTID ARTERY BILATERAL; Future  3. Coronary artery disease involving native coronary artery of native heart without angina pectoris  4. Primary hypertension  5. GITA (acute kidney injury) ([facility]  -     Urinalysis Reflex to Culture; Future  6. Hyponatremia  7. Alcohol abuse  8. Prediabetes  -     Hemoglobin A1C; Future  -     Comprehensive Metabolic Panel; Future  9. Need for influenza vaccination  -     INFLUENZA, MDCK QUADV, 2 YRS AND OLDER, IM, PF, PREFILL SYR OR SDV, 0.5ML (FLUCELVAX QUADV, PF)  10. Wheeze  -     albuterol (PROVENTIL) (2.5 MG/3ML) 0.083% nebulizer solution; Take 3 mLs by nebulization every 4 hours as needed for Wheezing or Shortness of Breath (dispense nebules), Disp-100 each, R-1Normal  -     albuterol sulfate HFA (PROVENTIL HFA) 108 (90 Base) MCG/ACT inhaler; Inhale 2 puffs into the lungs every 4 hours as needed for Wheezing or Shortness of Breath Generic albuterol is ok., Disp-1 each, R-2Normal  11. Chronic obstructive pulmonary disease, unspecified COPD type (HCC)  -     albuterol (PROVENTIL) (2.5 MG/3ML) 0.083% nebulizer solution; Take 3 mLs by nebulization every 4 hours as needed for Wheezing or Shortness of Breath (dispense nebules), Disp-100 each, R-1Normal  -     albuterol sulfate HFA (PROVENTIL HFA) 108 (90 Base) MCG/ACT inhaler; Inhale 2 puffs into the lungs every 4 hours as needed for Wheezing or Shortness of Breath Generic albuterol is ok., Disp-1 each, R-2Normal      Benefit from Amlodipine? Help with BP control and coronary spasms? Would like cardiology opinion prior to prescribing? Carotid dopplers ordered, assist in scheduling. Holter monitor still pending    Medical Decision Making: moderate complexity    An electronic signature was used to authenticate this note.     --Gustavo Garcia, APRN - CNP   Please note that this chart was generated using voice recognition Dragon dictation software. Although every effort was made to ensure the accuracy of this automated transcription, some errors in transcription may have occurred.     Chart review, assessment findings, and documentation completed with assistance of  of Nurse Practitioner program.    Chart review, assessment findings, and documentation completed with assistance of  of Nurse Practitioner program.

## 2021-11-08 NOTE — PROGRESS NOTES
Vaccine Information Sheet, \"Influenza - Inactivated\"  given to Francesca Nassar  ,or parent/legal guardian of  Francesca Nassar and verbalized understanding. Patient responses:    Have you ever had a reaction to a flu vaccine? No  Are you able to eat eggs without adverse effects? Yes  Do you have any current illness? No  Have you ever had Guillian Hessmer Syndrome? No    Flu vaccine given per order. Please see immunization tab.

## 2021-11-09 ENCOUNTER — HOSPITAL ENCOUNTER (OUTPATIENT)
Age: 51
Discharge: HOME | End: 2021-11-09
Payer: COMMERCIAL

## 2021-11-09 DIAGNOSIS — R55 SYNCOPE AND COLLAPSE: ICD-10-CM

## 2021-11-09 DIAGNOSIS — N17.9 AKI (ACUTE KIDNEY INJURY) (HCC): ICD-10-CM

## 2021-11-09 DIAGNOSIS — R73.03 PREDIABETES: ICD-10-CM

## 2021-11-09 LAB
-: ABNORMAL
ABSOLUTE EOS #: 0.12 K/UL (ref 0–0.44)
ABSOLUTE IMMATURE GRANULOCYTE: <0.03 K/UL (ref 0–0.3)
ABSOLUTE LYMPH #: 1.26 K/UL (ref 1.1–3.7)
ABSOLUTE MONO #: 0.48 K/UL (ref 0.1–1.2)
ALBUMIN SERPL-MCNC: 4.7 G/DL (ref 3.5–5.2)
ALBUMIN/GLOBULIN RATIO: 1.7 (ref 1–2.5)
ALP BLD-CCNC: 51 U/L (ref 40–129)
ALT SERPL-CCNC: 17 U/L (ref 5–41)
AMORPHOUS: ABNORMAL
ANION GAP SERPL CALCULATED.3IONS-SCNC: 12 MMOL/L (ref 9–17)
AST SERPL-CCNC: 19 U/L
BACTERIA: ABNORMAL
BASOPHILS # BLD: 1 % (ref 0–2)
BASOPHILS ABSOLUTE: 0.06 K/UL (ref 0–0.2)
BILIRUB SERPL-MCNC: 0.6 MG/DL (ref 0.3–1.2)
BILIRUBIN URINE: NEGATIVE
BUN BLDV-MCNC: 15 MG/DL (ref 6–20)
BUN/CREAT BLD: ABNORMAL (ref 9–20)
C-REACTIVE PROTEIN: 7.1 MG/L (ref 0–5)
CALCIUM SERPL-MCNC: 9.7 MG/DL (ref 8.6–10.4)
CASTS UA: ABNORMAL /LPF
CHLORIDE BLD-SCNC: 99 MMOL/L (ref 98–107)
CO2: 24 MMOL/L (ref 20–31)
COLOR: YELLOW
COMMENT UA: ABNORMAL
CREAT SERPL-MCNC: 0.92 MG/DL (ref 0.7–1.2)
CRYSTALS, UA: ABNORMAL /HPF
DIFFERENTIAL TYPE: ABNORMAL
EOSINOPHILS RELATIVE PERCENT: 2 % (ref 1–4)
EPITHELIAL CELLS UA: ABNORMAL /HPF (ref 0–5)
ESTIMATED AVERAGE GLUCOSE: 123 MG/DL
GFR AFRICAN AMERICAN: >60 ML/MIN
GFR NON-AFRICAN AMERICAN: >60 ML/MIN
GFR SERPL CREATININE-BSD FRML MDRD: ABNORMAL ML/MIN/{1.73_M2}
GFR SERPL CREATININE-BSD FRML MDRD: ABNORMAL ML/MIN/{1.73_M2}
GLUCOSE BLD-MCNC: 140 MG/DL (ref 70–99)
GLUCOSE URINE: NEGATIVE
HBA1C MFR BLD: 5.9 % (ref 4–6)
HCT VFR BLD CALC: 44.7 % (ref 40.7–50.3)
HEMOGLOBIN: 15 G/DL (ref 13–17)
IMMATURE GRANULOCYTES: 0 %
KETONES, URINE: NEGATIVE
LEUKOCYTE ESTERASE, URINE: NEGATIVE
LYMPHOCYTES # BLD: 17 % (ref 24–43)
MCH RBC QN AUTO: 32.8 PG (ref 25.2–33.5)
MCHC RBC AUTO-ENTMCNC: 33.6 G/DL (ref 28.4–34.8)
MCV RBC AUTO: 97.8 FL (ref 82.6–102.9)
MONOCYTES # BLD: 7 % (ref 3–12)
MUCUS: ABNORMAL
NITRITE, URINE: NEGATIVE
NRBC AUTOMATED: 0 PER 100 WBC
OTHER OBSERVATIONS UA: ABNORMAL
PDW BLD-RTO: 11.9 % (ref 11.8–14.4)
PH UA: 6 (ref 5–9)
PLATELET # BLD: 207 K/UL (ref 138–453)
PLATELET ESTIMATE: ABNORMAL
PMV BLD AUTO: 10.2 FL (ref 8.1–13.5)
POTASSIUM SERPL-SCNC: 4.4 MMOL/L (ref 3.7–5.3)
PROTEIN UA: NEGATIVE
RBC # BLD: 4.57 M/UL (ref 4.21–5.77)
RBC # BLD: ABNORMAL 10*6/UL
RBC UA: ABNORMAL /HPF (ref 0–2)
RENAL EPITHELIAL, UA: ABNORMAL /HPF
SEDIMENTATION RATE, ERYTHROCYTE: 7 MM (ref 0–20)
SEG NEUTROPHILS: 73 % (ref 36–65)
SEGMENTED NEUTROPHILS ABSOLUTE COUNT: 5.4 K/UL (ref 1.5–8.1)
SODIUM BLD-SCNC: 135 MMOL/L (ref 135–144)
SPECIFIC GRAVITY UA: 1.02 (ref 1.01–1.02)
TOTAL PROTEIN: 7.4 G/DL (ref 6.4–8.3)
TRICHOMONAS: ABNORMAL
TURBIDITY: CLEAR
URINE HGB: NEGATIVE
UROBILINOGEN, URINE: NORMAL
WBC # BLD: 7.3 K/UL (ref 3.5–11.3)
WBC # BLD: ABNORMAL 10*3/UL
WBC UA: ABNORMAL /HPF (ref 0–5)
YEAST: ABNORMAL

## 2021-11-09 PROCEDURE — 36415 COLL VENOUS BLD VENIPUNCTURE: CPT

## 2021-11-09 PROCEDURE — 83036 HEMOGLOBIN GLYCOSYLATED A1C: CPT

## 2021-11-09 PROCEDURE — 85025 COMPLETE CBC W/AUTO DIFF WBC: CPT

## 2021-11-09 PROCEDURE — 80053 COMPREHEN METABOLIC PANEL: CPT

## 2021-11-09 PROCEDURE — 81001 URINALYSIS AUTO W/SCOPE: CPT

## 2021-11-09 PROCEDURE — 86140 C-REACTIVE PROTEIN: CPT

## 2021-11-09 PROCEDURE — 85652 RBC SED RATE AUTOMATED: CPT

## 2021-11-09 RX ORDER — ALBUTEROL SULFATE 2.5 MG/3ML
2.5 SOLUTION RESPIRATORY (INHALATION) EVERY 4 HOURS PRN
Qty: 100 EACH | Refills: 1 | Status: SHIPPED | OUTPATIENT
Start: 2021-11-09 | End: 2022-11-09

## 2021-11-09 RX ORDER — ALBUTEROL SULFATE 90 UG/1
2 AEROSOL, METERED RESPIRATORY (INHALATION) EVERY 4 HOURS PRN
Qty: 1 EACH | Refills: 2 | Status: SHIPPED | OUTPATIENT
Start: 2021-11-09 | End: 2022-11-09

## 2021-11-09 NOTE — PROCEDURES
[address] [facility] 30                                 HOLTER MONITOR    PATIENT NAME: Cy Ruff                     :        1970  MED REC NO:   [id number]                             ROOM:       7987  ACCOUNT NO:   [de-identified]                           ADMIT DATE: 10/23/2021  PROVIDER:     Tucker Pineda MD    HOLTER MONITOR 48-HOURS    DATE OF STUDY:  10/25/2021    INDICATIONS: Syncope    ORDERING PROVIDER: Dr. Park Chapa PROVIDER: DOMINIQUE Hernandez    INTERPRETING PHYSICIAN: Dr. Saida Cherry:  The patient appeared to remain in Sinus rhythm throughout recording with  sinus bradycardia and sinus tachycardia noted. Occasional Premature atrial complexes noted. The patient recorded diary  events, the rhythm was sinus. 1. Sinus rhythm with Sinus bradycardia and Sinus tachycardia from 53 to  147 bpm.  2. Occasional Premature atrial complexes.              Sheryl Leon MD    D: 2021 12:31:16       T: 2021 12:32:21     [id number]  Job#: [id number]     Doc#: Unknown    CC:

## 2021-11-10 NOTE — DISCHARGE SUMMARY
[address]     Department of Internal Medicine - Staff Internal Medicine Teaching Service    INPATIENT DISCHARGE SUMMARY      Patient Identification:  Danis Rockwell is a 54 y.o. male. :  1970  MRN: [id number]     Acct: [de-identified]   PCP: GILMA Mahoney CNP  Admit Date:  10/23/2021  Discharge date and time: 10/25/2021  5:15 PM   Attending Provider: No att. providers found                                     [address] Problem Lists:  Principal Problem:    GITA (acute kidney injury) ([address]  Active Problems:    GERD (gastroesophageal reflux disease)    CAD (coronary artery disease)    Tobacco abuse    Hypotensive episode    Alcohol abuse    Hyponatremia    Syncope and collapse  Resolved Problems:    * No resolved hospital problems. *      HOSPITAL STAY     Brief Inpatient course:   Danis Rockwell is a 54 y.o. male PMH of hypertension, smoking and CAD s/p cardiac cath who was admitted for the management of GITA (acute kidney injury) [facility]), presented to the emergency department with an episode of syncope. the patient was driving when he experienced seeing everything white, associated with chest discomfort and diaphoresis. patient denies any episode in the past.  Patient's wife reports BP of 49/29 checked by EMS before arrival, received IV fluids. On arrival to ED patient denied any chest pain and BP 99/64. His troponins are flat and EKG repeat showed no concerns for STEMI . cardiology is consulted. Echo showed EF of 54% with LVH and stress test is negative. GITA is present on arrival which has been resolved without complications with IVF. Patient is discharged on Holter monitoring OP for 48 hours  and advised follow-up with cardiology.       Procedures/ Significant Interventions:    Cardiac stress test,  echocardiography    Consults:     Consults:     Final Specialist Recommendations/Findings:   IP CONSULT TO CARDIOLOGY  IP CONSULT TO INTERNAL MEDICINE  IP CONSULT TO CASE MANAGEMENT      Any Hospital Acquired Infections: none    Discharge Functional Status:  stable    DISCHARGE PLAN     Disposition: home    Patient Instructions:   Discharge Medication List as of 10/25/2021  4:53 PM      CONTINUE these medications which have NOT CHANGED    Details   sertraline (ZOLOFT) 25 MG tablet Take 1 tablet by mouth daily, Disp-90 tablet, R-1Normal      losartan-hydroCHLOROthiazide (HYZAAR) 100-25 MG per tablet Take 1 tablet by mouth daily, Disp-90 tablet, R-1Pt need apptNormal      carvedilol (COREG) 12.5 MG tablet Take 1 tablet by mouth 2 times daily, Disp-180 tablet, R-1Normal      clopidogrel (PLAVIX) 75 MG tablet Take 1 tablet by mouth daily, Disp-90 tablet, R-0Normal      albuterol (PROVENTIL) (2.5 MG/3ML) 0.083% nebulizer solution Take 3 mLs by nebulization every 4 hours as needed for Wheezing (dispense nebules), Disp-50 vial, R-5Normal      albuterol sulfate HFA (PROVENTIL HFA) 108 (90 Base) MCG/ACT inhaler Inhale 2 puffs into the lungs every 4 hours as needed for Wheezing Generic albuterol is ok., Disp-1 Inhaler, R-5Normal      Respiratory Therapy Supplies (NEBULIZER COMPRESSOR) KIT ONCE Starting Thu 1/11/2018, Disp-1 kit, R-0, Print      Multiple Vitamins-Minerals (CENTRUM ADULTS PO) Take 1 tablet by mouth dailyHistorical Med      aspirin EC 81 MG EC tablet Take 1 tablet by mouth daily, Disp-30 tablet, R-3Normal      esomeprazole Magnesium (NEXIUM) 40 MG PACK Take 40 mg by mouth daily. Activity: activity as tolerated    Diet: regular diet    Follow-up:    Arlen Ortiz MD  [facility]  [address] [address]      Cardiology -- Tuesday, November 23 at 11:30 at [facility], [facility]  [address]  [phone]    In 2 weeks  Hospital follow up, admitted for syncope      Patient Instructions: Holter monitoring for 48 hours as recommended by cardiology.   Follow-up with cardiology and PCP  Follow up labs: none  Follow up imaging: none    Note that over 30 minutes was spent in preparing discharge papers, discussing discharge with patient, medication review, etc.      Marylee Foley, MD,  Internal Medicine Resident, PGY-1  [address], [place]  11/9/2021, 8:11 PM

## 2021-11-15 ENCOUNTER — OFFICE VISIT (OUTPATIENT)
Dept: CARDIOLOGY | Age: 51
End: 2021-11-15
Payer: COMMERCIAL

## 2021-11-15 VITALS
WEIGHT: 221.8 LBS | BODY MASS INDEX: 30.04 KG/M2 | RESPIRATION RATE: 16 BRPM | SYSTOLIC BLOOD PRESSURE: 145 MMHG | HEIGHT: 72 IN | DIASTOLIC BLOOD PRESSURE: 90 MMHG | OXYGEN SATURATION: 96 % | HEART RATE: 77 BPM

## 2021-11-15 DIAGNOSIS — Z95.820 S/P ANGIOPLASTY WITH STENT: ICD-10-CM

## 2021-11-15 DIAGNOSIS — I10 PRIMARY HYPERTENSION: ICD-10-CM

## 2021-11-15 DIAGNOSIS — E78.2 MIXED HYPERLIPIDEMIA: ICD-10-CM

## 2021-11-15 DIAGNOSIS — I25.10 ASHD (ARTERIOSCLEROTIC HEART DISEASE): Primary | ICD-10-CM

## 2021-11-15 PROCEDURE — 99244 OFF/OP CNSLTJ NEW/EST MOD 40: CPT | Performed by: FAMILY MEDICINE

## 2021-11-15 RX ORDER — CARVEDILOL 25 MG/1
25 TABLET ORAL DAILY
Qty: 90 TABLET | Refills: 3 | Status: SHIPPED | OUTPATIENT
Start: 2021-11-15 | End: 2022-09-27 | Stop reason: SDUPTHER

## 2021-11-15 RX ORDER — RIVAROXABAN 2.5 MG/1
2.5 TABLET, FILM COATED ORAL 2 TIMES DAILY
Qty: 60 TABLET | Refills: 11 | Status: SHIPPED | OUTPATIENT
Start: 2021-11-15 | End: 2022-05-24 | Stop reason: SDUPTHER

## 2021-11-15 NOTE — PROGRESS NOTES
past. He denies having any increased shortness of breath, lightheaded/dizziness or palpitations. He denies having any abdominal pain, bleeding problems, bowel issues, problems with his medications or any other concerns at this time. No cough, fever or chills. No nausea or vomiting. PAST MEDICAL HISTORY:        Past Medical History:   Diagnosis Date    Arthritis     CAD (coronary artery disease)     COPD (chronic obstructive pulmonary disease) ([address]     Disc disorder     GERD (gastroesophageal reflux disease)     Hypertension     Hypertriglyceridemia     COLEMAN (obstructive sleep apnea)     DOES NOT USE HIS CPAP MACHINE AS DIRECTED    Pancreatitis 2013    Normal Ultrasound GB 2013       CURRENT ALLERGIES: Chantix [varenicline], Lipitor [atorvastatin], and Livalo [pitavastatin] REVIEW OF SYSTEMS: 14 systems were reviewed. Pertinent positives and negatives as above, all else negative.      Past Surgical History:   Procedure Laterality Date    CORONARY ANGIOPLASTY WITH STENT PLACEMENT  2004    Stent x 3 RCA    CORONARY ANGIOPLASTY WITH STENT PLACEMENT  03/27/2015    CORINA RCA   stents  total    DIAGNOSTIC CARDIAC CATH LAB PROCEDURE      HERNIA REPAIR  1094    umbilical    KNEE ARTHROSCOPY      right knee for ACL repair    KNEE ARTHROSCOPY Left 08/18/2016    chondroplasty , open excision baker cyst - Dr. Destinee Dove Left 08/18/2016    Medial and lateral - Dr. Shabnam Duran PTCA      Social History:  Social History     Tobacco Use    Smoking status: Current Some Day Smoker     Packs/day: 1.00     Years: 30.00     Pack years: 30.00     Types: Cigarettes    Smokeless tobacco: Never Used   Vaping Use    Vaping Use: Never used   Substance Use Topics    Alcohol use: Not Currently     Alcohol/week: 3.0 standard drinks     Types: 3 Shots of liquor per week    Drug use: No        CURRENT MEDICATIONS:        Outpatient Medications Marked as Taking for the 11/15/21 encounter (Office Visit) with Shweta Chand MD [facility]   Medication Sig Dispense Refill    albuterol (PROVENTIL) (2.5 MG/3ML) 0.083% nebulizer solution Take 3 mLs by nebulization every 4 hours as needed for Wheezing or Shortness of Breath (dispense nebules) 100 each 1    albuterol sulfate HFA (PROVENTIL HFA) 108 (90 Base) MCG/ACT inhaler Inhale 2 puffs into the lungs every 4 hours as needed for Wheezing or Shortness of Breath Generic albuterol is ok. 1 each 2    sertraline (ZOLOFT) 25 MG tablet Take 1 tablet by mouth daily 90 tablet 1    losartan-hydroCHLOROthiazide (HYZAAR) 100-25 MG per tablet Take 1 tablet by mouth daily 90 tablet 1    carvedilol (COREG) 12.5 MG tablet Take 1 tablet by mouth 2 times daily 180 tablet 1    clopidogrel (PLAVIX) 75 MG tablet Take 1 tablet by mouth daily 90 tablet 0    Respiratory Therapy Supplies (NEBULIZER COMPRESSOR) KIT 1 kit by Does not apply route once for 1 dose 1 kit 0    Multiple Vitamins-Minerals (CENTRUM ADULTS PO) Take 1 tablet by mouth daily      aspirin EC 81 MG EC tablet Take 1 tablet by mouth daily 30 tablet 3    esomeprazole Magnesium (NEXIUM) 40 MG PACK Take 40 mg by mouth daily. FAMILY HISTORY: family history includes Cancer in his maternal aunt; Heart Disease in his mother. Physical Examination:      BP (!) 145/90 (Site: Right Upper Arm, Position: Sitting, Cuff Size: Medium Adult)   Pulse 77   Resp 16   Ht 6' (1.829 m)   Wt 221 lb 12.8 oz (100.6 kg)   SpO2 96%   BMI 30.08 kg/m²  Body mass index is 30.08 kg/m². Constitutional: He is oriented to person. He appears well-developed and well-nourished. In no acute distress. HEENT: Normocephalic and atraumatic. No JVD present. Carotid bruit is not present. No mass and no thyromegaly present. No lymphadenopathy present. Cardiovascular: Normal rate, regular rhythm, normal heart sounds. Exam reveals no gallop and no friction rubs. 1/6 systolic murmur, 5th intercostal space on the LEFT in the mid-clavicular line (cardiac apex). Pulmonary/Chest: Effort normal and breath sounds normal. No respiratory distress. He has no wheezes, rhonchi or rales. Abdominal: Soft, non-tender. Bowel sounds and aorta are normal. He exhibits no organomegaly, mass or bruit. Extremities: Trace. No cyanosis or clubbing. 2+ radial and carotid pulses. Distal extremity pulses: 2+ bilaterally. .  Neurological: He is alert and oriented to person. No evidence of gross cranial nerve deficit. Coordination appeared normal.   Skin: Skin is warm and dry. There is no rash or diaphoresis. Psychiatric: He has a normal mood and affect. His speech is normal and behavior is normal.      MOST RECENT LABS ON RECORD:   Lab Results   Component Value Date    WBC 7.3 11/09/2021    HGB 15.0 11/09/2021    HCT 44.7 11/09/2021     11/09/2021    CHOL 164 09/22/2021    TRIG 145 09/22/2021    HDL 76 09/22/2021    LDLDIRECT  08/05/2013     Unable to report LDL Direct due to Triglycerides greater than 1200 mg/dL. ALT 17 11/09/2021    AST 19 11/09/2021     11/09/2021    K 4.4 11/09/2021    CL 99 11/09/2021    CREATININE 0.92 11/09/2021    BUN 15 11/09/2021    CO2 24 11/09/2021    TSH 1.67 09/22/2021    PSA 0.25 09/22/2021    INR 1.1 10/23/2021    GLUF 147 (H) 09/22/2021    LABA1C 5.9 11/09/2021    LABMICR 24 (H) 05/22/2020    BNP 9 08/05/2013       ASSESSMENT:     1. ASHD (arteriosclerotic heart disease)    2. S/P angioplasty with stent    3. Primary hypertension    4. Mixed hyperlipidemia       PLAN:         Atherosclerotic Heart Disease: S/P Stenting x4 in 2004 & 2015  Antiplatelet Agent: Continue Aspirin 81 mg daily. and  STOP clopidogrel (Plavix)   Based on the patients increased risk of future cardiovascular events, I discussed the risks and benefits of starting him on Xarelto 2.5 mg bid.  I explained that as per the [facility] trial published in the [facility],[address] in 2017, treatment in patients with a known history of severe coronary artery disease had a 22% relative risk reduction in CV death, a 14% relative risk reduction in MI, and a 42% relative risk reduction in stroke. I also explained that the this will increase his risk of bleeding which can rarely be fatal but told them that they needed to watch for any blood in their stool or black tarry stools and call immediately if this develops. The patient was in agreement with this plan and therefore I started Xarelto 2.5 mg bid.  Beta Blocker: INCREASE to Carvedilol (Coreg) 25 mg twice daily. I also discussed the potential side effects of this medication including lightheadedness and dizziness and instructed them to stop the medication of this occurs and call our office if this occurs.  Anti-anginal medications: Not indicated at this time.  Cholesterol Reduction Therapy: Not indicated at this time.  Additional counseling: I advised them to call our office or go to the emergency room if they developed worsening or persistent chest pain or increased shortness of breath as this could be life threatening. Syncope/near syncope of unknown etiology: First time occurrence and had not reoccurred since   · Nonpharmacologic counseling: Because of his condition, I reminded him to try and keep himself well-hydrated and to take extra time when moving from laying to sitting, sitting to standing and standing to walking. I also explained to him to help improve his symptoms he should include 3 g sodium diet, 1 or 2 L of sports drinks daily, knee-high compressions stockings.  Essential Hypertension: Borderline controlled  · ACE Inibitor/ARB: Continue losartan/HCTZ (Hyzaar) 100/25 mg every morning. · Diuretics: Continue losartan/HCTZ (Hyzaar) 100/25 mg every morning. · Calcium Channel Blocker: Not indicated at this time. · Beta Blocker: INCREASE to Carvedilol (Coreg) 25 mg twice daily. · Hyperlipidemia: Mixed - Last LDL on 9/22/2021 was 59 mg/dL   · Cholesterol Reduction Therapy: Not indicated at this time. Intolerance to statins - he was not interested in starting Zetia at this time. · LDL at goal     In the meantime, I encouraged Mr. Sky Mccormick to continue to take his other medications. FOLLOW UP:   I told Mr. Sky Mccormick to call my office if he had any problems, but otherwise I asked him to No follow-ups on file. However, I would be happy to see him sooner should the need arise. Sincerely,  Kayla Mishra. Sotero MCCANN, MS, F.A.C.C. [facility] Cardiology Specialist    [address] [place] [address]  Phone: [phone], Fax: [phone]     I believe that the risk of significant morbidity and mortality related to the patient's current medical conditions are: Intermediate. The documentation recorded by the scribe, accurately and completely reflects the services I personally performed and the decisions made by me. Yosef Davis MD, MS, F.A.C.C.  November 15, 2021

## 2021-11-15 NOTE — PATIENT INSTRUCTIONS
SURVEY:    You may be receiving a survey from [employer] regarding your visit today. Please complete the survey to enable us to provide the highest quality of care to you and your family. If you cannot score us a very good on any question, please call the office to discuss how we could have made your experience a very good one. Thank you.

## 2021-11-17 ENCOUNTER — HOSPITAL ENCOUNTER (OUTPATIENT)
Dept: VASCULAR LAB | Age: 51
Discharge: HOME | End: 2021-11-19
Payer: COMMERCIAL

## 2021-11-17 DIAGNOSIS — R55 SYNCOPE AND COLLAPSE: ICD-10-CM

## 2021-11-17 DIAGNOSIS — I20.0 UNSTABLE ANGINA (HCC): ICD-10-CM

## 2021-11-17 PROCEDURE — 93880 EXTRACRANIAL BILAT STUDY: CPT

## 2021-11-18 ENCOUNTER — TELEPHONE (OUTPATIENT)
Dept: FAMILY MEDICINE CLINIC | Age: 51
End: 2021-11-18

## 2021-11-18 DIAGNOSIS — Z95.5 PRESENCE OF DRUG COATED STENT IN RIGHT CORONARY ARTERY: Primary | ICD-10-CM

## 2021-11-19 RX ORDER — RIVAROXABAN 2.5 MG/1
2.5 TABLET, FILM COATED ORAL 2 TIMES DAILY
Qty: 60 TABLET | Refills: 0 | COMMUNITY
Start: 2021-11-19 | End: 2022-11-19

## 2021-12-02 ENCOUNTER — HOSPITAL ENCOUNTER (OUTPATIENT)
Dept: CARDIAC CATH/INVASIVE PROCEDURES | Age: 51
Discharge: HOME | DRG: 260 | End: 2021-12-02
Attending: INTERNAL MEDICINE
Payer: COMMERCIAL

## 2021-12-02 ENCOUNTER — HOSPITAL ENCOUNTER (INPATIENT)
Age: 51
LOS: 1 days | Discharge: HOME | DRG: 260 | End: 2021-12-02
Attending: INTERNAL MEDICINE | Admitting: INTERNAL MEDICINE
Payer: COMMERCIAL

## 2021-12-02 VITALS
RESPIRATION RATE: 16 BRPM | SYSTOLIC BLOOD PRESSURE: 170 MMHG | OXYGEN SATURATION: 98 % | HEART RATE: 72 BPM | DIASTOLIC BLOOD PRESSURE: 101 MMHG

## 2021-12-02 VITALS
DIASTOLIC BLOOD PRESSURE: 104 MMHG | RESPIRATION RATE: 16 BRPM | BODY MASS INDEX: 29.8 KG/M2 | SYSTOLIC BLOOD PRESSURE: 169 MMHG | HEART RATE: 66 BPM | HEIGHT: 72 IN | WEIGHT: 220 LBS | TEMPERATURE: 97.6 F | OXYGEN SATURATION: 96 %

## 2021-12-02 PROBLEM — I46.9 CARDIAC ARREST (HCC): Status: ACTIVE | Noted: 2021-12-02

## 2021-12-02 PROCEDURE — C1764 EVENT RECORDER, CARDIAC: HCPCS

## 2021-12-02 PROCEDURE — 6360000002 HC RX W HCPCS

## 2021-12-02 PROCEDURE — 2709999900 HC NON-CHARGEABLE SUPPLY

## 2021-12-02 PROCEDURE — 93458 L HRT ARTERY/VENTRICLE ANGIO: CPT | Performed by: FAMILY MEDICINE

## 2021-12-02 PROCEDURE — B2111ZZ FLUOROSCOPY OF MULTIPLE CORONARY ARTERIES USING LOW OSMOLAR CONTRAST: ICD-10-PCS | Performed by: FAMILY MEDICINE

## 2021-12-02 PROCEDURE — 6360000004 HC RX CONTRAST MEDICATION: Performed by: INTERNAL MEDICINE

## 2021-12-02 PROCEDURE — 1200000000 HC SEMI PRIVATE

## 2021-12-02 PROCEDURE — 2500000003 HC RX 250 WO HCPCS

## 2021-12-02 PROCEDURE — 99152 MOD SED SAME PHYS/QHP 5/>YRS: CPT | Performed by: FAMILY MEDICINE

## 2021-12-02 PROCEDURE — 4A023N7 MEASUREMENT OF CARDIAC SAMPLING AND PRESSURE, LEFT HEART, PERCUTANEOUS APPROACH: ICD-10-PCS | Performed by: FAMILY MEDICINE

## 2021-12-02 PROCEDURE — 2580000003 HC RX 258: Performed by: FAMILY MEDICINE

## 2021-12-02 PROCEDURE — C1894 INTRO/SHEATH, NON-LASER: HCPCS

## 2021-12-02 PROCEDURE — 0JH632Z INSERTION OF MONITORING DEVICE INTO CHEST SUBCUTANEOUS TISSUE AND FASCIA, PERCUTANEOUS APPROACH: ICD-10-PCS | Performed by: FAMILY MEDICINE

## 2021-12-02 PROCEDURE — C1769 GUIDE WIRE: HCPCS

## 2021-12-02 PROCEDURE — B2151ZZ FLUOROSCOPY OF LEFT HEART USING LOW OSMOLAR CONTRAST: ICD-10-PCS | Performed by: FAMILY MEDICINE

## 2021-12-02 PROCEDURE — 33285 INSJ SUBQ CAR RHYTHM MNTR: CPT | Performed by: FAMILY MEDICINE

## 2021-12-02 PROCEDURE — C1887 CATHETER, GUIDING: HCPCS

## 2021-12-02 PROCEDURE — 94761 N-INVAS EAR/PLS OXIMETRY MLT: CPT

## 2021-12-02 PROCEDURE — 99254 IP/OBS CNSLTJ NEW/EST MOD 60: CPT | Performed by: PHYSICIAN ASSISTANT

## 2021-12-02 RX ORDER — ACETAMINOPHEN 325 MG/1
650 TABLET ORAL EVERY 4 HOURS PRN
Status: CANCELLED | OUTPATIENT
Start: 2021-12-02

## 2021-12-02 RX ORDER — SODIUM CHLORIDE 9 MG/ML
25 INJECTION, SOLUTION INTRAVENOUS PRN
Status: CANCELLED | OUTPATIENT
Start: 2021-12-02

## 2021-12-02 RX ORDER — SODIUM CHLORIDE 0.9 % (FLUSH) 0.9 %
10 SYRINGE (ML) INJECTION EVERY 12 HOURS SCHEDULED
Status: DISCONTINUED | OUTPATIENT
Start: 2021-12-02 | End: 2021-12-02 | Stop reason: HOSPADM

## 2021-12-02 RX ORDER — ACETAMINOPHEN 325 MG/1
650 TABLET ORAL EVERY 4 HOURS PRN
Status: DISCONTINUED | OUTPATIENT
Start: 2021-12-02 | End: 2021-12-02 | Stop reason: HOSPADM

## 2021-12-02 RX ORDER — POLYETHYLENE GLYCOL 3350 17 G/17G
17 POWDER, FOR SOLUTION ORAL DAILY PRN
Status: DISCONTINUED | OUTPATIENT
Start: 2021-12-02 | End: 2021-12-02 | Stop reason: HOSPADM

## 2021-12-02 RX ORDER — SODIUM CHLORIDE 0.9 % (FLUSH) 0.9 %
10 SYRINGE (ML) INJECTION EVERY 12 HOURS SCHEDULED
Status: CANCELLED | OUTPATIENT
Start: 2021-12-02

## 2021-12-02 RX ORDER — SERTRALINE HYDROCHLORIDE 25 MG/1
25 TABLET, FILM COATED ORAL DAILY
Status: DISCONTINUED | OUTPATIENT
Start: 2021-12-03 | End: 2021-12-02 | Stop reason: HOSPADM

## 2021-12-02 RX ORDER — ASPIRIN 81 MG/1
81 TABLET ORAL DAILY
Status: DISCONTINUED | OUTPATIENT
Start: 2021-12-02 | End: 2021-12-02 | Stop reason: HOSPADM

## 2021-12-02 RX ORDER — NITROGLYCERIN 0.4 MG/1
0.4 TABLET SUBLINGUAL EVERY 5 MIN PRN
Status: DISCONTINUED | OUTPATIENT
Start: 2021-12-02 | End: 2021-12-02 | Stop reason: HOSPADM

## 2021-12-02 RX ORDER — SODIUM CHLORIDE 0.9 % (FLUSH) 0.9 %
10 SYRINGE (ML) INJECTION PRN
Status: DISCONTINUED | OUTPATIENT
Start: 2021-12-02 | End: 2021-12-02 | Stop reason: HOSPADM

## 2021-12-02 RX ORDER — SODIUM CHLORIDE 9 MG/ML
25 INJECTION, SOLUTION INTRAVENOUS PRN
Status: DISCONTINUED | OUTPATIENT
Start: 2021-12-02 | End: 2021-12-02 | Stop reason: HOSPADM

## 2021-12-02 RX ORDER — PANTOPRAZOLE SODIUM 40 MG/1
40 TABLET, DELAYED RELEASE ORAL
Status: DISCONTINUED | OUTPATIENT
Start: 2021-12-03 | End: 2021-12-02 | Stop reason: HOSPADM

## 2021-12-02 RX ORDER — SODIUM CHLORIDE 9 MG/ML
INJECTION, SOLUTION INTRAVENOUS CONTINUOUS
Status: DISCONTINUED | OUTPATIENT
Start: 2021-12-02 | End: 2021-12-02 | Stop reason: HOSPADM

## 2021-12-02 RX ORDER — SODIUM CHLORIDE 0.9 % (FLUSH) 0.9 %
5-40 SYRINGE (ML) INJECTION EVERY 12 HOURS SCHEDULED
Status: DISCONTINUED | OUTPATIENT
Start: 2021-12-02 | End: 2021-12-02 | Stop reason: HOSPADM

## 2021-12-02 RX ORDER — ACETAMINOPHEN 325 MG/1
650 TABLET ORAL EVERY 6 HOURS PRN
Status: DISCONTINUED | OUTPATIENT
Start: 2021-12-02 | End: 2021-12-02 | Stop reason: HOSPADM

## 2021-12-02 RX ORDER — CARVEDILOL 25 MG/1
25 TABLET ORAL DAILY
Status: DISCONTINUED | OUTPATIENT
Start: 2021-12-03 | End: 2021-12-02 | Stop reason: HOSPADM

## 2021-12-02 RX ORDER — ESOMEPRAZOLE MAGNESIUM 40 MG/1
40 FOR SUSPENSION ORAL DAILY
Status: DISCONTINUED | OUTPATIENT
Start: 2021-12-02 | End: 2021-12-02

## 2021-12-02 RX ORDER — SODIUM CHLORIDE 0.9 % (FLUSH) 0.9 %
5-40 SYRINGE (ML) INJECTION PRN
Status: DISCONTINUED | OUTPATIENT
Start: 2021-12-02 | End: 2021-12-02 | Stop reason: HOSPADM

## 2021-12-02 RX ORDER — NITROGLYCERIN 0.4 MG/1
0.4 TABLET SUBLINGUAL EVERY 5 MIN PRN
Status: CANCELLED | OUTPATIENT
Start: 2021-12-02

## 2021-12-02 RX ORDER — DIPHENHYDRAMINE HCL 25 MG
50 TABLET ORAL ONCE
Status: CANCELLED | OUTPATIENT
Start: 2021-12-02 | End: 2021-12-02

## 2021-12-02 RX ORDER — LOSARTAN POTASSIUM AND HYDROCHLOROTHIAZIDE 12.5; 5 MG/1; MG/1
2 TABLET ORAL DAILY
Status: DISCONTINUED | OUTPATIENT
Start: 2021-12-03 | End: 2021-12-02 | Stop reason: HOSPADM

## 2021-12-02 RX ORDER — SODIUM CHLORIDE 0.9 % (FLUSH) 0.9 %
10 SYRINGE (ML) INJECTION PRN
Status: CANCELLED | OUTPATIENT
Start: 2021-12-02

## 2021-12-02 RX ORDER — SODIUM CHLORIDE 0.9 % (FLUSH) 0.9 %
5-40 SYRINGE (ML) INJECTION PRN
Status: CANCELLED | OUTPATIENT
Start: 2021-12-02

## 2021-12-02 RX ORDER — ALBUTEROL SULFATE 2.5 MG/3ML
2.5 SOLUTION RESPIRATORY (INHALATION) EVERY 4 HOURS PRN
Status: DISCONTINUED | OUTPATIENT
Start: 2021-12-02 | End: 2021-12-02 | Stop reason: HOSPADM

## 2021-12-02 RX ORDER — ONDANSETRON 4 MG/1
4 TABLET, ORALLY DISINTEGRATING ORAL EVERY 8 HOURS PRN
Status: DISCONTINUED | OUTPATIENT
Start: 2021-12-02 | End: 2021-12-02 | Stop reason: HOSPADM

## 2021-12-02 RX ORDER — DIPHENHYDRAMINE HCL 25 MG
50 CAPSULE ORAL ONCE
Status: DISCONTINUED | OUTPATIENT
Start: 2021-12-02 | End: 2021-12-02 | Stop reason: HOSPADM

## 2021-12-02 RX ORDER — SODIUM CHLORIDE 9 MG/ML
INJECTION, SOLUTION INTRAVENOUS CONTINUOUS
Status: CANCELLED | OUTPATIENT
Start: 2021-12-02

## 2021-12-02 RX ORDER — ONDANSETRON 2 MG/ML
4 INJECTION INTRAMUSCULAR; INTRAVENOUS EVERY 6 HOURS PRN
Status: DISCONTINUED | OUTPATIENT
Start: 2021-12-02 | End: 2021-12-02 | Stop reason: HOSPADM

## 2021-12-02 RX ORDER — ALBUTEROL SULFATE 90 UG/1
2 AEROSOL, METERED RESPIRATORY (INHALATION) EVERY 4 HOURS PRN
Status: DISCONTINUED | OUTPATIENT
Start: 2021-12-02 | End: 2021-12-02 | Stop reason: HOSPADM

## 2021-12-02 RX ORDER — ACETAMINOPHEN 650 MG/1
650 SUPPOSITORY RECTAL EVERY 6 HOURS PRN
Status: DISCONTINUED | OUTPATIENT
Start: 2021-12-02 | End: 2021-12-02 | Stop reason: HOSPADM

## 2021-12-02 RX ORDER — SODIUM CHLORIDE 0.9 % (FLUSH) 0.9 %
5-40 SYRINGE (ML) INJECTION EVERY 12 HOURS SCHEDULED
Status: CANCELLED | OUTPATIENT
Start: 2021-12-02

## 2021-12-02 RX ADMIN — IOPAMIDOL 85 ML: 755 INJECTION, SOLUTION INTRAVENOUS at 17:01

## 2021-12-02 RX ADMIN — SODIUM CHLORIDE: 9 INJECTION, SOLUTION INTRAVENOUS at 16:30

## 2021-12-02 NOTE — CONSULTS
Regina Jimenez am scribing for and in the presence of Amber Sanchez PA-C. Patient: Dequan Horton  : 1970  Date of Admission: 2021  Primary Care Physician: Chiquis Mccall  Today's Date: 2021    REASON FOR CONSULTATION: No chief complaint on file. Cardiac arrest any syncope    HPI: Mr. Milderd Kanner is a 54 y.o. male who was admitted to the hospital with a syncopal episode that happened on Tuesday leading to this consultation. He was getting his hair cut and ended up being unresponsive. He was looking at the floor and they found he didn't have a pulse. They laid him on the floor and they started CPR. He has a history of hypertension. He does have stenting in his RCA, 2 or 3 in  and another one in  with Dr. Silvano Alegria. At that time he says his symptoms were feeling run down without any chest pain or pressure but he did say he had some mild discomfort in his chest. He also reports a longstanding history of hypertension which has been on and off controlled.      He did have an EKG, echocardiogram, stress test and Holter monitor completed on 10/23/2021 that were all normal.     Mr. Milderd Kanner is sitting up in his chair right now and reports doing stable. He reports prior to the episode of passing out at the [facility] on Tuesday he did not have any chest pain, pressure, palpitations, or shortness of breath. He did not have any lightheadedness or dizziness associated with it. He states he was getting his haircut and when she turned him around in the chair to tell him that he was done he was slumped over in the chair and not responsive. He did receive a minute and a half of CPR. He does have sleep apnea and does not use his CPAP. Mr. Milderd Kanner also denied any current or recent chest pain, shortness of breath, lightheaded/dizziness or palpitations. No abdominal pain, bleeding problems, bowel issues, problems with his medications or any other concerns at this time. No cough, fever or chills. No nausea or vomiting. No cold sweats. No changes in his activity. Past Medical History:   Diagnosis Date    Arthritis     CAD (coronary artery disease)     COPD (chronic obstructive pulmonary disease) ([address]     Disc disorder     GERD (gastroesophageal reflux disease)     Hypertension     Hypertriglyceridemia     COLEMAN (obstructive sleep apnea)     DOES NOT USE HIS CPAP MACHINE AS DIRECTED    Pancreatitis 2013    Normal Ultrasound GB 2013       CURRENT ALLERGIES: Chantix [varenicline], Lipitor [atorvastatin], and Livalo [pitavastatin] REVIEW OF SYSTEMS: 14 systems were reviewed. Pertinent positives and negatives as above, all else negative.      Past Surgical History:   Procedure Laterality Date    CORONARY ANGIOPLASTY WITH STENT PLACEMENT  2004    Stent x 3 RCA    CORONARY ANGIOPLASTY WITH STENT PLACEMENT  03/27/2015    CORINA RCA   stents  total    DIAGNOSTIC CARDIAC CATH LAB PROCEDURE      HERNIA REPAIR  0819    umbilical    KNEE ARTHROSCOPY      right knee for ACL repair    KNEE ARTHROSCOPY Left 08/18/2016    chondroplasty , open excision baker cyst - Dr. Cristiana Franco Left 08/18/2016    Medial and lateral - Dr. Belgica Ashton PTCA      Social History:  Social History     Tobacco Use    Smoking status: Current Some Day Smoker     Packs/day: 1.00     Years: 30.00     Pack years: 30.00     Types: Cigarettes    Smokeless tobacco: Never Used   Vaping Use    Vaping Use: Never used   Substance Use Topics    Alcohol use: Not Currently     Alcohol/week: 3.0 standard drinks     Types: 3 Shots of liquor per week    Drug use: No        MEDICATIONS:  albuterol (PROVENTIL) nebulizer solution 2.5 mg, Q4H PRN  albuterol sulfate  (90 Base) MCG/ACT inhaler 2 puff, Q4H PRN  aspirin EC tablet 81 mg, Daily  [START ON 12/3/2021] carvedilol (COREG) tablet 25 mg, Daily  [START ON 12/3/2021] losartan-hydroCHLOROthiazide (HYZAAR) 50-12.5 MG per tablet 2 tablet, Daily  rivaroxaban (XARELTO) tablet 2.5 mg, BID  [START ON 12/3/2021] sertraline (ZOLOFT) tablet 25 mg, Daily  0.9 % sodium chloride infusion, Continuous  sodium chloride flush 0.9 % injection 5-40 mL, 2 times per day  sodium chloride flush 0.9 % injection 5-40 mL, PRN  0.9 % sodium chloride infusion, PRN  ondansetron (ZOFRAN-ODT) disintegrating tablet 4 mg, Q8H PRN   Or  ondansetron (ZOFRAN) injection 4 mg, Q6H PRN  polyethylene glycol (GLYCOLAX) packet 17 g, Daily PRN  acetaminophen (TYLENOL) tablet 650 mg, Q6H PRN   Or  acetaminophen (TYLENOL) suppository 650 mg, Q6H PRN  [START ON 12/3/2021] pantoprazole (PROTONIX) tablet 40 mg, QAM AC        CURRENT INPATIENT MEDICATIONS:  Prior to Admission medications    Medication Sig Start Date End Date Taking? Authorizing Provider   carvedilol (COREG) 25 MG tablet Take 1 tablet by mouth daily 11/15/21  Yes Stacy England MD   albuterol (PROVENTIL) (2.5 MG/3ML) 0.083% nebulizer solution Take 3 mLs by nebulization every 4 hours as needed for Wheezing or Shortness of Breath (dispense nebules) 11/9/21 11/9/22 Yes GILMA Reaves CNP   albuterol sulfate HFA (PROVENTIL HFA) 108 (90 Base) MCG/ACT inhaler Inhale 2 puffs into the lungs every 4 hours as needed for Wheezing or Shortness of Breath Generic albuterol is ok. 11/9/21 11/9/22 Yes GILMA Reaves CNP   sertraline (ZOLOFT) 25 MG tablet Take 1 tablet by mouth daily 8/26/21 8/26/22 Yes GILMA Reaves CNP   losartan-hydroCHLOROthiazide (HYZAAR) 100-25 MG per tablet Take 1 tablet by mouth daily 8/26/21  Yes GILMA Reaves CNP   Multiple Vitamins-Minerals (CENTRUM ADULTS PO) Take 1 tablet by mouth daily   Yes Historical Provider, MD   aspirin EC 81 MG EC tablet Take 1 tablet by mouth daily 7/2/17  Yes Julia Ross MD   esomeprazole Magnesium (NEXIUM) 40 MG PACK Take 40 mg by mouth daily.    Yes Historical Provider, MD   rivaroxaban (XARELTO) 2.5 MG TABS tablet Take 1 tablet by mouth 2 times daily 11/19/21 11/19/22  Dionna Chavis, APRN - greater than 1200 mg/dL. ALT 17 11/09/2021    AST 19 11/09/2021     11/09/2021    K 4.4 11/09/2021    CL 99 11/09/2021    CREATININE 0.92 11/09/2021    BUN 15 11/09/2021    CO2 24 11/09/2021    TSH 1.67 09/22/2021    PSA 0.25 09/22/2021    INR 1.1 10/23/2021    GLUF 147 (H) 09/22/2021    LABA1C 5.9 11/09/2021    LABMICR 24 (H) 05/22/2020    BNP 9 08/05/2013       ASSESSMENT:  Patient Active Problem List    Diagnosis Date Noted    Cardiac arrest ([facility] 12/02/2021    Hyponatremia 10/24/2021    Syncope and collapse     GITA (acute kidney injury) ([facility] 10/23/2021    Hypotensive episode 10/23/2021    Alcohol abuse 10/23/2021    Statin intolerance 11/26/2019    Encounter for current long term use of antiplatelet drug 15/93/2448    Acute pancreatitis from High Fat Meal 08/16/2019    Anxiety 10/23/2017    Acute bronchitis 07/02/2017    Unstable angina (HCC) 07/01/2017    Precordial chest pain 07/01/2017    Tobacco abuse 07/01/2017    COPD [facility]ch[facility] obstructive [facility] disease) ([facility])     Impaired fasting blood sugar 11/24/2015    Lumbar disc herniation with radiculopathy 09/26/2015    CAD (coronary artery disease) 03/27/2015    Presence of drug coated stent in right coronary artery 03/27/2015    S/P cardiac catheterization  3/27/2015 [facility] 03/27/2015    Hypertriglyceridemia 08/05/2013    Hypertension 08/05/2013    GERD (gastroesophageal reflux disease) 08/05/2013        PLAN:    Atherosclerotic Heart Disease: S/P Stenting in 2004 and then again in 2015. Antiplatelet Agent: Continue Aspirin 81 mg daily. Beta Blocker: Continue Carvedilol (Coreg) 25 mg twice daily. Anti-anginal medications: Not indicated at this time. Cholesterol Reduction Therapy: Not indicated at this time.   Additional Testing List: Additional Testing List: For these reasons, I recommended that the patient consider undergoing a cardiac catheterization with coronary angiography to assess their coronary anatomy and facilitate better treatment recommendations. I discussed the risks, benefits, and alternatives to the procedure including the risk of bleeding, contrast induced allergy and/or kidney damage, arrythmia, stroke, heart attack, death, or the need for further procedures. I also discussed the fact that although treatment with simple medical management is a potential treatment option in place of cardiac catheterization, I expressed my opinion that cardiac catheterization in order to define their coronary anatomy and rule out severe 3 vessel or left main coronary artery disease would significant help guide the most appropriate treatment strategy ranging from no treatment, to medications, stents, to even coronary bypass surgery. The patient verbalized understanding of the risks benefits and alternatives and stated that they would like to undergo the procedure. We will plan to schedule the procedure here at [facility] on 12/2/2021. · Syncope/near syncope of unknown etiology:  Second occurrence, no prodrome of symptoms  · Nonpharmacologic counseling: Because of his condition, I reminded him to try and keep himself well-hydrated and to take extra time when moving from laying to sitting, sitting to standing and standing to walking. I also explained to him to help improve his symptoms he should include 3 g sodium diet, 1 or 2 L of sports drinks daily, knee-high compressions stockings. ·  Additional Testing List: We discussed multiple testing and treatment options including a watch and wait approach, starting medication such as a beta blocker or calcium channel blocker in order to potentially decrease frequency and/or severity of symptoms, doing a 30 day event monitor, or the possibility of placing a [employer] LINQ loop recorder.   After discussing the risks and benefits of the different options, The patient said that they would prefer to proceed with placement of a [employer] LINQ loop recorder here at [facility]

## 2021-12-02 NOTE — PROGRESS NOTES
Patient arrived to floor via EMS cart. Patient assisted into the bathroom. Patient alert and oriented. Oriented to room. Informed Amenia Pancho that patient had arrived.

## 2021-12-02 NOTE — OP NOTE
-  Coronary Angiography Brief Post Operative Note:    Moderate three vessel coronary artery disease involving a 50% mid Cx stenosis, 40% proximal LAD stenosis and a distal 60% stenosis in the right coronary artery. Normal left ventricular end diastolic pressure. Continue standard risk factor modification as clinically indicated and consider alternative etiologies of the patients symptoms.    Electronically signed by Judith Jones MD on 12/2/2021 at 5:39 PM

## 2021-12-02 NOTE — PROGRESS NOTES
Informed by nursing supervisor that patient is being discharged home from cath lab. Belongings gathered and sent down to patient.

## 2021-12-02 NOTE — OP NOTE
OPERATIVE REPORT    Patient: Dottie Mireles   YOB: 1970       Attending: Nancy Cid M.D. Date of Surgery: 12/2/2021   PCP Phys:GILMA Bronson - CNP      SURGEON:  Nancy Cid M.D. PROCEDURE:  Medtronic LINQ loop recorder placement    INDICATION:  Cardiac arrest       NARRATIVE SUMMARY:  Mr. Marycruz Chacko was brought to the pre-operative waiting room and after explaining the risks, benefits and alternatives of the procedure and informed written consent was obtained. The patient was prepped and draped in the standard surgical fashion. After adequate sedation Marcaine was used to anesthetize the area over the planned loop recorder placement. An incision was made and once an adequate sized pocket was formed using a dilator. The device was placed subcutaneously in a 10 and 4 O'clock position with the device lettering facing up. Finally, the pocket was closed with demabond. Overall the patient tolerated the procedure well and there were no complications. Blood loss was minimal.    IMPLANTED DEVICE DETAIL:  Medtronic REVEAL LINQ loop recorder, [id number], serial number [id number]. Estimated Blood Loss: Less than 2 ml. Catrina Blancas. Sotero MCCANN, MS, F.A.C.C.   [facility]   [facility]  [facility] [address] [place] [address]  Phone: [phone], Fax: [phone]   Electronically signed by Albaro Finley MD on 12/2/2021 at 5:41 PM

## 2021-12-03 ENCOUNTER — CARE COORDINATION (OUTPATIENT)
Dept: OTHER | Facility: CLINIC | Age: 51
End: 2021-12-03

## 2021-12-03 ENCOUNTER — TELEPHONE (OUTPATIENT)
Dept: FAMILY MEDICINE CLINIC | Age: 51
End: 2021-12-03

## 2021-12-03 ENCOUNTER — APPOINTMENT (OUTPATIENT)
Dept: CARDIAC CATH/INVASIVE PROCEDURES | Age: 51
DRG: 260 | End: 2021-12-03
Attending: INTERNAL MEDICINE
Payer: COMMERCIAL

## 2021-12-03 NOTE — PROGRESS NOTES
Discharge instructions reviewed with patient and voices understanding. Disconnected from monitors and IV discontinued. Dressed for home. Ambulates within department unaided. Denies complaint.

## 2021-12-03 NOTE — TELEPHONE ENCOUNTER
Patient discharged from hospital yesterday. I already have all notes.    Please call and schedule hospital follow up

## 2021-12-03 NOTE — PROGRESS NOTES
Ambulates to ED entrance accompanied by staff, gait steady. All belongings sent with patient. Wife driving home in private car.

## 2021-12-06 ENCOUNTER — CARE COORDINATION (OUTPATIENT)
Dept: OTHER | Facility: CLINIC | Age: 51
End: 2021-12-06

## 2021-12-08 ENCOUNTER — OFFICE VISIT (OUTPATIENT)
Dept: CARDIOLOGY | Age: 51
End: 2021-12-08
Payer: COMMERCIAL

## 2021-12-08 VITALS
RESPIRATION RATE: 17 BRPM | DIASTOLIC BLOOD PRESSURE: 89 MMHG | SYSTOLIC BLOOD PRESSURE: 135 MMHG | BODY MASS INDEX: 31.04 KG/M2 | HEIGHT: 72 IN | WEIGHT: 229.2 LBS | HEART RATE: 73 BPM | OXYGEN SATURATION: 96 %

## 2021-12-08 DIAGNOSIS — E78.2 MIXED HYPERLIPIDEMIA: ICD-10-CM

## 2021-12-08 DIAGNOSIS — Z95.818 STATUS POST PLACEMENT OF IMPLANTABLE LOOP RECORDER: ICD-10-CM

## 2021-12-08 DIAGNOSIS — I10 PRIMARY HYPERTENSION: ICD-10-CM

## 2021-12-08 DIAGNOSIS — Z95.820 S/P ANGIOPLASTY WITH STENT: ICD-10-CM

## 2021-12-08 DIAGNOSIS — I25.10 ASHD (ARTERIOSCLEROTIC HEART DISEASE): Primary | ICD-10-CM

## 2021-12-08 DIAGNOSIS — R55 SYNCOPE, UNSPECIFIED SYNCOPE TYPE: ICD-10-CM

## 2021-12-08 PROCEDURE — 99213 OFFICE O/P EST LOW 20 MIN: CPT | Performed by: FAMILY MEDICINE

## 2021-12-08 NOTE — PATIENT INSTRUCTIONS
SURVEY:    You may be receiving a survey from [employer] regarding your visit today. Please complete the survey to enable us to provide the highest quality of care to you and your family. If you cannot score us a very good on any question, please call the office to discuss how we could have made your experience a very good one. Thank you.

## 2021-12-08 NOTE — PROGRESS NOTES
Laura Pearson am scribing for and in the presence of Edger Level. Sotero MCCANN, MS, F.A.C.C..    Patient: Josefina Downs  : 1970  Date of Visit: 2021    REASON FOR VISIT / CONSULTATION: Follow-up (Hx:CAD,S/P Stent,HTN,HLD.  LINQ Loop placed + cath - moderate three vessel disease. He has been doing okay so far. Can feel his heart skip a beat at times, causes him to cough - doesn't happen very often. Denies: CP, SOB, Lightheaded/dizziness. )    History of Present Illness:        Dear Damion Camacho, GILMA - CNP    I had the pleasure of seeing Josefina Downs in my office today. Mr. Calli Sanchez is a 54 y.o. male with a history of hypertension. He does have stenting in his RCA, 2 or 3 in  and another one in  with Dr. Bishop Patrick. At that time he says his symptoms were feeling run down without any chest pain or pressure but he did say he had some mild discomfort in his chest. He also reports a longstanding history of hypertension which has been on and off controlled. He recently was in the hospital due to having a syncopal episode. He was driving and started having bright white in his vision. He pulled over and his wife started driving. Once he got into the passenger seat and she started driving and it happened again. That was the first time it had ever happened and it has not happened since. He was told it could have happened due to dehydration. He underwent extensive testing at that time including a stress test, ECG and echocardiogram and all were largely unremarkable other than mild left ventricular hypertrophy. He had a second episode of passing out in 2021 at a [employer]. He did not have any chest pain, pressure, palpitations, or shortness of breath. He did not have any lightheadedness or dizziness associated with it. He states he was getting his haircut and when she turned him around in the chair to tell him that he was done he was slumped over in the chair and not responsive.   He did receive a minute and a half of CPR. Following this he underwent a LINQ Loop placed on 12/2/2021. Cardiac catheterization done 12/2/2021: moderate three vessel disease involving the LAD, circumflex and right coronary artery. Normal left ventricular end diastolic pressure. (LVEDP). Since I last saw Mr. Navarro Fairly he reports he has been doing okay so far with no recurrent episodes of syncope. He continues to feel his heart skip a beat at times and that causes him to cough. His pulse seems to increase throughout the day running in the low 100's. He continues to smoke. He states he drinks plenty of water, drinking roughly 160 ounces or more daily. He denies any chest pain, pressure or tightness now or in the recent past. He denies having any increased shortness of breath or lightheaded/dizziness. He denies having any abdominal pain, bleeding problems, bowel issues, problems with his medications or any other concerns at this time. No fever or chills. No nausea or vomiting. PAST MEDICAL HISTORY:        Past Medical History:   Diagnosis Date    Arthritis     CAD (coronary artery disease)     COPD (chronic obstructive pulmonary disease) ([facility] [address]     Disc disorder     GERD (gastroesophageal reflux disease)     Hypertension     Hypertriglyceridemia     COLEMAN (obstructive sleep apnea)     DOES NOT USE HIS CPAP MACHINE AS DIRECTED    Pancreatitis 2013    Normal Ultrasound GB 2013       CURRENT ALLERGIES: Chantix [varenicline], Lipitor [atorvastatin], and Livalo [pitavastatin] REVIEW OF SYSTEMS: 14 systems were reviewed. Pertinent positives and negatives as above, all else negative. Past Surgical History:   Procedure Laterality Date    CARDIAC CATHETERIZATION Left 12/02/2021    DR Bey/[facility] [place]/right radial-Moderate three vessel coronary artery disease involving a 50% mid Cx stenosis, 40% proximal LAD stenosis and a distal 60% stenosis in the right coronary artery.  Normal left ventricular end diastolic pressure. Continue standard risk factor modification as clinically indicated and consider alternative etiologies of the patients symptoms.  CORONARY ANGIOPLASTY WITH STENT PLACEMENT  2004    Stent x 3 RCA    CORONARY ANGIOPLASTY WITH STENT PLACEMENT  03/27/2015    CORINA RCA   stents  total    DIAGNOSTIC CARDIAC CATH LAB PROCEDURE      HERNIA REPAIR  3226    umbilical    KNEE ARTHROSCOPY      right knee for ACL repair    KNEE ARTHROSCOPY Left 08/18/2016    chondroplasty , open excision baker cyst - Dr. Benjamin Emery Left 08/18/2016    Medial and lateral - Dr. Leyda Hernandez PTCA      Social History:  Social History     Tobacco Use    Smoking status: Current Some Day Smoker     Packs/day: 1.00     Years: 30.00     Pack years: 30.00     Types: Cigarettes    Smokeless tobacco: Never Used   Vaping Use    Vaping Use: Never used   Substance Use Topics    Alcohol use: Not Currently     Alcohol/week: 3.0 standard drinks     Types: 3 Shots of liquor per week    Drug use: No        CURRENT MEDICATIONS:        Outpatient Medications Marked as Taking for the 12/8/21 encounter (Office Visit) with Solomon Kelly MD   Medication Sig Dispense Refill    rivaroxaban (Duane Dellen) 2.5 MG TABS tablet Take 1 tablet by mouth 2 times daily 60 tablet 0    rivaroxaban (XARELTO) 2.5 MG TABS tablet Take 1 tablet by mouth 2 times daily 60 tablet 11    carvedilol (COREG) 25 MG tablet Take 1 tablet by mouth daily 90 tablet 3    albuterol (PROVENTIL) (2.5 MG/3ML) 0.083% nebulizer solution Take 3 mLs by nebulization every 4 hours as needed for Wheezing or Shortness of Breath (dispense nebules) 100 each 1    albuterol sulfate HFA (PROVENTIL HFA) 108 (90 Base) MCG/ACT inhaler Inhale 2 puffs into the lungs every 4 hours as needed for Wheezing or Shortness of Breath Generic albuterol is ok.  1 each 2    sertraline (ZOLOFT) 25 MG tablet Take 1 tablet by mouth daily 90 tablet 1    losartan-hydroCHLOROthiazide (HYZAAR) 100-25 MG per tablet Take 1 tablet by mouth daily 90 tablet 1    Respiratory Therapy Supplies (NEBULIZER COMPRESSOR) KIT 1 kit by Does not apply route once for 1 dose 1 kit 0    Multiple Vitamins-Minerals (CENTRUM ADULTS PO) Take 1 tablet by mouth daily      esomeprazole Magnesium (NEXIUM) 40 MG PACK Take 40 mg by mouth daily. FAMILY HISTORY: family history includes Cancer in his maternal aunt; Heart Disease in his mother. Physical Examination:      /89 (Site: Left Upper Arm, Position: Sitting, Cuff Size: Medium Adult)   Pulse 73   Resp 17   Ht 6' (1.829 m)   Wt 229 lb 3.2 oz (104 kg)   SpO2 96%   BMI 31.09 kg/m²  Body mass index is 31.09 kg/m². Constitutional: He is oriented to person. He appears well-developed and well-nourished. In no acute distress. HEENT: Normocephalic and atraumatic. No JVD present. Carotid bruit is not present. No mass and no thyromegaly present. No lymphadenopathy present. Cardiovascular: Normal rate, regular rhythm, normal heart sounds. Exam reveals no gallop and no friction rubs. 1/6 systolic murmur, 5th intercostal space on the LEFT in the mid-clavicular line (cardiac apex). Incision site well healing from Loop recorder placement. Pulmonary/Chest: Effort normal and breath sounds normal. No respiratory distress. He has no wheezes, rhonchi or rales. Abdominal: Soft, non-tender. Bowel sounds and aorta are normal. He exhibits no organomegaly, mass or bruit. Extremities: Trace. No cyanosis or clubbing. 2+ radial and carotid pulses. Distal extremity pulses: 2+ bilaterally. .  Neurological: He is alert and oriented to person. No evidence of gross cranial nerve deficit. Coordination appeared normal.   Skin: Skin is warm and dry. There is no rash or diaphoresis. Psychiatric: He has a normal mood and affect.  His speech is normal and behavior is normal.      MOST RECENT LABS ON RECORD:   Lab Results   Component Value Date    WBC 7.3 11/09/2021    HGB 15.0 11/09/2021    HCT 44.7 11/09/2021     11/09/2021    CHOL 164 09/22/2021    TRIG 145 09/22/2021    HDL 76 09/22/2021    LDLDIRECT  08/05/2013     Unable to report LDL Direct due to Triglycerides greater than 1200 mg/dL. ALT 17 11/09/2021    AST 19 11/09/2021     11/09/2021    K 4.4 11/09/2021    CL 99 11/09/2021    CREATININE 0.92 11/09/2021    BUN 15 11/09/2021    CO2 24 11/09/2021    TSH 1.67 09/22/2021    PSA 0.25 09/22/2021    INR 1.1 10/23/2021    GLUF 147 (H) 09/22/2021    LABA1C 5.9 11/09/2021    LABMICR 24 (H) 05/22/2020    BNP 9 08/05/2013       ASSESSMENT:     1. ASHD (arteriosclerotic heart disease)    2. S/P angioplasty with stent    3. Syncope, unspecified syncope type    4. Status post placement of implantable loop recorder    5. Primary hypertension    6. Mixed hyperlipidemia       PLAN:         Atherosclerotic Heart Disease: S/P Stenting x4 in 2004 & 2015  Antiplatelet Agent: Continue Aspirin 81 mg daily. and  STOP clopidogrel (Plavix)   Based on the patients increased risk of future cardiovascular events, I discussed the risks and benefits of starting him on Xarelto 2.5 mg bid. I explained that as per the [facility] trial published in the [address] in 2017, treatment in patients with a known history of severe coronary artery disease had a 22% relative risk reduction in CV death, a 14% relative risk reduction in MI, and a 42% relative risk reduction in stroke. I also explained that the this will increase his risk of bleeding which can rarely be fatal but told them that they needed to watch for any blood in their stool or black tarry stools and call immediately if this develops. The patient was in agreement with this plan and therefore Continue Xarelto 2.5 mg BID.  Beta Blocker: Continue Carvedilol (Coreg) 25 mg twice daily.  Anti-anginal medications: Not indicated at this time.  Cholesterol Reduction Therapy: Not indicated at this time.    Additional counseling: I advised them to call our office or go to the emergency room if they developed worsening or persistent chest pain or increased shortness of breath as this could be life threatening. Syncope/near syncope of unknown etiology: First time occurrence and had not reoccurred since   · Nonpharmacologic counseling: Because of his condition, I reminded him to try and keep himself well-hydrated and to take extra time when moving from laying to sitting, sitting to standing and standing to walking. I also explained to him to help improve his symptoms he should include 3 g sodium diet, 1 or 2 L of sports drinks daily, knee-high compressions stockings. · Loop recorder in place, no reports downloaded. He states he has the machine at home and it appears to be set up correctly, we will follow up on this.  Essential Hypertension: Borderline controlled  · ACE Inibitor/ARB: Continue losartan/HCTZ (Hyzaar) 100/25 mg every morning. · Diuretics: Continue losartan/HCTZ (Hyzaar) 100/25 mg every morning. · Calcium Channel Blocker: Not indicated at this time. · Beta Blocker: Continue Carvedilol (Coreg) 25 mg twice daily. · Hyperlipidemia: Mixed - Last LDL on 9/22/2021 was 59 mg/dL   · Cholesterol Reduction Therapy: Not indicated at this time. Intolerance to statins - he was not interested in starting Zetia at this time. · LDL at goal     In the meantime, I encouraged Mr. Calli Sanchez to continue to take his other medications. FOLLOW UP:   I told Mr. Calli Sanchez to call my office if he had any problems, but otherwise I asked him to Return in about 1 year (around 12/8/2022). However, I would be happy to see him sooner should the need arise. Sincerely,  Ronaldo Bey MD, MS, F.A.C.C.   [facility]    [address], [place], [address] [place] [address]  Phone: [phone], Fax: [phone]     I believe that the risk of significant morbidity and mortality related to the patient's current medical conditions are: low-intermediate. The documentation recorded by the scribe, accurately and completely reflects the services I personally performed and the decisions made by me. Chrissie Mcfadden MD, MS, F.A.C.C.  December 8, 2021

## 2021-12-10 ENCOUNTER — CARE COORDINATION (OUTPATIENT)
Dept: OTHER | Facility: CLINIC | Age: 51
End: 2021-12-10

## 2021-12-10 NOTE — CARE COORDINATION
[facility] Transitions Follow Up Call    12/10/2021    Patient: Za Vann  Patient : 1970   MRN: [id number]  Reason for Admission: syncope   Discharge Date: 21 RARS: Readmission Risk Score: 11.8 ( )    Transitions of Care Initial Call    Was this an external facility discharge? No Discharge Facility: [facility]    Challenges to be reviewed by the provider   Additional needs identified to be addressed with provider: Yes  medications-Pt needs clarification on how to take Xarelto. He thought he was to take 2 tablets every morning. Script states 1 tablet 2 times per day             Method of communication with provider : chart routing      Advance Care Planning:   Does patient have an Advance Directive: not on file. Was this a readmission? No  Patient stated reason for admission: syncope episode  Patients top risk factors for readmission: ineffective coping  medical condition-ASHD, s/p angioplasty with stent, sycope, hypertension, hyperlipidemia    Care Transition Nurse (CTN) contacted the patient by telephone to perform post hospital discharge assessment. Verified name and  with patient as identifiers. Provided introduction to self, and explanation of the CTN role. Patient states he is feeling good, denies any recent episodes of syncope, or palpitations. Pt denies any chest pain, shortness of breath, s/sx of infection. Pt has some questions in how to take xarelto; ctn to route chart to pcp requesting clarification. ACM discussed 3 gram sodium diet with patient and pt states he did not know he was supposed to be on this; will clarify with pcp. Pt states BP was 120/76 and pulse 102 today; pt states pulse has been running 100-120. Pt states wife is a doctor. Pt had some questions regarding how to transmit information from loop recorder to specialist. CTN educated pt and found video and sent link to video to patient via My Chart.      CTN reviewed discharge instructions, medical action plan and red flags with patient who verbalized understanding. Patient given an opportunity to ask questions and does not have any further questions or concerns at this time. Were discharge instructions available to patient? Yes. Reviewed appropriate site of care based on symptoms and resources available to patient including: PCP, Specialist, Benefits related nurse triage line, When to call 911 and [address]. The patient agrees to contact the PCP office for questions related to their healthcare. Medication reconciliation was performed with patient, who verbalizes understanding of administration of home medications. Advised obtaining a 90-day supply of all daily and as-needed medications. Covid Risk Education     Educated patient about risk for severe COVID-19 due to risk factors according to CDC guidelines. CTN reviewed discharge instructions, medical action plan and red flag symptoms with the patient who verbalized understanding. Discussed COVID vaccination status: Yes and has had 2 doses of Covid vaccine. Education provided on COVID-19 vaccination as appropriate. Discussed exposure protocols and quarantine with CDC Guidelines. Patient was given an opportunity to verbalize any questions and concerns and agrees to contact CTN or health care provider for questions related to their healthcare. Reviewed and educated patient on any new and changed medications related to discharge diagnosis. CTN provided contact information. Plan for follow-up call in 3-5 days based on severity of symptoms and risk factors.   Plan for next call: symptom management-any further syncopal episodes, palpitations  self management-using [employer] loop system correctly to transmit data to specialist  follow up appointment-make appt with PCP          Care Transitions Subsequent and Final Call    Schedule Follow Up Appointment with PCP: Completed  Subsequent and Final Calls  Do you have any ongoing symptoms?: No  Have your medications changed?: No  Do you have any questions related to your medications?: No  Do you currently have any active services?: No  Do you have any needs or concerns that I can assist you with?: Yes  Patient-reported Needs or Concerns: I am not sure how to use this recorder that I have. Identified Barriers: Stress  Care Transitions Interventions     Other Services: Completed (Comment: Education on Medtronic Loop recorder sent to pt via My Chart)   Other Interventions: Follow Up  Future Appointments   Date Time Provider Amparo Rowe   1/18/2022  8:00 AM SCHEDULE, [facility]   6/8/2022  9:00 AM Mariola Bowman PA-C TIFF CARD [facility]   11/21/2022 10:20 AM Uma Mc MD TIFF CARD [facility]       Wanda Evangelista MSN, RN   Ambulatory Care Manager  Associate Care Management  Cell [phone]  [email or web address]

## 2021-12-12 NOTE — TELEPHONE ENCOUNTER
I agree with the [address] is ordered 2.5 mg BID  Cardiology notes reviewed, no sodium restriction was discussed.

## 2021-12-13 ENCOUNTER — CARE COORDINATION (OUTPATIENT)
Dept: OTHER | Facility: CLINIC | Age: 51
End: 2021-12-13

## 2021-12-13 NOTE — CARE COORDINATION
[facility] Transitions Follow Up Call    2021    Patient: Floyce Cranker  Patient : 1970   MRN: [id number]  Reason for Admission: Syncope   Discharge Date: 21 RARS: Readmission Risk Score: 11.8 ( )    ACM attempted to reach patient for follow up call regarding care transitions, discharge 21. HIPAA compliant message left requesting a return phone call at patients convenience. ACM sent link to patient education video for how to use the [employer] Linq, My [facility] Monitor system. Will continue to follow. Follow Up  Future Appointments   Date Time Provider Amparo Rowe   2022  8:00 AM SCHEDULE, [facility] [employer] TIFF CARD [facility]   2022  9:00 AM Jennifer Moseley PA-C TIFF CARD [facility]   2022 10:20 AM Netta Leyden, MD TIFF CARD [facility]       Ramu ROBERSON, RN   Ambulatory Care Manager  Associate Care Management  Cell [phone]  [email or web address]

## 2021-12-20 ENCOUNTER — CARE COORDINATION (OUTPATIENT)
Dept: OTHER | Facility: CLINIC | Age: 51
End: 2021-12-20

## 2021-12-20 NOTE — CARE COORDINATION
[facility] Transitions Follow Up Call    2021    Patient: Farhana Kurtz  Patient : 1970   MRN: [id number]  Reason for Admission: syncope  Discharge Date: 21 RARS: Readmission Risk Score: 11.8 ( )  ACM attempted to reach patient for follow up call regarding discharge 21. HIPAA compliant message left requesting a return phone call at patients convenience. Unable to Reach Letter sent to patient via My Chart. Will continue to outreach patient. Follow Up  Future Appointments   Date Time Provider Amparo Rowe   2022  8:00 AM SCHEDULE, [facility] [employer] TIFF CARD [facility]   2022  9:00 AM Finesse Soto PA-C TIFF CARD [facility]   2022 10:20 AM Luz Rivera MD TIFF CARD [facility]       Carole Strauss MSN, RN   Ambulatory Care Manager  Associate Care Management  Cell [phone]  [email or web address]

## 2022-01-04 ENCOUNTER — CARE COORDINATION (OUTPATIENT)
Dept: OTHER | Facility: CLINIC | Age: 52
End: 2022-01-04

## 2022-01-04 NOTE — CARE COORDINATION
[facility] Trans[facility] Follow Up Call    2022    Patient: Sergio Vargas  Patient : 1970   MRN: [id number]  Reason for Admission: syncope  Discharge Date: 21 RARS: Readmission Risk Score: 11.8 ( )    Spoke with: Patient    Care Transitions Follow Up Call    Needs to be reviewed by the provider   Additional needs identified to be addressed with provider: No  none             Method of communication with provider : none      Care Transition Nurse (CTN) contacted the patient by telephone to follow up after admission on 21 for syncope. Verified name and  with patient as identifiers. Addressed changes since last contact: Pt currently has Covid  Discussed follow-up appointments. If no appointment was previously scheduled, appointment scheduling offered: Yes and pt declined, as he states he needs to coordinate his work schedule and his wife and kids schedules. Pt states he tested positive with a home test for Covid on 21; reports has very mild symptoms - mild nasal congestion and mucous that he sneezes out is clear; denies any current fever, cough, shortness of breath, nausea, vomiting, diarrhea, chest pain; no loss of taste/smell. Pt reports that he felt a little bad with low grade fever for one day. CTN educated pt of CDC guidelines for quarantine and pt has been quarantine at home; daughter also had Covid; wife who is a physician did not test positive. Educated pt on Covid red flags and when to see further care and pt verbalized understanding. Pt denies any recent episodes of syncope, lightheadedness, tachycardia or chest pain. Pt not having any issues with loop recorder; no s/sx of infection at insertion site. CTN educated pt of [employer] customer service number for loop recorder if he has further questions or issues. Pt due to have loop recorder checked by Cardiology office on 22; CTN reminded pt of this appt.  Pt denied any further care management needs; denies any concerns or questions. Pt does have this CTN contact information for future if needs arise. No further outreach scheduled with this CTN. CTN will sign off care team at this time. Advance Care Planning:   Does patient have an Advance Directive: not on file. CTN reviewed discharge instructions, medical action plan and red flags with patient and discussed any barriers to care and/or understanding of plan of care after discharge. Discussed appropriate site of care based on symptoms and resources available to patient including: PCP, Specialist, Benefits related nurse triage line, When to call 911 and [address]. The patient agrees to contact the PCP office for questions related to their healthcare. Patients top risk factors for readmission: ineffective coping  medical condition-ASHD, s/p angioplasty with stent, sycope, hypertension, hyperlipidemia  Interventions to address risk factors: Obtained and reviewed discharge summary and/or continuity of care documents, Education of patient/family/caregiver/guardian to support self-management-educated on Red flags and appropriate sites of care; educated on loop recorder device; pt verbalized understanding and Assessment and support for treatment adherence and medication management-reviewed meds with patient and answered questions; pt states is taking meds as prescribed      [facility] follow up appointment(s): none known    CTN provided contact information for future needs. No further outreach scheduled with this CTN based on severity of symptoms and risk factors. Pt has no further needs, questions, concerns at this time. No further outreach scheduled with this ACM, ACM will sign off care team at this time. Episode of Care resolved. Patient has this ACM's contact information if future needs arise.       Care Transitions Subsequent and Final Call    Subsequent and Final Calls  Do you have any ongoing symptoms?: No  Have your medications changed?: No  Do you have any questions related to your medications?: No  Do you currently have any active services?: No  Do you have any needs or concerns that I can assist you with?: No  Identified Barriers: None  Care Transitions Interventions     Other Services: Completed (Comment: Education on [employer] Loop recorder sent to pt via My Chart)   Other Interventions:         Goals      Conditions and Symptoms      I will schedule office visits, as directed by my provider. I will keep my appointment or reschedule if I have to cancel. I will notify my provider of any barriers to my plan of care. I will notify my provider of any symptoms that indicate a worsening of my condition. Barriers: time constraints and lack of education  Plan for overcoming my barriers: Work with ACM  Confidence: 9/10  Anticipated Goal Completion Date: 1/10/22    12/10/21 - Pt to make follow up appt with PCP.  1/4/22 - Pt still needs to make appt with PCP. CTN offered to make appt for pt, but pt declined. Reminded pt he has appt on 1/18/22 for loop recorder, device check. Follow Up  Future Appointments   Date Time Provider Amparo Rowe   1/18/2022  8:00 AM SCHEDULE, RICHIE JASSOFIN CAR [employer] TIFF CARD [facility]   6/8/2022  9:00 AM Екатерина Wood PA-C TIFF CARD [facility]   11/21/2022 10:20 AM Xiomara Espinoza MD TIFF CARD [facility]       Mitch ROBERSON, RN   Ambulatory Care Manager  Associate Care Management  Cell [phone]  [email or web address]

## 2022-01-12 ENCOUNTER — NURSE ONLY (OUTPATIENT)
Dept: CARDIOLOGY | Age: 52
End: 2022-01-12
Payer: COMMERCIAL

## 2022-01-12 DIAGNOSIS — Z95.818 STATUS POST PLACEMENT OF IMPLANTABLE LOOP RECORDER: ICD-10-CM

## 2022-01-12 DIAGNOSIS — Z45.09 ENCOUNTER FOR LOOP RECORDER CHECK: ICD-10-CM

## 2022-01-12 DIAGNOSIS — R55 SYNCOPE, UNSPECIFIED SYNCOPE TYPE: Primary | ICD-10-CM

## 2022-01-12 PROCEDURE — 93298 REM INTERROG DEV EVAL SCRMS: CPT | Performed by: FAMILY MEDICINE

## 2022-02-24 ENCOUNTER — NURSE ONLY (OUTPATIENT)
Dept: CARDIOLOGY | Age: 52
End: 2022-02-24
Payer: COMMERCIAL

## 2022-02-24 DIAGNOSIS — R55 SYNCOPE, UNSPECIFIED SYNCOPE TYPE: Primary | ICD-10-CM

## 2022-02-24 DIAGNOSIS — Z45.09 ENCOUNTER FOR LOOP RECORDER CHECK: ICD-10-CM

## 2022-02-24 PROCEDURE — 93298 REM INTERROG DEV EVAL SCRMS: CPT | Performed by: FAMILY MEDICINE

## 2022-02-25 DIAGNOSIS — F41.9 ANXIETY: ICD-10-CM

## 2022-02-25 NOTE — TELEPHONE ENCOUNTER
disease)     CAD (coronary artery disease)     Presence of drug coated stent in right coronary artery     S/P cardiac catheterization  3/27/2015 [facility]     Lumbar disc herniation with radiculopathy     Impaired fasting blood sugar     Unstable angina (HCC)     COPD (chronic obstructive pulmonary disease) (HCC)     Precordial chest pain     Tobacco abuse     Acute bronchitis     Anxiety     Acute pancreatitis from High Fat Meal     Statin intolerance     Encounter for current long term use of antiplatelet drug     GITA (acute kidney injury) ([facility] [address]     Hypotensive episode     Alcohol abuse     Hyponatremia     Syncope and collapse     Cardiac arrest ([address]

## 2022-02-26 RX ORDER — SERTRALINE HYDROCHLORIDE 25 MG/1
TABLET, FILM COATED ORAL
Qty: 90 TABLET | Refills: 1 | Status: SHIPPED | OUTPATIENT
Start: 2022-02-26 | End: 2023-02-26

## 2022-05-17 PROCEDURE — 93298 REM INTERROG DEV EVAL SCRMS: CPT | Performed by: FAMILY MEDICINE

## 2022-05-24 ENCOUNTER — NURSE ONLY (OUTPATIENT)
Dept: CARDIOLOGY | Age: 52
End: 2022-05-24

## 2022-05-24 DIAGNOSIS — Z45.09 ENCOUNTER FOR LOOP RECORDER CHECK: ICD-10-CM

## 2022-05-24 DIAGNOSIS — R55 SYNCOPE, UNSPECIFIED SYNCOPE TYPE: Primary | ICD-10-CM

## 2022-05-24 PROCEDURE — 93298 REM INTERROG DEV EVAL SCRMS: CPT | Performed by: FAMILY MEDICINE

## 2022-05-24 RX ORDER — RIVAROXABAN 2.5 MG/1
2.5 TABLET, FILM COATED ORAL 2 TIMES DAILY
Qty: 180 TABLET | Refills: 3 | Status: SHIPPED | OUTPATIENT
Start: 2022-05-24

## 2022-05-31 ENCOUNTER — NURSE ONLY (OUTPATIENT)
Dept: CARDIOLOGY | Age: 52
End: 2022-05-31
Payer: COMMERCIAL

## 2022-05-31 DIAGNOSIS — R55 SYNCOPE, UNSPECIFIED SYNCOPE TYPE: Primary | ICD-10-CM

## 2022-05-31 DIAGNOSIS — Z45.09 ENCOUNTER FOR LOOP RECORDER CHECK: ICD-10-CM

## 2022-06-28 ENCOUNTER — E-VISIT (OUTPATIENT)
Dept: FAMILY MEDICINE CLINIC | Age: 52
End: 2022-06-28
Payer: COMMERCIAL

## 2022-06-28 DIAGNOSIS — S29.012A MUSCLE STRAIN OF UPPER BACK: Primary | ICD-10-CM

## 2022-06-28 PROCEDURE — 99423 OL DIG E/M SVC 21+ MIN: CPT | Performed by: NURSE PRACTITIONER

## 2022-06-28 PROCEDURE — 93298 REM INTERROG DEV EVAL SCRMS: CPT | Performed by: FAMILY MEDICINE

## 2022-06-28 RX ORDER — PREDNISONE 10 MG/1
TABLET ORAL
Qty: 30 TABLET | Refills: 0 | Status: SHIPPED | OUTPATIENT
Start: 2022-06-28 | End: 2022-07-13

## 2022-06-28 RX ORDER — BACLOFEN 10 MG/1
10 TABLET ORAL 3 TIMES DAILY PRN
Qty: 30 TABLET | Refills: 0 | Status: SHIPPED | OUTPATIENT
Start: 2022-06-28

## 2022-07-05 ENCOUNTER — NURSE ONLY (OUTPATIENT)
Dept: CARDIOLOGY | Age: 52
End: 2022-07-05
Payer: COMMERCIAL

## 2022-07-05 DIAGNOSIS — R55 SYNCOPE, UNSPECIFIED SYNCOPE TYPE: ICD-10-CM

## 2022-07-05 DIAGNOSIS — Z45.09 ENCOUNTER FOR LOOP RECORDER CHECK: Primary | ICD-10-CM

## 2022-09-13 PROCEDURE — 93298 REM INTERROG DEV EVAL SCRMS: CPT | Performed by: FAMILY MEDICINE

## 2022-09-19 ENCOUNTER — TELEPHONE (OUTPATIENT)
Dept: FAMILY MEDICINE CLINIC | Age: 52
End: 2022-09-19

## 2022-09-19 DIAGNOSIS — F41.9 ANXIETY: Primary | ICD-10-CM

## 2022-09-19 RX ORDER — LORAZEPAM 1 MG/1
1 TABLET ORAL EVERY 8 HOURS PRN
Qty: 21 TABLET | Refills: 0 | Status: SHIPPED | OUTPATIENT
Start: 2022-09-19 | End: 2022-09-26

## 2022-09-20 ENCOUNTER — NURSE ONLY (OUTPATIENT)
Dept: CARDIOLOGY | Age: 52
End: 2022-09-20
Payer: COMMERCIAL

## 2022-09-20 DIAGNOSIS — Z45.09 ENCOUNTER FOR LOOP RECORDER CHECK: Primary | ICD-10-CM

## 2022-09-20 DIAGNOSIS — R55 SYNCOPE, UNSPECIFIED SYNCOPE TYPE: ICD-10-CM

## 2022-09-27 RX ORDER — CARVEDILOL 25 MG/1
25 TABLET ORAL DAILY
Qty: 90 TABLET | Refills: 3 | Status: SHIPPED | OUTPATIENT
Start: 2022-09-27

## 2022-10-20 NOTE — RESULT ENCOUNTER NOTE
Response sent in [address] Please ensure patient is aware of results/response. Xenograft Text: Because of the size and depth of the defect and to facilitate healing by granulation, a tissue-cultured epidermal autograft was planned.  After prep and local anesthesia, Xenograft material was trimmed to fi the defect and secured

## 2022-12-03 DIAGNOSIS — F41.9 ANXIETY: ICD-10-CM

## 2022-12-04 DIAGNOSIS — F41.9 ANXIETY: ICD-10-CM

## 2022-12-05 RX ORDER — SERTRALINE HYDROCHLORIDE 25 MG/1
25 TABLET, FILM COATED ORAL DAILY
Qty: 90 TABLET | Refills: 1 | Status: SHIPPED | OUTPATIENT
Start: 2022-12-05 | End: 2023-06-03

## 2022-12-05 RX ORDER — SERTRALINE HYDROCHLORIDE 25 MG/1
TABLET, FILM COATED ORAL
Qty: 90 TABLET | Refills: 1 | OUTPATIENT
Start: 2022-12-05 | End: 2023-12-05

## 2022-12-05 NOTE — TELEPHONE ENCOUNTER
LOV 11/8/21  RTO 3 months  LRF 2/26/22    Health Maintenance   Topic Date Due    Hepatitis C screen  Never done    Colorectal Cancer Screen  Never done    COVID-19 Vaccine (3 - Booster for Pfizer series) 12/10/2021    Shingles vaccine (1 of 2) Never done    Flu vaccine (1) 08/01/2022    Depression Screen  08/26/2022    A1C test (Diabetic or Prediabetic)  11/09/2022    Lipids  09/22/2026    DTaP/Tdap/Td vaccine (2 - Td or Tdap) 10/23/2027    Pneumococcal 0-64 years Vaccine  Completed    HIV screen  Completed    Hepatitis A vaccine  Aged Out    Hib vaccine  Aged Out    Meningococcal (ACWY) vaccine  Aged Out             (applicable per patient's age: Cancer Screenings, Depression Screening, Fall Risk Screening, Immunizations)    Hemoglobin A1C (%)   Date Value   11/09/2021 5.9   09/22/2021 6.0   05/22/2020 6.1 (H)     Microalb/Crt.  Ratio (mcg/mg creat)   Date Value   05/22/2020 24 (H)     LDL Cholesterol (mg/dL)   Date Value   09/22/2021 59     AST (U/L)   Date Value   11/09/2021 19     ALT (U/L)   Date Value   11/09/2021 17     BUN (mg/dL)   Date Value   11/09/2021 15      (goal A1C is < 7)   (goal LDL is <100) need 30-50% reduction from baseline     BP Readings from Last 3 Encounters:   12/08/21 135/89   12/02/21 (!) 170/101   12/02/21 (!) 169/104    (goal /80)      All Future Testing planned in [facility]:  Lab Frequency Next Occurrence       Next Visit Date:  Future Appointments   Date Time Provider Amparo Rowe   12/7/2022  9:00 AM Kelechi Huggins MD TIFF CARD [facility]   12/20/2022  8:00 AM SCHEDULE, P TIFFIN [facility] [employer] TIFF CARD [facility]            Patient Active Problem List:     Hypertriglyceridemia     Hypertension     GERD (gastroesophageal reflux disease)     CAD (coronary artery disease)     Presence of drug coated stent in right coronary artery     S/P cardiac catheterization  3/27/2015 [facility]     Lumbar disc herniation with radiculopathy     Impaired fasting blood sugar     Unstable angina ([address] COPD (chronic obstructive pulmonary disease) (HCC)     Precordial chest pain     Tobacco abuse     Acute bronchitis     Anxiety     Acute pancreatitis from High Fat Meal     Statin intolerance     Encounter for current long term use of antiplatelet drug     GITA (acute kidney injury) ([address]     Hypotensive episode     Alcohol abuse     Hyponatremia     Syncope and collapse     Cardiac arrest ([address]

## 2022-12-07 ENCOUNTER — OFFICE VISIT (OUTPATIENT)
Dept: CARDIOLOGY | Age: 52
End: 2022-12-07
Payer: COMMERCIAL

## 2022-12-07 VITALS
SYSTOLIC BLOOD PRESSURE: 168 MMHG | HEIGHT: 72 IN | OXYGEN SATURATION: 99 % | HEART RATE: 86 BPM | WEIGHT: 235 LBS | RESPIRATION RATE: 18 BRPM | DIASTOLIC BLOOD PRESSURE: 87 MMHG | BODY MASS INDEX: 31.83 KG/M2

## 2022-12-07 DIAGNOSIS — Z45.09 ENCOUNTER FOR LOOP RECORDER CHECK: ICD-10-CM

## 2022-12-07 DIAGNOSIS — Z95.820 S/P ANGIOPLASTY WITH STENT: ICD-10-CM

## 2022-12-07 DIAGNOSIS — I10 ESSENTIAL HYPERTENSION: Primary | ICD-10-CM

## 2022-12-07 DIAGNOSIS — I51.7 MILD LEFT VENTRICULAR HYPERTROPHY: ICD-10-CM

## 2022-12-07 DIAGNOSIS — Z87.898 HISTORY OF SYNCOPE: ICD-10-CM

## 2022-12-07 DIAGNOSIS — I25.10 CORONARY ARTERY DISEASE INVOLVING NATIVE CORONARY ARTERY OF NATIVE HEART WITHOUT ANGINA PECTORIS: ICD-10-CM

## 2022-12-07 DIAGNOSIS — E78.2 MIXED HYPERLIPIDEMIA: ICD-10-CM

## 2022-12-07 PROCEDURE — 93000 ELECTROCARDIOGRAM COMPLETE: CPT | Performed by: FAMILY MEDICINE

## 2022-12-07 PROCEDURE — 3074F SYST BP LT 130 MM HG: CPT | Performed by: FAMILY MEDICINE

## 2022-12-07 PROCEDURE — 3078F DIAST BP <80 MM HG: CPT | Performed by: FAMILY MEDICINE

## 2022-12-07 PROCEDURE — 99214 OFFICE O/P EST MOD 30 MIN: CPT | Performed by: FAMILY MEDICINE

## 2022-12-07 RX ORDER — NEBIVOLOL 10 MG/1
10 TABLET ORAL DAILY
Qty: 90 TABLET | Refills: 3 | Status: SHIPPED | OUTPATIENT
Start: 2022-12-07

## 2022-12-07 RX ORDER — LOSARTAN POTASSIUM AND HYDROCHLOROTHIAZIDE 12.5; 5 MG/1; MG/1
1 TABLET ORAL DAILY
Qty: 90 TABLET | Refills: 3 | Status: SHIPPED | OUTPATIENT
Start: 2022-12-07

## 2022-12-07 RX ORDER — ASPIRIN 81 MG/1
81 TABLET ORAL DAILY
Qty: 90 TABLET | Refills: 3 | Status: SHIPPED | OUTPATIENT
Start: 2022-12-07

## 2022-12-07 RX ORDER — LOSARTAN POTASSIUM AND HYDROCHLOROTHIAZIDE 25; 100 MG/1; MG/1
1 TABLET ORAL DAILY
Qty: 90 TABLET | Refills: 1 | Status: CANCELLED | OUTPATIENT
Start: 2022-12-07

## 2022-12-07 NOTE — PROGRESS NOTES
Alycia Adam am scribing for and in the presence of Shyann Bey MD, MS, F.A.C.C..    Patient: Nathanael Santiago  : 1970  Date of Visit: 2022    REASON FOR VISIT / CONSULTATION: Follow-up (HX: CAD S/p Stent, Syncope, HTN. Pt states he is doing well. Denies: CP, Palpations, Lightheaded/ dizziness, SOB.)    History of Present Illness:        Dear Irvin , APRN - CNP    I had the pleasure of seeing Nathanael Santiago in my office today. Mr. Stephanie Mera is a 50 y.o. male with a history of hypertension. He does have stenting in his RCA, 2 or 3 in  and another one in  with Dr. Nadine Barker. At that time he says his symptoms were feeling run down without any chest pain or pressure but he did say he had some mild discomfort in his chest. He also reports a longstanding history of hypertension which has been on and off controlled. He recently was in the hospital due to having a syncopal episode. He was driving and started having bright white in his vision. He pulled over and his wife started driving. Once he got into the passenger seat and she started driving and it happened again. That was the first time it had ever happened and it has not happened since. He was told it could have happened due to dehydration. He underwent extensive testing at that time including a stress test, ECG and echocardiogram and all were largely unremarkable other than mild left ventricular hypertrophy. He had a second episode of passing out in 2021 at a barbershop. He did not have any chest pain, pressure, palpitations, or shortness of breath. He did not have any lightheadedness or dizziness associated with it. He states he was getting his haircut and when she turned him around in the chair to tell him that he was done he was slumped over in the chair and not responsive. He did receive a minute and a half of CPR. Following this he underwent a LINQ Loop placed on 2021.  Cardiac catheterization done 2021: moderate three vessel disease involving the LAD, circumflex and right coronary artery. Normal left ventricular end diastolic pressure. (LVEDP). Echo in 2021 showed mild LVH. Since I last saw Mr. Isidro Mcintyre he reports he has been doing well. He denies any chest pain, pressure or tightness now or in the recent past. He denies having any increased shortness of breath or lightheaded/dizziness. He denies having any abdominal pain, bleeding problems, bowel issues, problems with his medications or any other concerns at this time. No fever or chills. No nausea or vomiting. He does report that his blood pressure is usually in the 378 systolic mmHg range. He has stopped taking his Hyzaar since his last visit due to having to low of blood pressure in the 90 systolic mmHg range. He has not been on this for about six months now. Bleeding Risks: Mr. Isidro Mcintyre denies any current or recent bleeding problems including a history of a GI bleed, ulcers, recent or upcoming surgeries, blood in his stool or black tarry stools or blood in his urine. PAST MEDICAL HISTORY:        Past Medical History:   Diagnosis Date    Arthritis     CAD (coronary artery disease)     COPD (chronic obstructive pulmonary disease) (HCC)     Disc disorder     GERD (gastroesophageal reflux disease)     Hypertension     Hypertriglyceridemia     COLEMAN (obstructive sleep apnea)     DOES NOT USE HIS CPAP MACHINE AS DIRECTED    Pancreatitis 2013    Normal Ultrasound GB 2013     CURRENT ALLERGIES: Chantix [varenicline], Lipitor [atorvastatin], and Livalo [pitavastatin] REVIEW OF SYSTEMS: 14 systems were reviewed. Pertinent positives and negatives as above, all else negative.      Past Surgical History:   Procedure Laterality Date    CARDIAC CATHETERIZATION Left 12/02/2021    DR Bey/[facility]/right radial-Moderate three vessel coronary artery disease involving a 50% mid Cx stenosis, 40% proximal LAD stenosis and a distal 60% stenosis in the right coronary artery. Normal left ventricular end diastolic pressure. Continue standard risk factor modification as clinically indicated and consider alternative etiologies of the patients symptoms. CORONARY ANGIOPLASTY WITH STENT PLACEMENT  2004    Stent x 3 RCA    CORONARY ANGIOPLASTY WITH STENT PLACEMENT  03/27/2015    CORINA RCA   stents  total    DIAGNOSTIC CARDIAC CATH LAB PROCEDURE      HERNIA REPAIR  9512    umbilical    KNEE ARTHROSCOPY      right knee for ACL repair    KNEE ARTHROSCOPY Left 08/18/2016    chondroplasty , open excision baker cyst - Dr. Dior Rubalcava Left 08/18/2016    Medial and lateral - Dr. Guevara Machado     PTCA      Social History:  Social History     Tobacco Use    Smoking status: Some Days     Packs/day: 1.00     Years: 30.00     Pack years: 30.00     Types: Cigarettes    Smokeless tobacco: Never   Vaping Use    Vaping Use: Never used   Substance Use Topics    Alcohol use: Not Currently     Alcohol/week: 3.0 standard drinks     Types: 3 Shots of liquor per week    Drug use: No        CURRENT MEDICATIONS:        Outpatient Medications Marked as Taking for the 12/7/22 encounter (Office Visit) with Kelechi Huggins MD   Medication Sig Dispense Refill    sertraline (ZOLOFT) 25 MG tablet Take 1 tablet by mouth daily 90 tablet 1    carvedilol (COREG) 25 MG tablet Take 1 tablet by mouth daily 90 tablet 3    baclofen (LIORESAL) 10 MG tablet Take 1 tablet by mouth 3 times daily as needed (muscle spasm) 30 tablet 0    rivaroxaban (XARELTO) 2.5 MG TABS tablet Take 1 tablet by mouth 2 times daily 180 tablet 3    albuterol (PROVENTIL) (2.5 MG/3ML) 0.083% nebulizer solution Take 3 mLs by nebulization every 4 hours as needed for Wheezing or Shortness of Breath (dispense nebules) 100 each 1    Multiple Vitamins-Minerals (CENTRUM ADULTS PO) Take 1 tablet by mouth daily      esomeprazole Magnesium (NEXIUM) 40 MG PACK Take 40 mg by mouth daily.          FAMILY HISTORY: family history includes Cancer in his maternal aunt; Heart Disease in his mother. Physical Examination:      BP (!) 168/87 (Site: Left Upper Arm, Position: Sitting, Cuff Size: Medium Adult)   Pulse 86   Resp 18   Ht 6' (1.829 m)   Wt 235 lb (106.6 kg)   SpO2 99%   BMI 31.87 kg/m²  Body mass index is 31.87 kg/m². Constitutional: He is oriented to person. He appears well-developed and well-nourished. In no acute distress. HEENT: Normocephalic and atraumatic. No JVD present. Carotid bruit is not present. No mass and no thyromegaly present. No lymphadenopathy present. Cardiovascular: Normal rate, regular rhythm, normal heart sounds. Exam reveals no gallop and no friction rubs. 1/6 systolic murmur, 5th intercostal space on the LEFT in the mid-clavicular line (cardiac apex). Incision site well healing from Loop recorder placement. Pulmonary/Chest: Effort normal and breath sounds normal. No respiratory distress. He has no wheezes, rhonchi or rales. Abdominal: Soft, non-tender. Bowel sounds and aorta are normal. He exhibits no organomegaly, mass or bruit. Extremities: None. No cyanosis or clubbing. 2+ radial and carotid pulses. Distal extremity pulses: 2+ bilaterally. .  Neurological: He is alert and oriented to person. No evidence of gross cranial nerve deficit. Coordination appeared normal.   Skin: Skin is warm and dry. There is no rash or diaphoresis. Psychiatric: He has a normal mood and affect. His speech is normal and behavior is normal.      MOST RECENT LABS ON RECORD:   Lab Results   Component Value Date    WBC 7.3 11/09/2021    HGB 15.0 11/09/2021    HCT 44.7 11/09/2021     11/09/2021    CHOL 164 09/22/2021    TRIG 145 09/22/2021    HDL 76 09/22/2021    LDLDIRECT  08/05/2013     Unable to report LDL Direct due to Triglycerides greater than 1200 mg/dL.     ALT 17 11/09/2021    AST 19 11/09/2021     11/09/2021    K 4.4 11/09/2021    CL 99 11/09/2021    CREATININE 0.92 11/09/2021    BUN 15 11/09/2021    CO2 24 11/09/2021    TSH 1.67 09/22/2021    PSA 0.25 09/22/2021    INR 1.1 10/23/2021    GLUF 147 (H) 09/22/2021    LABA1C 5.9 11/09/2021    LABMICR 24 (H) 05/22/2020    BNP 9 08/05/2013       ASSESSMENT:     1. Essential hypertension    2. Coronary artery disease involving native coronary artery of native heart without angina pectoris    3. S/P angioplasty with stent    4. History of syncope    5. Encounter for loop recorder check    6. Mixed hyperlipidemia    7. Mild left ventricular hypertrophy      PLAN:        Atherosclerotic Heart Disease: S/P Stenting x4 in 2004 & 2015  Antiplatelet Agent: RESTART aspirin 81 mg daily and Rivaroxiban (Xarelto) 2.5 mg twice daily. He had no issues with any bleeding he just stopped taking the ASA daily due to being on so many medications he feels. I let him know that it was tested with both ASA and Xarelto improving his risks of restenosis. Beta Blocker: STOP Carvedilol (Coreg) and START nebivolol (Bystolic) 10 mg daily. I also discussed the potential side effects of this medication including lightheadedness and dizziness and instructed them to stop the medication of this occurs and call our office if this occurs. Per patient request.   Cholesterol Reduction Therapy: Last LDL done on 9/22/2021 was 59 mg/dL at goal.   Additional counseling: I advised them to call our office or go to the emergency room if they developed worsening or persistent chest pain or increased shortness of breath as this could be life threatening. I took the liberty of ordering a CBC. I told them that they could get their lab work performed at the location of their choosing, unfortunately, if the lab work was not performed at a [facility]) facility I could not guarantee my ability to follow up with them on their results. and I ordered a complete metabolic panel (CMP).  I told them that they could get their lab work performed at the location of their choosing, unfortunately, if the lab work was not performed at a [facility]) facility I could not guarantee my ability to follow up with them on their results. I also will have him do a Lipid Panel to reassess his LDL at this time. History of Syncope/near syncope of unknown etiology: Loop recorder in place, last checked in Sept 13, 2022 and showed no events. Nonpharmacologic counseling: Because of his condition, I reminded him to try and keep himself well-hydrated and to take extra time when moving from laying to sitting, sitting to standing and standing to walking. I also explained to him to help improve his symptoms he should include 3 g sodium diet, 1 or 2 L of sports drinks daily, knee-high compressions stockings. Essential Hypertension: Borderline controlled He does report that his blood pressure is usually in the 660 systolic mmHg range. He has stopped taking his Hyzaar since his last visit due to having to low of blood pressure in the 90 systolic mmHg range. He has not been on this for about six months now. ACE Inibitor/ARB: RESTART losartan/HCTZ (Hyzaar) 50/12.5 mg every morning. I will have restart this as half of his other dose. I advised him to keep track of his blood pressure at home and call us if his blood pressures get to low or if he develops any symptoms. Beta Blocker: STOP Carvedilol (Coreg) and START nebivolol (Bystolic) 10 mg daily. Per patient choice. Because of this, I also took the liberty of ordering a repeat BMP for 7-10 days from now to assess his potassium and renal function. Hyperlipidemia: Mixed - Last LDL on 9/22/2021 was 59 mg/dL   Cholesterol Reduction Therapy: Intolerance to statins - LDL at goal as above we will get a repeat Lipid Panel to reassess his LDL at this time. In the meantime, I encouraged Mr. Trixie Cruz to continue to take his other medications. FOLLOW UP:   I told Mr. Trixie Cruz to call my office if he had any problems, but otherwise I asked him to Return in about 1 year (around 12/7/2023).  However, I would be happy to see him sooner should the need arise. Sincerely,  Yvonne Bey MD, MS, F.A.C.C. [facility] Cardiology Specialist    [address] [place] [address] [facility]St[facility], [address] [place] [address]  Phone: [phone], Fax: [phone]     I believe that the risk of significant morbidity and mortality related to the patient's current medical conditions are: Intermediate. The documentation recorded by the scribe, accurately and completely reflects the services I personally performed and the decisions made by me. Maykel Roberto MD, MS, F.A.C.C.  December 7, 2022

## 2022-12-13 PROCEDURE — 93298 REM INTERROG DEV EVAL SCRMS: CPT | Performed by: FAMILY MEDICINE

## 2022-12-20 ENCOUNTER — NURSE ONLY (OUTPATIENT)
Dept: CARDIOLOGY | Age: 52
End: 2022-12-20
Payer: COMMERCIAL

## 2022-12-20 DIAGNOSIS — Z45.09 ENCOUNTER FOR LOOP RECORDER CHECK: Primary | ICD-10-CM

## 2022-12-20 DIAGNOSIS — Z87.898 HISTORY OF SYNCOPE: ICD-10-CM

## 2023-02-28 DIAGNOSIS — J44.9 CHRONIC OBSTRUCTIVE PULMONARY DISEASE, UNSPECIFIED COPD TYPE (HCC): ICD-10-CM

## 2023-02-28 DIAGNOSIS — R06.2 WHEEZE: ICD-10-CM

## 2023-02-28 NOTE — TELEPHONE ENCOUNTER
LOV 11/08/21  RTO 3 months  LRF 11/09/21    Health Maintenance   Topic Date Due    Hepatitis C screen  Never done    Colorectal Cancer Screen  Never done    COVID-19 Vaccine (3 - Booster for Pfizer series) 12/10/2021    Shingles vaccine (1 of 2) Never done    Low dose CT lung screening  Never done    Flu vaccine (1) 08/01/2022    Depression Screen  08/26/2022    A1C test (Diabetic or Prediabetic)  11/09/2022    Lipids  09/22/2026    DTaP/Tdap/Td vaccine (2 - Td or Tdap) 10/23/2027    Pneumococcal 0-64 years Vaccine  Completed    HIV screen  Completed    Hepatitis A vaccine  Aged Out    Hib vaccine  Aged Out    Meningococcal (ACWY) vaccine  Aged Out             (applicable per patient's age: Cancer Screenings, Depression Screening, Fall Risk Screening, Immunizations)    Hemoglobin A1C (%)   Date Value   11/09/2021 5.9   09/22/2021 6.0   05/22/2020 6.1 (H)     Microalb/Crt.  Ratio (mcg/mg creat)   Date Value   05/22/2020 24 (H)     LDL Cholesterol (mg/dL)   Date Value   09/22/2021 59     AST (U/L)   Date Value   11/09/2021 19     ALT (U/L)   Date Value   11/09/2021 17     BUN (mg/dL)   Date Value   11/09/2021 15      (goal A1C is < 7)   (goal LDL is <100) need 30-50% reduction from baseline     BP Readings from Last 3 Encounters:   12/07/22 (!) 168/87   12/08/21 135/89   12/02/21 (!) 170/101    (goal /80)      All Future Testing planned in [facility]:  Lab Frequency Next Occurrence   CBC Once 12/07/2022   Lipid Panel Once 12/07/2022   Comprehensive Metabolic Panel Once 71/49/3254       Next Visit Date:  Future Appointments   Date Time Provider Amparo Rowe   3/21/2023  8:00 AM SCHEDULE, [facility]FFIN [facility] [employer] TIFF CARD [facility]   12/11/2023  9:00 AM Gean Goldberg, MD TIFF CARD [facility]            Patient Active Problem List:     Hypertriglyceridemia     Hypertension     GERD (gastroesophageal reflux disease)     CAD (coronary artery disease)     Presence of drug coated stent in right coronary artery     S/P cardiac catheterization  3/27/2015 [facility]     Lumbar disc herniation with radiculopathy     Impaired fasting blood sugar     Unstable angina (HCC)     COPD (chronic obstructive pulmonary disease) (HCC)     Precordial chest pain     Tobacco abuse     Acute bronchitis     Anxiety     Acute pancreatitis from High Fat Meal     Statin intolerance     Encounter for current long term use of antiplatelet drug     GITA (acute kidney injury) ([address]     Hypotensive episode     Alcohol abuse     Hyponatremia     Syncope and collapse     Cardiac arrest ([address]     Mild left ventricular hypertrophy

## 2023-03-01 RX ORDER — ALBUTEROL SULFATE 2.5 MG/3ML
2.5 SOLUTION RESPIRATORY (INHALATION) EVERY 4 HOURS PRN
Qty: 100 EACH | Refills: 1 | Status: SHIPPED | OUTPATIENT
Start: 2023-03-01 | End: 2024-02-29

## 2023-03-15 ENCOUNTER — NURSE ONLY (OUTPATIENT)
Dept: CARDIOLOGY | Age: 53
End: 2023-03-15

## 2023-03-15 DIAGNOSIS — Z45.09 ENCOUNTER FOR LOOP RECORDER CHECK: Primary | ICD-10-CM

## 2023-03-15 DIAGNOSIS — Z87.898 HISTORY OF SYNCOPE: ICD-10-CM

## 2023-05-07 DIAGNOSIS — F41.9 ANXIETY: ICD-10-CM

## 2023-05-09 RX ORDER — SERTRALINE HYDROCHLORIDE 25 MG/1
25 TABLET, FILM COATED ORAL DAILY
Qty: 90 TABLET | Refills: 0 | Status: SHIPPED | OUTPATIENT
Start: 2023-05-09

## 2023-06-13 PROCEDURE — 93298 REM INTERROG DEV EVAL SCRMS: CPT | Performed by: FAMILY MEDICINE

## 2023-06-19 ENCOUNTER — NURSE ONLY (OUTPATIENT)
Dept: CARDIOLOGY | Age: 53
End: 2023-06-19
Payer: COMMERCIAL

## 2023-06-19 DIAGNOSIS — R55 SYNCOPE, UNSPECIFIED SYNCOPE TYPE: ICD-10-CM

## 2023-06-19 DIAGNOSIS — Z45.09 ENCOUNTER FOR LOOP RECORDER CHECK: Primary | ICD-10-CM

## 2023-08-10 DIAGNOSIS — F41.9 ANXIETY: ICD-10-CM

## 2023-08-11 NOTE — TELEPHONE ENCOUNTER
LOV 6/28/2022     RTO N/A  LRF 5/9/2023          Controlled Substance Monitoring:    Acute and Chronic Pain Monitoring:   RX Monitoring 10/7/2016   Attestation The Prescription Monitoring Report for this patient was reviewed today. Periodic Controlled Substance Monitoring No signs of potential drug abuse or diversion identified.

## 2023-08-13 RX ORDER — SERTRALINE HYDROCHLORIDE 25 MG/1
25 TABLET, FILM COATED ORAL DAILY
Qty: 90 TABLET | Refills: 0 | Status: SHIPPED | OUTPATIENT
Start: 2023-08-13

## 2023-09-15 ENCOUNTER — NURSE ONLY (OUTPATIENT)
Dept: CARDIOLOGY | Age: 53
End: 2023-09-15

## 2023-09-15 DIAGNOSIS — R55 SYNCOPE, UNSPECIFIED SYNCOPE TYPE: ICD-10-CM

## 2023-09-15 DIAGNOSIS — Z45.09 ENCOUNTER FOR LOOP RECORDER CHECK: Primary | ICD-10-CM

## 2023-09-18 ENCOUNTER — OFFICE VISIT (OUTPATIENT)
Dept: FAMILY MEDICINE CLINIC | Age: 53
End: 2023-09-18

## 2023-09-18 ENCOUNTER — HOSPITAL ENCOUNTER (OUTPATIENT)
Age: 53
Setting detail: SPECIMEN
Discharge: HOME | End: 2023-09-18

## 2023-09-18 VITALS
WEIGHT: 232.4 LBS | HEIGHT: 72 IN | DIASTOLIC BLOOD PRESSURE: 78 MMHG | HEART RATE: 86 BPM | BODY MASS INDEX: 31.48 KG/M2 | TEMPERATURE: 97.2 F | SYSTOLIC BLOOD PRESSURE: 118 MMHG | RESPIRATION RATE: 20 BRPM

## 2023-09-18 DIAGNOSIS — Z87.898 H/O ELEVATED HOMOCYSTEINE: ICD-10-CM

## 2023-09-18 DIAGNOSIS — I10 PRIMARY HYPERTENSION: Chronic | ICD-10-CM

## 2023-09-18 DIAGNOSIS — Z00.00 ANNUAL PHYSICAL EXAM: Primary | ICD-10-CM

## 2023-09-18 DIAGNOSIS — I51.7 MILD LEFT VENTRICULAR HYPERTROPHY: ICD-10-CM

## 2023-09-18 DIAGNOSIS — Z12.5 SCREENING FOR PROSTATE CANCER: ICD-10-CM

## 2023-09-18 DIAGNOSIS — R73.01 IMPAIRED FASTING BLOOD SUGAR: ICD-10-CM

## 2023-09-18 DIAGNOSIS — R35.1 NOCTURIA: ICD-10-CM

## 2023-09-18 DIAGNOSIS — E78.1 HYPERTRIGLYCERIDEMIA: Chronic | ICD-10-CM

## 2023-09-18 DIAGNOSIS — Z72.0 TOBACCO ABUSE: ICD-10-CM

## 2023-09-18 DIAGNOSIS — F41.9 ANXIETY: ICD-10-CM

## 2023-09-18 DIAGNOSIS — J44.9 CHRONIC OBSTRUCTIVE PULMONARY DISEASE, UNSPECIFIED COPD TYPE (HCC): ICD-10-CM

## 2023-09-18 DIAGNOSIS — G47.33 OBSTRUCTIVE SLEEP APNEA SYNDROME: ICD-10-CM

## 2023-09-18 DIAGNOSIS — F10.10 ALCOHOL ABUSE: ICD-10-CM

## 2023-09-18 DIAGNOSIS — I25.10 CORONARY ARTERY DISEASE INVOLVING NATIVE CORONARY ARTERY OF NATIVE HEART WITHOUT ANGINA PECTORIS: ICD-10-CM

## 2023-09-18 DIAGNOSIS — K21.9 GASTROESOPHAGEAL REFLUX DISEASE, UNSPECIFIED WHETHER ESOPHAGITIS PRESENT: Chronic | ICD-10-CM

## 2023-09-18 DIAGNOSIS — Z12.11 SCREENING FOR COLORECTAL CANCER: ICD-10-CM

## 2023-09-18 DIAGNOSIS — Z12.12 SCREENING FOR COLORECTAL CANCER: ICD-10-CM

## 2023-09-18 DIAGNOSIS — Z87.891 PERSONAL HISTORY OF TOBACCO USE: ICD-10-CM

## 2023-09-18 DIAGNOSIS — E87.1 HYPONATREMIA: ICD-10-CM

## 2023-09-18 DIAGNOSIS — F33.1 MODERATE EPISODE OF RECURRENT MAJOR DEPRESSIVE DISORDER (HCC): ICD-10-CM

## 2023-09-18 LAB
25(OH)D3 SERPL-MCNC: 42 NG/ML
ALBUMIN SERPL-MCNC: 4.7 G/DL (ref 3.5–5.2)
ALBUMIN/GLOB SERPL: 1.5 {RATIO} (ref 1–2.5)
ALP SERPL-CCNC: 53 U/L (ref 40–129)
ALT SERPL-CCNC: 22 U/L (ref 5–41)
ANION GAP SERPL CALCULATED.3IONS-SCNC: 17 MMOL/L (ref 9–17)
AST SERPL-CCNC: 22 U/L
BASOPHILS # BLD: 0.05 K/UL (ref 0–0.2)
BASOPHILS NFR BLD: 1 % (ref 0–2)
BILIRUB SERPL-MCNC: 0.4 MG/DL (ref 0.3–1.2)
BILIRUB UR QL STRIP: NEGATIVE
BUN SERPL-MCNC: 12 MG/DL (ref 6–20)
CALCIUM SERPL-MCNC: 10.3 MG/DL (ref 8.6–10.4)
CHLORIDE SERPL-SCNC: 100 MMOL/L (ref 98–107)
CHOLEST SERPL-MCNC: 148 MG/DL
CHOLESTEROL/HDL RATIO: 2.2
CLARITY UR: CLEAR
CO2 SERPL-SCNC: 23 MMOL/L (ref 20–31)
COLOR UR: YELLOW
COMMENT: NORMAL
CREAT SERPL-MCNC: 0.9 MG/DL (ref 0.7–1.2)
CREAT UR-MCNC: 173.1 MG/DL (ref 39–259)
EOSINOPHIL # BLD: 0.07 K/UL (ref 0–0.44)
EOSINOPHILS RELATIVE PERCENT: 1 % (ref 1–4)
ERYTHROCYTE [DISTWIDTH] IN BLOOD BY AUTOMATED COUNT: 12.7 % (ref 11.8–14.4)
EST. AVERAGE GLUCOSE BLD GHB EST-MCNC: 151 MG/DL
FERRITIN SERPL-MCNC: 1997 NG/ML (ref 30–400)
FOLATE SERPL-MCNC: <2 NG/ML
GFR SERPL CREATININE-BSD FRML MDRD: >60 ML/MIN/1.73M2
GLUCOSE P FAST SERPL-MCNC: 118 MG/DL (ref 70–99)
GLUCOSE UR STRIP-MCNC: NEGATIVE MG/DL
HBA1C MFR BLD: 6.9 % (ref 4–6)
HCT VFR BLD AUTO: 50.5 % (ref 40.7–50.3)
HDLC SERPL-MCNC: 66 MG/DL
HGB BLD-MCNC: 16.7 G/DL (ref 13–17)
HGB UR QL STRIP.AUTO: NEGATIVE
IMM GRANULOCYTES # BLD AUTO: <0.03 K/UL (ref 0–0.3)
IMM GRANULOCYTES NFR BLD: 0 %
INSULIN COMMENT: NORMAL
INSULIN REFERENCE RANGE:: NORMAL
INSULIN: 7.1 MU/L
IRON SATN MFR SERPL: 43 % (ref 20–55)
IRON SERPL-MCNC: 161 UG/DL (ref 59–158)
KETONES UR STRIP-MCNC: NEGATIVE MG/DL
LDLC SERPL CALC-MCNC: 31 MG/DL (ref 0–130)
LEUKOCYTE ESTERASE UR QL STRIP: NEGATIVE
LYMPHOCYTES NFR BLD: 2.24 K/UL (ref 1.1–3.7)
LYMPHOCYTES RELATIVE PERCENT: 46 % (ref 24–43)
MAGNESIUM SERPL-MCNC: 2.2 MG/DL (ref 1.6–2.6)
MCH RBC QN AUTO: 32.4 PG (ref 25.2–33.5)
MCHC RBC AUTO-ENTMCNC: 33.1 G/DL (ref 28.4–34.8)
MCV RBC AUTO: 98.1 FL (ref 82.6–102.9)
MICROALBUMIN UR-MCNC: 48 MG/L
MICROALBUMIN/CREAT UR-RTO: 28 MCG/MG CREAT
MONOCYTES NFR BLD: 0.33 K/UL (ref 0.1–1.2)
MONOCYTES NFR BLD: 7 % (ref 3–12)
NEUTROPHILS NFR BLD: 45 % (ref 36–65)
NEUTS SEG NFR BLD: 2.17 K/UL (ref 1.5–8.1)
NITRITE UR QL STRIP: NEGATIVE
NRBC BLD-RTO: 0 PER 100 WBC
PH UR STRIP: 5.5 [PH] (ref 5–8)
PLATELET # BLD AUTO: 242 K/UL (ref 138–453)
PMV BLD AUTO: 10.6 FL (ref 8.1–13.5)
POTASSIUM SERPL-SCNC: 4.6 MMOL/L (ref 3.7–5.3)
PROT SERPL-MCNC: 7.8 G/DL (ref 6.4–8.3)
PROT UR STRIP-MCNC: NEGATIVE MG/DL
PSA SERPL-MCNC: 0.23 NG/ML
PTH-INTACT SERPL-MCNC: 24.2 PG/ML (ref 14–72)
RBC # BLD AUTO: 5.15 M/UL (ref 4.21–5.77)
SHBG SERPL-SCNC: 27 NMOL/L (ref 11–80)
SODIUM SERPL-SCNC: 140 MMOL/L (ref 135–144)
SP GR UR STRIP: 1.02 (ref 1–1.03)
TESTOST FREE MFR SERPL: 71.2 PG/ML (ref 47–244)
TESTOST SERPL-MCNC: 321 NG/DL (ref 220–1000)
TIBC SERPL-MCNC: 374 UG/DL (ref 250–450)
TRIGL SERPL-MCNC: 254 MG/DL
TSH SERPL DL<=0.05 MIU/L-ACNC: 1.07 UIU/ML (ref 0.3–5)
UNSATURATED IRON BINDING CAPACITY: 213 UG/DL (ref 112–347)
UROBILINOGEN UR STRIP-ACNC: NORMAL EU/DL (ref 0–1)
VIT B12 SERPL-MCNC: 292 PG/ML (ref 232–1245)
WBC OTHER # BLD: 4.9 K/UL (ref 3.5–11.3)

## 2023-09-18 ASSESSMENT — SLEEP AND FATIGUE QUESTIONNAIRES
HOW LIKELY ARE YOU TO NOD OFF OR FALL ASLEEP WHEN YOU ARE A PASSENGER IN A CAR FOR AN HOUR WITHOUT A BREAK: 2
HOW LIKELY ARE YOU TO NOD OFF OR FALL ASLEEP WHILE SITTING AND READING: 1
HOW LIKELY ARE YOU TO NOD OFF OR FALL ASLEEP WHILE SITTING QUIETLY AFTER LUNCH WITHOUT ALCOHOL: 0
ESS TOTAL SCORE: 9
HOW LIKELY ARE YOU TO NOD OFF OR FALL ASLEEP WHILE SITTING INACTIVE IN A PUBLIC PLACE: 2
HOW LIKELY ARE YOU TO NOD OFF OR FALL ASLEEP WHILE SITTING AND TALKING TO SOMEONE: 0
HOW LIKELY ARE YOU TO NOD OFF OR FALL ASLEEP WHILE WATCHING TV: 2
NECK CIRCUMFERENCE (INCHES): 18.5
HOW LIKELY ARE YOU TO NOD OFF OR FALL ASLEEP IN A CAR, WHILE STOPPED FOR A FEW MINUTES IN TRAFFIC: 0
HOW LIKELY ARE YOU TO NOD OFF OR FALL ASLEEP WHILE LYING DOWN TO REST IN THE AFTERNOON WHEN CIRCUMSTANCES PERMIT: 2

## 2023-09-18 ASSESSMENT — VISUAL ACUITY: OU: 1

## 2023-09-18 ASSESSMENT — ENCOUNTER SYMPTOMS
CHEST TIGHTNESS: 0
NAUSEA: 0
BACK PAIN: 0
TROUBLE SWALLOWING: 0
ABDOMINAL DISTENTION: 0
WHEEZING: 0
SINUS PRESSURE: 0
BLOOD IN STOOL: 0
SHORTNESS OF BREATH: 0
DIARRHEA: 0
ABDOMINAL PAIN: 0
CONSTIPATION: 0
SORE THROAT: 0
COUGH: 0
RHINORRHEA: 0

## 2023-09-18 ASSESSMENT — PATIENT HEALTH QUESTIONNAIRE - PHQ9
SUM OF ALL RESPONSES TO PHQ QUESTIONS 1-9: 2
2. FEELING DOWN, DEPRESSED OR HOPELESS: 1
SUM OF ALL RESPONSES TO PHQ QUESTIONS 1-9: 2
1. LITTLE INTEREST OR PLEASURE IN DOING THINGS: 1
SUM OF ALL RESPONSES TO PHQ9 QUESTIONS 1 & 2: 2

## 2023-09-18 NOTE — PROGRESS NOTES
History and Physical    [facility]   [address] [facility] [address]  [address]  YOB: 1970    Date of Service:  9/18/2023    Chief Complaint:   Helena Cowart is a 48 y.o. male who presents for complete physical examination. Visit Summary:  Patient presents today for follow up / physical   Reviewed health maintenance and any recent labs/imaging      He has not been seen in 2 years    Since last OV he has seen and followed with cardiology   He also has a loop recorder which is scanned every 3 months? Patient states it has been unplugged in the home for many months and is still having it scan? ??     12/7/2022 cardiology -   I had the pleasure of seeing Helena Cowart in my office today. Mr. Henrry Silvestre is a 50 y.o. male with a history of hypertension. He does have stenting in his RCA, 2 or 3 in 2004 and another one in 2015 with Dr. Jaida Cho. At that time he says his symptoms were feeling run down without any chest pain or pressure but he did say he had some mild discomfort in his chest. He also reports a longstanding history of hypertension which has been on and off controlled. He recently was in the hospital due to having a syncopal episode. He was driving and started having bright white in his vision. He pulled over and his wife started driving. Once he got into the passenger seat and she started driving and it happened again. That was the first time it had ever happened and it has not happened since. He was told it could have happened due to dehydration. He underwent extensive testing at that time including a stress test, ECG and echocardiogram and all were largely unremarkable other than mild left ventricular hypertrophy. He had a second episode of passing out in 12/2/2021 at a barbershop. He did not have any chest pain, pressure, palpitations, or shortness of breath. He did not have any lightheadedness or dizziness associated with it.   He states he was

## 2023-09-19 ENCOUNTER — TELEPHONE (OUTPATIENT)
Dept: CARDIOLOGY | Age: 53
End: 2023-09-19

## 2023-09-19 NOTE — TELEPHONE ENCOUNTER
----- Message from Kenneth Hays MD sent at 9/18/2023  9:57 PM EDT -----  Let Mr. Jolee Severance know their test result was ok. Will discuss at next visit. Thanks.

## 2023-09-20 RX ORDER — RIVAROXABAN 2.5 MG/1
2.5 TABLET, FILM COATED ORAL 2 TIMES DAILY
Qty: 180 TABLET | Refills: 3 | Status: SHIPPED | OUTPATIENT
Start: 2023-09-20

## 2023-10-02 ENCOUNTER — HOSPITAL ENCOUNTER (OUTPATIENT)
Dept: CT IMAGING | Age: 53
Discharge: HOME | End: 2023-10-04
Payer: COMMERCIAL

## 2023-10-02 DIAGNOSIS — Z72.0 TOBACCO ABUSE: ICD-10-CM

## 2023-10-02 PROCEDURE — 71271 CT THORAX LUNG CANCER SCR C-: CPT

## 2023-10-03 ENCOUNTER — TELEPHONE (OUTPATIENT)
Dept: SURGERY | Age: 53
End: 2023-10-03

## 2023-10-03 DIAGNOSIS — E11.9 NEW ONSET TYPE 2 DIABETES MELLITUS (HCC): Primary | ICD-10-CM

## 2023-10-03 NOTE — TELEPHONE ENCOUNTER
[facility]  Screening colonoscopy questionnaire  Ababilio Delacruz    Pt Name: Helena Cowart  MRN: [id number]  [address] [facility] 1970  Primary Care Physician: GILMA Raymond CNP      Have you ever had a colonoscopy? No  When was your last colonoscopy? N/A  Were any polyps removed or any other significant findings? N/A    Have you recently had a stool test to check for colon cancer? No  Was it positive? No    Do you have any family history of colon cancer? No  If yes, which family member had colon cancer? N/A  Were they diagnosed younger or older than the age of 61? N/A    Do you have a history of Crohn's disease or Ulcerative Colitis? No    Do you have a history of constipation? No    Do you have a history of bloody or black stools? No    Have you ever had surgery done inside your abdomen? Yes  What surgery? UMBILICAL HERNIA 20 YEARS AGO    8. Are you taking any blood thinners? Yes   If yes, what is the name of the blood thinner? Mamie Solis    Scheduling:    10/3/23 - Spoke to patient, colonoscopy scheduled at [facility], patient confirmed and information mailed to patient. Surgery date/time: 10/16/23 at 9:45AM  Arrival time: 8:15AM  Prep appt: PHONE CALL at [address] [place] [address]. GOLYTELY INSTRUCTIONS SENT TO PATIENT.

## 2023-10-09 ENCOUNTER — OFFICE VISIT (OUTPATIENT)
Dept: CARDIOLOGY | Age: 53
End: 2023-10-09
Payer: COMMERCIAL

## 2023-10-09 ENCOUNTER — TELEPHONE (OUTPATIENT)
Dept: SURGERY | Age: 53
End: 2023-10-09

## 2023-10-09 VITALS
HEART RATE: 78 BPM | HEIGHT: 72 IN | BODY MASS INDEX: 31.42 KG/M2 | RESPIRATION RATE: 18 BRPM | SYSTOLIC BLOOD PRESSURE: 108 MMHG | DIASTOLIC BLOOD PRESSURE: 73 MMHG | WEIGHT: 232 LBS | OXYGEN SATURATION: 98 %

## 2023-10-09 DIAGNOSIS — Z01.818 PRE-OPERATIVE CLEARANCE: ICD-10-CM

## 2023-10-09 DIAGNOSIS — I25.10 CORONARY ARTERY DISEASE INVOLVING NATIVE CORONARY ARTERY OF NATIVE HEART WITHOUT ANGINA PECTORIS: Primary | ICD-10-CM

## 2023-10-09 DIAGNOSIS — Z87.898 HISTORY OF SYNCOPE: ICD-10-CM

## 2023-10-09 DIAGNOSIS — Z78.9 STATIN INTOLERANCE: ICD-10-CM

## 2023-10-09 DIAGNOSIS — I10 ESSENTIAL HYPERTENSION: ICD-10-CM

## 2023-10-09 DIAGNOSIS — Z95.820 S/P ANGIOPLASTY WITH STENT: ICD-10-CM

## 2023-10-09 DIAGNOSIS — E78.2 MIXED HYPERLIPIDEMIA: ICD-10-CM

## 2023-10-09 PROCEDURE — 3074F SYST BP LT 130 MM HG: CPT | Performed by: FAMILY MEDICINE

## 2023-10-09 PROCEDURE — 99214 OFFICE O/P EST MOD 30 MIN: CPT | Performed by: FAMILY MEDICINE

## 2023-10-09 PROCEDURE — 3078F DIAST BP <80 MM HG: CPT | Performed by: FAMILY MEDICINE

## 2023-10-09 PROCEDURE — 93000 ELECTROCARDIOGRAM COMPLETE: CPT | Performed by: FAMILY MEDICINE

## 2023-10-09 RX ORDER — POLYETHYLENE GLYCOL 3350, SODIUM SULFATE ANHYDROUS, SODIUM BICARBONATE, SODIUM CHLORIDE, POTASSIUM CHLORIDE 236; 22.74; 6.74; 5.86; 2.97 G/4L; G/4L; G/4L; G/4L; G/4L
4 POWDER, FOR SOLUTION ORAL ONCE
Qty: 4000 ML | Refills: 0 | Status: SHIPPED | OUTPATIENT
Start: 2023-10-09 | End: 2023-10-09

## 2023-10-09 NOTE — PROGRESS NOTES
Cecil Carpenter am scribing for and in the presence of Bhavesh Rodriguez. Sotero MCCANN, MS, F.A.C.C..    Patient: Steve Tijerina  : 1970  Date of Visit: 2023    REASON FOR VISIT / CONSULTATION: Coronary Artery Disease (HX: CAD S/p Stent, Syncope, HTN. Routine colonoscopy, date is next Monday. No complaints. )      History of Present Illness:        Dear GILMA Bertrand - CNP,    I had the pleasure of seeing Steve Tijerina in my office today. Mr. Kena Serna is a 48 y.o. male with a history of hypertension. He does have stenting in his RCA, 2 or 3 in  and another one in  with Dr. Kinjal Lopez. At that time he says his symptoms were feeling run down without any chest pain or pressure but he did say he had some mild discomfort in his chest. He also reports a longstanding history of hypertension which has been on and off controlled. He recently was in the hospital due to having a syncopal episode. He was driving and started having bright white in his vision. He pulled over and his wife started driving. Once he got into the passenger seat and she started driving and it happened again. That was the first time it had ever happened and it has not happened since. He was told it could have happened due to dehydration. He underwent extensive testing at that time including a stress test, ECG and echocardiogram and all were largely unremarkable other than mild left ventricular hypertrophy. He had a second episode of passing out in 2021 at a barbershop. He did not have any chest pain, pressure, palpitations, or shortness of breath. He did not have any lightheadedness or dizziness associated with it. He states he was getting his haircut and when she turned him around in the chair to tell him that he was done he was slumped over in the chair and not responsive. He did receive a minute and a half of CPR. Following this he underwent a LINQ Loop placed on 2021.  Cardiac catheterization done 2021: moderate

## 2023-10-09 NOTE — TELEPHONE ENCOUNTER
10/9/23- LVM (1st attempt) with Dr. Nette Mancia office regarding update on cardiology clearance faxed on 10/3/23

## 2023-10-09 NOTE — PATIENT INSTRUCTIONS
SURVEY:    You may be receiving a survey from [employer] regarding your visit today. Please complete the survey to enable us to provide the highest quality of care to you and your family. If you cannot score us a very good on any question, please call the office to discuss how we could have made your experience a very good one. Thank you.

## 2023-10-10 NOTE — DISCHARGE INSTRUCTIONS

## 2023-10-10 NOTE — TELEPHONE ENCOUNTER
10/10/23- Called patient to review instructions to stop Xarelto 5-7 days prior to colonoscopy. VM full and cannot LVM.

## 2023-10-12 NOTE — PROGRESS NOTES
PAT phone call for colonoscopy  completed with pt. Date/time/location (entrance C) of surgery/procedure verified with pt. NPO after MN status verified with pt. Need for   verified with pt. Verified need to complete bowel prep/instructions per Dr. Ceci Fagan pt to take a shower the AM of surgery/procedure and to avoid lotions, creams, jewelry. Encouraged pt to avoid artificial nails and/or nailpolish. Instructed pt to take BP meds with a small sip of water prior to procedure/surgery.

## 2023-10-12 NOTE — TELEPHONE ENCOUNTER
10/12/23 Called patient (2nd attempt) VM is full. Writer sent a patient message through [facility] requesting patient call the office.

## 2023-10-13 ENCOUNTER — ANESTHESIA EVENT (OUTPATIENT)
Dept: OPERATING ROOM | Age: 53
End: 2023-10-13
Payer: COMMERCIAL

## 2023-10-13 NOTE — TELEPHONE ENCOUNTER
10/13/23- Spoke to patient. He stopped his Xarelto on Monday per his cardiologist. Bowel prep instructions reviewed and all questions answered. Patient aware of time change.

## 2023-10-16 ENCOUNTER — HOSPITAL ENCOUNTER (OUTPATIENT)
Age: 53
Setting detail: OUTPATIENT SURGERY
Discharge: HOME | End: 2023-10-16
Attending: SURGERY | Admitting: SURGERY
Payer: COMMERCIAL

## 2023-10-16 ENCOUNTER — ANESTHESIA (OUTPATIENT)
Dept: OPERATING ROOM | Age: 53
End: 2023-10-16
Payer: COMMERCIAL

## 2023-10-16 VITALS
DIASTOLIC BLOOD PRESSURE: 94 MMHG | BODY MASS INDEX: 30.91 KG/M2 | SYSTOLIC BLOOD PRESSURE: 151 MMHG | TEMPERATURE: 97.5 F | RESPIRATION RATE: 11 BRPM | HEIGHT: 72 IN | OXYGEN SATURATION: 99 % | WEIGHT: 228.25 LBS | HEART RATE: 67 BPM

## 2023-10-16 DIAGNOSIS — Z12.11 SCREENING FOR COLON CANCER: ICD-10-CM

## 2023-10-16 PROCEDURE — 6360000002 HC RX W HCPCS: Performed by: NURSE ANESTHETIST, CERTIFIED REGISTERED

## 2023-10-16 PROCEDURE — 7100000010 HC PHASE II RECOVERY - FIRST 15 MIN: Performed by: SURGERY

## 2023-10-16 PROCEDURE — 45385 COLONOSCOPY W/LESION REMOVAL: CPT | Performed by: SURGERY

## 2023-10-16 PROCEDURE — 2709999900 HC NON-CHARGEABLE SUPPLY: Performed by: SURGERY

## 2023-10-16 PROCEDURE — 2500000003 HC RX 250 WO HCPCS: Performed by: NURSE ANESTHETIST, CERTIFIED REGISTERED

## 2023-10-16 PROCEDURE — 2580000003 HC RX 258: Performed by: NURSE ANESTHETIST, CERTIFIED REGISTERED

## 2023-10-16 PROCEDURE — 3700000001 HC ADD 15 MINUTES (ANESTHESIA): Performed by: SURGERY

## 2023-10-16 PROCEDURE — 3700000000 HC ANESTHESIA ATTENDED CARE: Performed by: SURGERY

## 2023-10-16 PROCEDURE — 3609010400 HC COLONOSCOPY POLYPECTOMY HOT BIOPSY: Performed by: SURGERY

## 2023-10-16 PROCEDURE — 45380 COLONOSCOPY AND BIOPSY: CPT | Performed by: SURGERY

## 2023-10-16 PROCEDURE — 88305 TISSUE EXAM BY PATHOLOGIST: CPT

## 2023-10-16 PROCEDURE — 7100000011 HC PHASE II RECOVERY - ADDTL 15 MIN: Performed by: SURGERY

## 2023-10-16 RX ORDER — MIDAZOLAM HYDROCHLORIDE 1 MG/ML
INJECTION INTRAMUSCULAR; INTRAVENOUS PRN
Status: DISCONTINUED | OUTPATIENT
Start: 2023-10-16 | End: 2023-10-16 | Stop reason: SDUPTHER

## 2023-10-16 RX ORDER — LIDOCAINE HYDROCHLORIDE 10 MG/ML
1 INJECTION, SOLUTION EPIDURAL; INFILTRATION; INTRACAUDAL; PERINEURAL
Status: DISCONTINUED | OUTPATIENT
Start: 2023-10-16 | End: 2023-10-16 | Stop reason: HOSPADM

## 2023-10-16 RX ORDER — SODIUM CHLORIDE 0.9 % (FLUSH) 0.9 %
5-40 SYRINGE (ML) INJECTION PRN
Status: DISCONTINUED | OUTPATIENT
Start: 2023-10-16 | End: 2023-10-16 | Stop reason: HOSPADM

## 2023-10-16 RX ORDER — LABETALOL HYDROCHLORIDE 5 MG/ML
10 INJECTION, SOLUTION INTRAVENOUS
Status: DISCONTINUED | OUTPATIENT
Start: 2023-10-16 | End: 2023-10-16 | Stop reason: HOSPADM

## 2023-10-16 RX ORDER — HYDRALAZINE HYDROCHLORIDE 20 MG/ML
10 INJECTION INTRAMUSCULAR; INTRAVENOUS
Status: DISCONTINUED | OUTPATIENT
Start: 2023-10-16 | End: 2023-10-16 | Stop reason: HOSPADM

## 2023-10-16 RX ORDER — LIDOCAINE HYDROCHLORIDE 10 MG/ML
INJECTION, SOLUTION INFILTRATION; PERINEURAL PRN
Status: DISCONTINUED | OUTPATIENT
Start: 2023-10-16 | End: 2023-10-16 | Stop reason: SDUPTHER

## 2023-10-16 RX ORDER — SODIUM CHLORIDE 0.9 % (FLUSH) 0.9 %
5-40 SYRINGE (ML) INJECTION EVERY 12 HOURS SCHEDULED
Status: DISCONTINUED | OUTPATIENT
Start: 2023-10-16 | End: 2023-10-16 | Stop reason: HOSPADM

## 2023-10-16 RX ORDER — DEXMEDETOMIDINE HYDROCHLORIDE 100 UG/ML
INJECTION, SOLUTION INTRAVENOUS PRN
Status: DISCONTINUED | OUTPATIENT
Start: 2023-10-16 | End: 2023-10-16 | Stop reason: SDUPTHER

## 2023-10-16 RX ORDER — GLYCOPYRROLATE 1 MG/5 ML
SYRINGE (ML) INTRAVENOUS PRN
Status: DISCONTINUED | OUTPATIENT
Start: 2023-10-16 | End: 2023-10-16 | Stop reason: SDUPTHER

## 2023-10-16 RX ORDER — PROCHLORPERAZINE EDISYLATE 5 MG/ML
5 INJECTION INTRAMUSCULAR; INTRAVENOUS
Status: DISCONTINUED | OUTPATIENT
Start: 2023-10-16 | End: 2023-10-16 | Stop reason: HOSPADM

## 2023-10-16 RX ORDER — PROPOFOL 10 MG/ML
INJECTION, EMULSION INTRAVENOUS PRN
Status: DISCONTINUED | OUTPATIENT
Start: 2023-10-16 | End: 2023-10-16 | Stop reason: SDUPTHER

## 2023-10-16 RX ORDER — SODIUM CHLORIDE, SODIUM LACTATE, POTASSIUM CHLORIDE, CALCIUM CHLORIDE 600; 310; 30; 20 MG/100ML; MG/100ML; MG/100ML; MG/100ML
INJECTION, SOLUTION INTRAVENOUS CONTINUOUS
Status: DISCONTINUED | OUTPATIENT
Start: 2023-10-16 | End: 2023-10-16 | Stop reason: HOSPADM

## 2023-10-16 RX ORDER — SODIUM CHLORIDE 9 MG/ML
INJECTION, SOLUTION INTRAVENOUS PRN
Status: DISCONTINUED | OUTPATIENT
Start: 2023-10-16 | End: 2023-10-16 | Stop reason: HOSPADM

## 2023-10-16 RX ORDER — SODIUM CHLORIDE, SODIUM LACTATE, POTASSIUM CHLORIDE, CALCIUM CHLORIDE 600; 310; 30; 20 MG/100ML; MG/100ML; MG/100ML; MG/100ML
INJECTION, SOLUTION INTRAVENOUS CONTINUOUS PRN
Status: DISCONTINUED | OUTPATIENT
Start: 2023-10-16 | End: 2023-10-16 | Stop reason: SDUPTHER

## 2023-10-16 RX ADMIN — MIDAZOLAM 2 MG: 1 INJECTION INTRAMUSCULAR; INTRAVENOUS at 08:59

## 2023-10-16 RX ADMIN — PROPOFOL 50 MG: 10 INJECTION, EMULSION INTRAVENOUS at 09:20

## 2023-10-16 RX ADMIN — PROPOFOL 50 MG: 10 INJECTION, EMULSION INTRAVENOUS at 09:05

## 2023-10-16 RX ADMIN — PROPOFOL 130 MG: 10 INJECTION, EMULSION INTRAVENOUS at 09:04

## 2023-10-16 RX ADMIN — PROPOFOL 50 MG: 10 INJECTION, EMULSION INTRAVENOUS at 09:14

## 2023-10-16 RX ADMIN — Medication 0.2 MG: at 08:59

## 2023-10-16 RX ADMIN — PROPOFOL 50 MG: 10 INJECTION, EMULSION INTRAVENOUS at 09:23

## 2023-10-16 RX ADMIN — PROPOFOL 50 MG: 10 INJECTION, EMULSION INTRAVENOUS at 09:17

## 2023-10-16 RX ADMIN — LIDOCAINE HYDROCHLORIDE 60 MG: 10 INJECTION, SOLUTION INFILTRATION; PERINEURAL at 09:01

## 2023-10-16 RX ADMIN — PROPOFOL 50 MG: 10 INJECTION, EMULSION INTRAVENOUS at 09:06

## 2023-10-16 RX ADMIN — DEXMEDETOMIDINE HCL 4 MCG: 100 INJECTION INTRAVENOUS at 09:02

## 2023-10-16 RX ADMIN — PROPOFOL 50 MG: 10 INJECTION, EMULSION INTRAVENOUS at 09:11

## 2023-10-16 RX ADMIN — SODIUM CHLORIDE, POTASSIUM CHLORIDE, SODIUM LACTATE AND CALCIUM CHLORIDE: 600; 310; 30; 20 INJECTION, SOLUTION INTRAVENOUS at 08:59

## 2023-10-16 RX ADMIN — DEXMEDETOMIDINE HCL 4 MCG: 100 INJECTION INTRAVENOUS at 09:14

## 2023-10-16 RX ADMIN — PROPOFOL 50 MG: 10 INJECTION, EMULSION INTRAVENOUS at 09:08

## 2023-10-16 ASSESSMENT — LIFESTYLE VARIABLES: SMOKING_STATUS: 1

## 2023-10-16 ASSESSMENT — PAIN - FUNCTIONAL ASSESSMENT: PAIN_FUNCTIONAL_ASSESSMENT: 0-10

## 2023-10-16 NOTE — H&P
[address] [facility]      Patient's Name/ Date of Birth/ Gender: Bárbara Young / 1970 (68 y.o.) / male     Attending physician: Suzette Reinoso DO    CC: colorectal cancer screening    History of present Illness: Bárbara Young is a 48 y.o. male, presents today for colonoscopy for:    Active Hospital Problems    Diagnosis Date Noted    Colon cancer screening [Z12.11] 10/16/2023         Colonoscopy history: No prior cscope. Past Medical History:  has a past medical history of Arthritis, CAD (coronary artery disease), COPD (chronic obstructive pulmonary disease) ([address] [facility]), Disc disorder, GERD (gastroesophageal reflux disease), Hypertension, Hypertriglyceridemia, COLEMAN (obstructive sleep apnea), and Pancreatitis. Past Surgical History:   Past Surgical History:   Procedure Laterality Date    CARDIAC CATHETERIZATION Left 12/02/2021    [facility]-Moderate three vessel coronary artery disease involving a 50% mid Cx stenosis, 40% proximal LAD stenosis and a distal 60% stenosis in the right coronary artery. Normal left ventricular end diastolic pressure. Continue standard risk factor modification as clinically indicated and consider alternative etiologies of the patients symptoms. CORONARY ANGIOPLASTY WITH STENT PLACEMENT  2004    Stent x 3 RCA    CORONARY ANGIOPLASTY WITH STENT PLACEMENT  03/27/2015    CORINA RCA   stents  total    DIAGNOSTIC CARDIAC CATH LAB PROCEDURE      HERNIA REPAIR  7166    umbilical    KNEE ARTHROSCOPY      right knee for ACL repair    KNEE ARTHROSCOPY Left 08/18/2016    chondroplasty , open excision baker cyst - Dr. Darryle Fennel Left 08/18/2016    Medial and lateral - Dr. Tori Stewart History:  reports that he has been smoking cigarettes. He has a 30.00 pack-year smoking history. He has never used smokeless tobacco. He reports current alcohol use of about 3.0 standard drinks of alcohol per week.

## 2023-10-16 NOTE — ANESTHESIA PRE PROCEDURE
Department of Anesthesiology  Preprocedure Note       Name:  Jerry Mendez   Age:  48 y.o.  :  1970                                          MRN:  [id number]         Date:  10/16/2023      Surgeon: Peter Hoff):  Tiffany Cabrera DO    Procedure: Procedure(s):  COLORECTAL CANCER SCREENING, NOT HIGH RISK    Medications prior to admission:   Prior to Admission medications    Medication Sig Start Date End Date Taking?  Authorizing Provider   metFORMIN (GLUCOPHAGE) 500 MG tablet Take 2 tablets by mouth 2 times daily (with meals) 10/3/23 10/2/24  GILMA Kline CNP   XARELTO 2.5 MG TABS tablet TAKE 1 TABLET BY MOUTH 2 TIMES A DAY 23   Otilia Olivares PA-C   sertraline (ZOLOFT) 50 MG tablet Take 1 tablet by mouth daily 23   GILMA Kline CNP   albuterol (PROVENTIL) (2.5 MG/3ML) 0.083% nebulizer solution Take 3 mLs by nebulization every 4 hours as needed for Wheezing or Shortness of Breath (dispense nebules)  Patient not taking: Reported on 10/16/2023 3/1/23 2/29/24  GILMA Kline CNP   losartan-hydroCHLOROthiazide [facility]) 50-12.5 MG per tablet Take 1 tablet by mouth daily 22   Marixa Hull MD   nebivolol (BYSTOLIC) 10 MG tablet Take 1 tablet by mouth daily 22   Marixa Hull MD   aspirin EC 81 MG EC tablet Take 1 tablet by mouth daily 22   Marixa Hull MD   baclofen (LIORESAL) 10 MG tablet Take 1 tablet by mouth 3 times daily as needed (muscle spasm) 22   GILMA Kline CNP   Respiratory Therapy Supplies (NEBULIZER COMPRESSOR) KIT 1 kit by Does not apply route once for 1 dose 18  GILMA Akbar CNP   Multiple Vitamins-Minerals (CENTRUM ADULTS PO) Take 1 tablet by mouth daily    Faiza Contreras MD   esomeprazole Magnesium (NEXIUM) 40 MG PACK Take 1 packet by mouth daily    ProviderFaiza MD       Current medications:    Current Facility-Administered Medications   Medication Dose Route Frequency Provider Last Rate

## 2023-10-16 NOTE — ANESTHESIA POSTPROCEDURE EVALUATION
Department of Anesthesiology  Postprocedure Note    Patient: Melinda Davis  MRN: [id number]  YOB: 1970  Date of evaluation: 10/16/2023      Procedure Summary     Date: 10/16/23 Room / Location: [address] / [facility]) [facility]    Anesthesia Start: 1393 Anesthesia Stop: 5324    Procedure: Colonoscopy with Sigmoid Colon Polypectomy and Hot Snare Sigmoid Colon Polypectomy Diagnosis:       Screening for colon cancer      (Screening for colon cancer [Z12.11])    Surgeons: Rosaura Chaudhari DO Responsible Provider: Kaykay Mcdonough MD    Anesthesia Type: MAC ASA Status: 3          Anesthesia Type: No value filed.     Jony Phase I: Jony Score: 10    Jony Phase II: Jony Score: 10      Anesthesia Post Evaluation    Patient location during evaluation: PACU  Patient participation: complete - patient participated  Level of consciousness: awake and awake and alert  Pain score: 0  Nausea & Vomiting: no nausea and no vomiting  Complications: no  Cardiovascular status: hemodynamically stable and blood pressure returned to baseline  Respiratory status: acceptable  Hydration status: euvolemic  Multimodal analgesia pain management approach  Pain management: adequate

## 2023-10-16 NOTE — OP NOTE
Operative Note      Patient: Velasquez Novak  YOB: 1970  MRN: [id number]    Date of Procedure: 10/16/2023    Pre-Op Diagnosis Codes:     * Screening for colon cancer [Z12.11]    Post-Op Diagnosis:   Sigmoid colon polyp x2  Internal hemorrhoids  Poor bowel prep       Procedure(s):  Colonoscopy with Sigmoid Colon Polypectomy and Hot Snare Sigmoid Colon Polypectomy    Surgeon(s):  Lois Solano DO    Assistant:   Resident: Amrita Amanda DO    Anesthesia: Monitor Anesthesia Care    Estimated Blood Loss (mL): Minimal    Complications: None    Specimens:   ID Type Source Tests Collected by Time Destination   A : Sigmoid Colon Polyps Tissue Tissue SURGICAL PATHOLOGY Lois Solano DO 10/16/2023 5887        Implants:  * No implants in log *      Drains: * No LDAs found *    Findings: as above          Detailed Description of Procedure:     INDICATIONS FOR PROCEDURE:   The patient is a 48y.o. year old male with history of above preop diagnosis. Colonoscopy with possible biopsy or polypectomy was recommend, the risk, benefits, expected outcome, and alternatives to the procedure were explained. Risks included but are not limited to bleeding, infection, respiratory distress, hypotension, and perforation of the colon. The patient understands and is in agreement. PROCEDURE DETAILS:  Patient was brought to the endoscopy suite and placed in the left lateral recumbent position. A time out was completed verifying correct patient, procedure and equipment. Anesthesia was induced using MAC guidelines. A digital rectal exam was performed. The colonoscope was inserted into the rectum without difficulty and the scope was advanced to the cecum which was identified by anatomy and by palpation. Bowel prep was adequate. The scope was slowly withdrawn visualizing the cecum, ascending colon, transverse colon, descending colon, sigmoid colon and rectum. The scope was withdrawn to the rectal vault and then retroflexed.  The scope was then straightened and removed. The patient tolerated the procedure well and was transferred to the recovery unit in stable condition. Findings:    Polyps:   Sigmoid polyp 3mm removed with cold forcep. 1cm polyp removed with hot snare      Other findings:   internal hemorrhoids  Thick mucus throughout colon, some areas unable to be irrigated/suctioned fully, no other large lesions noted. Greater than 9 minutes was spent withdrawing the colonoscope. IMPRESSION/PLAN:   Increase dietary fiber and water  Patient is advised to have a repeat colonoscopy in 3 years unless otherwise dictated by pathology  Colonoscopy sooner if blood in the stool, unexplained weight loss, or change in the bowels  Findings were discussed with the patient's father-in-law who expresses understanding of this.         Electronically signed by Yamila Lowe DO on 10/16/2023 at 9:37 AM

## 2023-10-20 LAB — SURGICAL PATHOLOGY REPORT: NORMAL

## 2023-11-01 ENCOUNTER — HOSPITAL ENCOUNTER (OUTPATIENT)
Dept: SLEEP CENTER | Age: 53
Discharge: HOME | End: 2023-11-01
Payer: COMMERCIAL

## 2023-11-01 VITALS — BODY MASS INDEX: 30.88 KG/M2 | HEIGHT: 72 IN | WEIGHT: 228 LBS

## 2023-11-01 DIAGNOSIS — G47.33 OBSTRUCTIVE SLEEP APNEA SYNDROME: ICD-10-CM

## 2023-11-01 PROCEDURE — 95811 POLYSOM 6/>YRS CPAP 4/> PARM: CPT

## 2023-11-01 ASSESSMENT — SLEEP AND FATIGUE QUESTIONNAIRES
HOW LIKELY ARE YOU TO NOD OFF OR FALL ASLEEP WHILE SITTING AND READING: 0
HOW LIKELY ARE YOU TO NOD OFF OR FALL ASLEEP WHILE SITTING QUIETLY AFTER LUNCH WITHOUT ALCOHOL: 0
HAS ANYONE NOTICED THAT YOU QUIT BREATHING DURING SLEEP: 1-2 TIMES A WEEK
HOW LIKELY ARE YOU TO NOD OFF OR FALL ASLEEP WHILE SITTING AND TALKING TO SOMEONE: 0
USUAL AMOUNT OF TIME TO FALL ASLEEP (MIN): 90
HOW OFTEN DO YOU SNORE: 3-4 TIMES A WEEK
HAVE YOU EVER NODDED OFF OR FALLEN ASLEEP WHILE DRIVING: NO
DO YOU SNORE: YES
ESS TOTAL SCORE: 4
NORMAL AMOUNT OF SLEEP PER NIGHT: 6
WHAT TIME DO YOU USUALLY GO TO BED: 0
NUMBER OF TIMES YOU WAKE PER NIGHT: 3
WHAT TIME DO YOU USUALLY WAKE UP: 21600
HOW MANY NAPS DO YOU TAKE PER WEEK: 0
DOES YOUR SNORING BOTHER OTHERS: NO
HOW LIKELY ARE YOU TO NOD OFF OR FALL ASLEEP WHILE SITTING INACTIVE IN A PUBLIC PLACE: 0
DO YOU HAVE HIGH BLOOD PRESSURE: NO
HOW LIKELY ARE YOU TO NOD OFF OR FALL ASLEEP IN A CAR, WHILE STOPPED FOR A FEW MINUTES IN TRAFFIC: 0
HOW LIKELY ARE YOU TO NOD OFF OR FALL ASLEEP WHILE LYING DOWN TO REST IN THE AFTERNOON WHEN CIRCUMSTANCES PERMIT: 1
ARE YOU TIRED AFTER SLEEPING: ALMOST DAILY
SNORING VOLUME: LOUDER THAN TALKING
ARE YOU TIRED DURING WAKE TIME: ALMOST DAILY
HOW LIKELY ARE YOU TO NOD OFF OR FALL ASLEEP WHEN YOU ARE A PASSENGER IN A CAR FOR AN HOUR WITHOUT A BREAK: 1
HOW LIKELY ARE YOU TO NOD OFF OR FALL ASLEEP WHILE WATCHING TV: 2

## 2023-11-02 NOTE — PROGRESS NOTES
Patient arrived for diagnostic/split. Pt fitted with Dreamwear nasal pillows size small.  [facility] = [facility].

## 2023-11-07 LAB — STATUS: NORMAL

## 2023-11-13 DIAGNOSIS — G47.33 OSA (OBSTRUCTIVE SLEEP APNEA): Primary | ICD-10-CM

## 2023-11-15 PROBLEM — Z12.11 COLON CANCER SCREENING: Status: RESOLVED | Noted: 2023-10-16 | Resolved: 2023-11-15

## 2023-11-22 DIAGNOSIS — R06.2 WHEEZE: ICD-10-CM

## 2023-11-22 DIAGNOSIS — J44.9 CHRONIC OBSTRUCTIVE PULMONARY DISEASE, UNSPECIFIED COPD TYPE (HCC): ICD-10-CM

## 2023-11-24 RX ORDER — ALBUTEROL SULFATE 2.5 MG/3ML
SOLUTION RESPIRATORY (INHALATION)
Qty: 300 ML | Refills: 1 | Status: SHIPPED | OUTPATIENT
Start: 2023-11-24 | End: 2024-11-23

## 2023-11-24 NOTE — TELEPHONE ENCOUNTER
LOV  9-18-23  RTO ----  LRF 3-1-23          Controlled Substance Monitoring:    Acute and Chronic Pain Monitoring:   RX Monitoring Attestation Periodic Controlled Substance Monitoring   10/7/2016  11:41 AM The Prescription Monitoring Report for this patient was reviewed today. No signs of potential drug abuse or diversion identified.

## 2023-12-12 DIAGNOSIS — I10 ESSENTIAL HYPERTENSION: ICD-10-CM

## 2023-12-12 DIAGNOSIS — Z95.820 S/P ANGIOPLASTY WITH STENT: ICD-10-CM

## 2023-12-12 DIAGNOSIS — E78.2 MIXED HYPERLIPIDEMIA: ICD-10-CM

## 2023-12-12 DIAGNOSIS — Z87.898 HISTORY OF SYNCOPE: ICD-10-CM

## 2023-12-12 DIAGNOSIS — Z45.09 ENCOUNTER FOR LOOP RECORDER CHECK: ICD-10-CM

## 2023-12-12 DIAGNOSIS — I25.10 CORONARY ARTERY DISEASE INVOLVING NATIVE CORONARY ARTERY OF NATIVE HEART WITHOUT ANGINA PECTORIS: ICD-10-CM

## 2023-12-13 RX ORDER — NEBIVOLOL 10 MG/1
10 TABLET ORAL DAILY
Qty: 90 TABLET | Refills: 3 | Status: SHIPPED | OUTPATIENT
Start: 2023-12-13

## 2024-01-29 DIAGNOSIS — F33.1 MODERATE EPISODE OF RECURRENT MAJOR DEPRESSIVE DISORDER (HCC): ICD-10-CM

## 2024-01-30 NOTE — TELEPHONE ENCOUNTER
LOV 9/18/23 physical    RTO n/a  LRF 9/18/23          Controlled Substance Monitoring:    Acute and Chronic Pain Monitoring:   RX Monitoring Attestation Periodic Controlled Substance Monitoring   10/7/2016  11:41 AM The Prescription Monitoring Report for this patient was reviewed today. No signs of potential drug abuse or diversion identified.

## 2024-03-07 RX ORDER — SEMAGLUTIDE 1.34 MG/ML
0.25 INJECTION, SOLUTION SUBCUTANEOUS WEEKLY
Qty: 2 ADJUSTABLE DOSE PRE-FILLED PEN SYRINGE | Refills: 0 | COMMUNITY
Start: 2024-03-07

## 2024-03-07 NOTE — TELEPHONE ENCOUNTER
LOV 09/18/2023   RTO   LRF 12/19/2023 Mounjaro     Sample pen x2  Lot: [id number]  Exp: 06/2025  NDC: [id number]  Mfg: [employer]          Controlled Substance Monitoring:    Acute and Chronic Pain Monitoring:   RX Monitoring Attestation Periodic Controlled Substance Monitoring   10/7/2016  11:41 AM The Prescription Monitoring Report for this patient was reviewed today. No signs of potential drug abuse or diversion identified.

## 2024-03-17 DIAGNOSIS — E78.2 MIXED HYPERLIPIDEMIA: ICD-10-CM

## 2024-03-17 DIAGNOSIS — Z95.820 S/P ANGIOPLASTY WITH STENT: ICD-10-CM

## 2024-03-17 DIAGNOSIS — I10 ESSENTIAL HYPERTENSION: ICD-10-CM

## 2024-03-17 DIAGNOSIS — Z45.09 ENCOUNTER FOR LOOP RECORDER CHECK: ICD-10-CM

## 2024-03-17 DIAGNOSIS — Z87.898 HISTORY OF SYNCOPE: ICD-10-CM

## 2024-03-17 DIAGNOSIS — I25.10 CORONARY ARTERY DISEASE INVOLVING NATIVE CORONARY ARTERY OF NATIVE HEART WITHOUT ANGINA PECTORIS: ICD-10-CM

## 2024-03-18 RX ORDER — LOSARTAN POTASSIUM AND HYDROCHLOROTHIAZIDE 12.5; 5 MG/1; MG/1
1 TABLET ORAL DAILY
Qty: 90 TABLET | Refills: 3 | Status: SHIPPED | OUTPATIENT
Start: 2024-03-18

## 2024-03-18 RX ORDER — ASPIRIN 81 MG/1
81 TABLET, COATED ORAL DAILY
Qty: 90 TABLET | Refills: 3 | Status: SHIPPED | OUTPATIENT
Start: 2024-03-18

## 2024-08-07 ENCOUNTER — E-VISIT (OUTPATIENT)
Dept: FAMILY MEDICINE CLINIC | Age: 54
End: 2024-08-07
Payer: COMMERCIAL

## 2024-08-07 DIAGNOSIS — J21.9 ACUTE BRONCHIOLITIS DUE TO UNSPECIFIED ORGANISM: Primary | ICD-10-CM

## 2024-08-07 DIAGNOSIS — R05.1 ACUTE COUGH: ICD-10-CM

## 2024-08-07 DIAGNOSIS — R06.2 WHEEZE: ICD-10-CM

## 2024-08-07 DIAGNOSIS — J44.9 CHRONIC OBSTRUCTIVE PULMONARY DISEASE, UNSPECIFIED COPD TYPE (HCC): ICD-10-CM

## 2024-08-07 PROCEDURE — 99421 OL DIG E/M SVC 5-10 MIN: CPT | Performed by: NURSE PRACTITIONER

## 2024-08-07 RX ORDER — ALBUTEROL SULFATE 90 UG/1
2 AEROSOL, METERED RESPIRATORY (INHALATION) 4 TIMES DAILY PRN
Qty: 18 G | Refills: 0 | Status: SHIPPED | OUTPATIENT
Start: 2024-08-07

## 2024-08-07 RX ORDER — PREDNISONE 10 MG/1
10 TABLET ORAL 2 TIMES DAILY
Qty: 10 TABLET | Refills: 0 | Status: SHIPPED | OUTPATIENT
Start: 2024-08-07 | End: 2024-08-12

## 2024-08-07 RX ORDER — ALBUTEROL SULFATE 2.5 MG/3ML
SOLUTION RESPIRATORY (INHALATION)
Qty: 300 ML | Refills: 1 | Status: SHIPPED | OUTPATIENT
Start: 2024-08-07

## 2024-08-07 RX ORDER — AZITHROMYCIN 250 MG/1
TABLET, FILM COATED ORAL
Qty: 6 TABLET | Refills: 0 | Status: SHIPPED | OUTPATIENT
Start: 2024-08-07 | End: 2024-08-17

## 2024-08-07 ASSESSMENT — LIFESTYLE VARIABLES
SMOKING_STATUS: YES
SMOKING_YEARS: 35

## 2024-09-08 ENCOUNTER — PATIENT MESSAGE (OUTPATIENT)
Dept: FAMILY MEDICINE CLINIC | Age: 54
End: 2024-09-08

## 2024-09-08 DIAGNOSIS — Z72.0 TOBACCO ABUSE: Primary | ICD-10-CM

## 2024-09-26 RX ORDER — RIVAROXABAN 2.5 MG/1
2.5 TABLET, FILM COATED ORAL 2 TIMES DAILY
Qty: 180 TABLET | Refills: 3 | Status: SHIPPED | OUTPATIENT
Start: 2024-09-26

## 2024-10-14 ENCOUNTER — OFFICE VISIT (OUTPATIENT)
Dept: CARDIOLOGY | Age: 54
End: 2024-10-14
Payer: COMMERCIAL

## 2024-10-14 VITALS
BODY MASS INDEX: 30.77 KG/M2 | SYSTOLIC BLOOD PRESSURE: 133 MMHG | HEART RATE: 85 BPM | WEIGHT: 227.2 LBS | RESPIRATION RATE: 18 BRPM | HEIGHT: 72 IN | DIASTOLIC BLOOD PRESSURE: 83 MMHG | OXYGEN SATURATION: 98 %

## 2024-10-14 DIAGNOSIS — Z78.9 STATIN INTOLERANCE: ICD-10-CM

## 2024-10-14 DIAGNOSIS — I25.10 CORONARY ARTERY DISEASE INVOLVING NATIVE CORONARY ARTERY OF NATIVE HEART WITHOUT ANGINA PECTORIS: Primary | ICD-10-CM

## 2024-10-14 DIAGNOSIS — I10 ESSENTIAL HYPERTENSION: ICD-10-CM

## 2024-10-14 DIAGNOSIS — Z87.898 HISTORY OF SYNCOPE: ICD-10-CM

## 2024-10-14 DIAGNOSIS — E78.2 MIXED HYPERLIPIDEMIA: ICD-10-CM

## 2024-10-14 PROCEDURE — 99214 OFFICE O/P EST MOD 30 MIN: CPT | Performed by: FAMILY MEDICINE

## 2024-10-14 PROCEDURE — 3079F DIAST BP 80-89 MM HG: CPT | Performed by: FAMILY MEDICINE

## 2024-10-14 PROCEDURE — 93000 ELECTROCARDIOGRAM COMPLETE: CPT | Performed by: FAMILY MEDICINE

## 2024-10-14 PROCEDURE — 3075F SYST BP GE 130 - 139MM HG: CPT | Performed by: FAMILY MEDICINE

## 2024-10-14 RX ORDER — ROSUVASTATIN CALCIUM 10 MG/1
10 TABLET, COATED ORAL DAILY
Qty: 90 TABLET | Refills: 3 | Status: SHIPPED | OUTPATIENT
Start: 2024-10-14

## 2024-10-14 NOTE — PROGRESS NOTES
I, Elizabeth Hardy am scribing for and in the presence of Cesar Bey MD, MS, F.A.C.C..    Patient: James Pratt  : 1970  Date of Visit: 2024    REASON FOR VISIT / CONSULTATION: Coronary Artery Disease (HX:CAD, s/p stnet, syncope, HTN PT is here for yearly follow up he states he is doing well Denies:CP, sob, lightheaded/dizziness,palp )    History of Present Illness:        Dear Dionna Ferrera, APRN - CNP,     I had the pleasure of seeing James Pratt in my office today. Mr. Pratt is a 54 y.o. male with a history of hypertension. He does have stenting in his RCA, 2 or 3 in  and another one in  with Dr. Wetzel. At that time he says his symptoms were feeling run down without any chest pain or pressure but he did say he had some mild discomfort in his chest. He also reports a longstanding history of hypertension which has been on and off controlled. He recently was in the hospital due to having a syncopal episode. He was driving and started having bright white in his vision. He pulled over and his wife started driving. Once he got into the passenger seat and she started driving and it happened again. That was the first time it had ever happened and it has not happened since. He was told it could have happened due to dehydration. He underwent extensive testing at that time including a stress test, ECG and echocardiogram and all were largely unremarkable other than mild left ventricular hypertrophy. He had a second episode of passing out in 2021 at a barbershop. He did not have any chest pain, pressure, palpitations, or shortness of breath.  He did not have any lightheadedness or dizziness associated with it.  He states he was getting his haircut and when she turned him around in the chair to tell him that he was done he was slumped over in the chair and not responsive.  He did receive a minute and a half of CPR. Following this he underwent a LINQ Loop placed on 2021.

## 2024-10-22 ENCOUNTER — HOSPITAL ENCOUNTER (OUTPATIENT)
Dept: CT IMAGING | Age: 54
Discharge: HOME | End: 2024-10-24
Payer: COMMERCIAL

## 2024-10-22 DIAGNOSIS — Z72.0 TOBACCO ABUSE: ICD-10-CM

## 2024-10-22 PROCEDURE — 71271 CT THORAX LUNG CANCER SCR C-: CPT

## 2024-10-25 NOTE — RESULT ENCOUNTER NOTE
[facility]    Ct lung screening with no lung nodules

## 2024-11-18 ASSESSMENT — PATIENT HEALTH QUESTIONNAIRE - PHQ9
9. THOUGHTS THAT YOU WOULD BE BETTER OFF DEAD, OR OF HURTING YOURSELF: NOT AT ALL
4. FEELING TIRED OR HAVING LITTLE ENERGY: NEARLY EVERY DAY
2. FEELING DOWN, DEPRESSED OR HOPELESS: MORE THAN HALF THE DAYS
SUM OF ALL RESPONSES TO PHQ QUESTIONS 1-9: 16
5. POOR APPETITE OR OVEREATING: NOT AT ALL
6. FEELING BAD ABOUT YOURSELF - OR THAT YOU ARE A FAILURE OR HAVE LET YOURSELF OR YOUR FAMILY DOWN: NEARLY EVERY DAY
7. TROUBLE CONCENTRATING ON THINGS, SUCH AS READING THE NEWSPAPER OR WATCHING TELEVISION: NEARLY EVERY DAY
5. POOR APPETITE OR OVEREATING: NOT AT ALL
10. IF YOU CHECKED OFF ANY PROBLEMS, HOW DIFFICULT HAVE THESE PROBLEMS MADE IT FOR YOU TO DO YOUR WORK, TAKE CARE OF THINGS AT HOME, OR GET ALONG WITH OTHER PEOPLE: NOT DIFFICULT AT ALL
SUM OF ALL RESPONSES TO PHQ QUESTIONS 1-9: 16
7. TROUBLE CONCENTRATING ON THINGS, SUCH AS READING THE NEWSPAPER OR WATCHING TELEVISION: NEARLY EVERY DAY
8. MOVING OR SPEAKING SO SLOWLY THAT OTHER PEOPLE COULD HAVE NOTICED. OR THE OPPOSITE - BEING SO FIDGETY OR RESTLESS THAT YOU HAVE BEEN MOVING AROUND A LOT MORE THAN USUAL: NOT AT ALL
1. LITTLE INTEREST OR PLEASURE IN DOING THINGS: MORE THAN HALF THE DAYS
3. TROUBLE FALLING OR STAYING ASLEEP: NEARLY EVERY DAY
SUM OF ALL RESPONSES TO PHQ QUESTIONS 1-9: 16
SUM OF ALL RESPONSES TO PHQ QUESTIONS 1-9: 16
8. MOVING OR SPEAKING SO SLOWLY THAT OTHER PEOPLE COULD HAVE NOTICED. OR THE OPPOSITE, BEING SO FIGETY OR RESTLESS THAT YOU HAVE BEEN MOVING AROUND A LOT MORE THAN USUAL: NOT AT ALL
1. LITTLE INTEREST OR PLEASURE IN DOING THINGS: MORE THAN HALF THE DAYS
SUM OF ALL RESPONSES TO PHQ QUESTIONS 1-9: 16
9. THOUGHTS THAT YOU WOULD BE BETTER OFF DEAD, OR OF HURTING YOURSELF: NOT AT ALL
2. FEELING DOWN, DEPRESSED OR HOPELESS: MORE THAN HALF THE DAYS
3. TROUBLE FALLING OR STAYING ASLEEP: NEARLY EVERY DAY
SUM OF ALL RESPONSES TO PHQ9 QUESTIONS 1 & 2: 4
6. FEELING BAD ABOUT YOURSELF - OR THAT YOU ARE A FAILURE OR HAVE LET YOURSELF OR YOUR FAMILY DOWN: NEARLY EVERY DAY
10. IF YOU CHECKED OFF ANY PROBLEMS, HOW DIFFICULT HAVE THESE PROBLEMS MADE IT FOR YOU TO DO YOUR WORK, TAKE CARE OF THINGS AT HOME, OR GET ALONG WITH OTHER PEOPLE: NOT DIFFICULT AT ALL
4. FEELING TIRED OR HAVING LITTLE ENERGY: NEARLY EVERY DAY

## 2024-11-19 ENCOUNTER — OFFICE VISIT (OUTPATIENT)
Dept: FAMILY MEDICINE CLINIC | Age: 54
End: 2024-11-19

## 2024-11-19 ENCOUNTER — HOSPITAL ENCOUNTER (OUTPATIENT)
Age: 54
Setting detail: SPECIMEN
Discharge: HOME | End: 2024-11-19

## 2024-11-19 VITALS
DIASTOLIC BLOOD PRESSURE: 96 MMHG | HEIGHT: 71 IN | HEART RATE: 62 BPM | SYSTOLIC BLOOD PRESSURE: 154 MMHG | OXYGEN SATURATION: 96 % | BODY MASS INDEX: 31.58 KG/M2 | WEIGHT: 225.6 LBS | RESPIRATION RATE: 14 BRPM | TEMPERATURE: 97.6 F

## 2024-11-19 DIAGNOSIS — J44.9 CHRONIC OBSTRUCTIVE PULMONARY DISEASE, UNSPECIFIED COPD TYPE (HCC): ICD-10-CM

## 2024-11-19 DIAGNOSIS — Z72.0 TOBACCO ABUSE: ICD-10-CM

## 2024-11-19 DIAGNOSIS — R79.89 ELEVATED FERRITIN: ICD-10-CM

## 2024-11-19 DIAGNOSIS — G89.29 CHRONIC RIGHT SHOULDER PAIN: ICD-10-CM

## 2024-11-19 DIAGNOSIS — E78.1 HYPERTRIGLYCERIDEMIA: ICD-10-CM

## 2024-11-19 DIAGNOSIS — I10 PRIMARY HYPERTENSION: ICD-10-CM

## 2024-11-19 DIAGNOSIS — I20.0 UNSTABLE ANGINA (HCC): ICD-10-CM

## 2024-11-19 DIAGNOSIS — Z78.9 STATIN INTOLERANCE: ICD-10-CM

## 2024-11-19 DIAGNOSIS — Z13.31 SCREENING FOR DEPRESSION: ICD-10-CM

## 2024-11-19 DIAGNOSIS — Z12.5 SCREENING FOR PROSTATE CANCER: ICD-10-CM

## 2024-11-19 DIAGNOSIS — E11.9 TYPE 2 DIABETES MELLITUS WITHOUT COMPLICATION, WITHOUT LONG-TERM CURRENT USE OF INSULIN (HCC): ICD-10-CM

## 2024-11-19 DIAGNOSIS — Z00.00 ANNUAL PHYSICAL EXAM: ICD-10-CM

## 2024-11-19 DIAGNOSIS — F41.9 ANXIETY: ICD-10-CM

## 2024-11-19 DIAGNOSIS — F33.1 MODERATE EPISODE OF RECURRENT MAJOR DEPRESSIVE DISORDER (HCC): ICD-10-CM

## 2024-11-19 DIAGNOSIS — Z00.00 ANNUAL PHYSICAL EXAM: Primary | ICD-10-CM

## 2024-11-19 DIAGNOSIS — M25.511 CHRONIC RIGHT SHOULDER PAIN: ICD-10-CM

## 2024-11-19 DIAGNOSIS — I46.9 CARDIAC ARREST: ICD-10-CM

## 2024-11-19 LAB
ALBUMIN SERPL-MCNC: 4.7 G/DL (ref 3.5–5.2)
ALBUMIN/GLOB SERPL: 1.7 {RATIO} (ref 1–2.5)
ALP SERPL-CCNC: 54 U/L (ref 40–129)
ALT SERPL-CCNC: 22 U/L (ref 10–50)
ANION GAP SERPL CALCULATED.3IONS-SCNC: 14 MMOL/L (ref 9–16)
AST SERPL-CCNC: 25 U/L (ref 10–50)
BASOPHILS # BLD: 0.06 K/UL (ref 0–0.2)
BASOPHILS NFR BLD: 1 % (ref 0–2)
BILIRUB SERPL-MCNC: 0.5 MG/DL (ref 0–1.2)
BUN SERPL-MCNC: 20 MG/DL (ref 6–20)
CALCIUM SERPL-MCNC: 9.5 MG/DL (ref 8.6–10.4)
CHLORIDE SERPL-SCNC: 95 MMOL/L (ref 98–107)
CHOLEST SERPL-MCNC: 160 MG/DL (ref 0–199)
CHOLESTEROL/HDL RATIO: 2.2
CO2 SERPL-SCNC: 25 MMOL/L (ref 20–31)
CREAT SERPL-MCNC: 0.8 MG/DL (ref 0.7–1.2)
EOSINOPHIL # BLD: 0.06 K/UL (ref 0–0.44)
EOSINOPHILS RELATIVE PERCENT: 1 % (ref 1–4)
ERYTHROCYTE [DISTWIDTH] IN BLOOD BY AUTOMATED COUNT: 12.5 % (ref 11.8–14.4)
FERRITIN SERPL-MCNC: 2825 NG/ML (ref 30–400)
GFR, ESTIMATED: >90 ML/MIN/1.73M2
GLUCOSE P FAST SERPL-MCNC: 136 MG/DL (ref 74–99)
HCT VFR BLD AUTO: 47 % (ref 40.7–50.3)
HDLC SERPL-MCNC: 73 MG/DL
HGB BLD-MCNC: 16.1 G/DL (ref 13–17)
IMM GRANULOCYTES # BLD AUTO: <0.03 K/UL (ref 0–0.3)
IMM GRANULOCYTES NFR BLD: 0 %
IRON SATN MFR SERPL: 49 % (ref 20–55)
IRON SERPL-MCNC: 178 UG/DL (ref 61–157)
LDLC SERPL CALC-MCNC: 36 MG/DL (ref 0–100)
LYMPHOCYTES NFR BLD: 2.21 K/UL (ref 1.1–3.7)
LYMPHOCYTES RELATIVE PERCENT: 33 % (ref 24–43)
MAGNESIUM SERPL-MCNC: 1.8 MG/DL (ref 1.6–2.6)
MCH RBC QN AUTO: 32.2 PG (ref 25.2–33.5)
MCHC RBC AUTO-ENTMCNC: 34.3 G/DL (ref 28.4–34.8)
MCV RBC AUTO: 94 FL (ref 82.6–102.9)
MONOCYTES NFR BLD: 0.34 K/UL (ref 0.1–1.2)
MONOCYTES NFR BLD: 5 % (ref 3–12)
NEUTROPHILS NFR BLD: 60 % (ref 36–65)
NEUTS SEG NFR BLD: 3.96 K/UL (ref 1.5–8.1)
NRBC BLD-RTO: 0 PER 100 WBC
PLATELET # BLD AUTO: 251 K/UL (ref 138–453)
PMV BLD AUTO: 10.8 FL (ref 8.1–13.5)
POTASSIUM SERPL-SCNC: 4.3 MMOL/L (ref 3.7–5.3)
PROT SERPL-MCNC: 7.4 G/DL (ref 6.6–8.7)
PSA SERPL-MCNC: 0.2 NG/ML (ref 0–4)
RBC # BLD AUTO: 5 M/UL (ref 4.21–5.77)
SODIUM SERPL-SCNC: 134 MMOL/L (ref 136–145)
T4 FREE SERPL-MCNC: 1.3 NG/DL (ref 0.92–1.68)
TIBC SERPL-MCNC: 364 UG/DL (ref 250–450)
TRIGL SERPL-MCNC: 255 MG/DL
TSH SERPL DL<=0.05 MIU/L-ACNC: 1 UIU/ML (ref 0.27–4.2)
UNSATURATED IRON BINDING CAPACITY: 186 UG/DL (ref 112–347)
VLDLC SERPL CALC-MCNC: 51 MG/DL (ref 1–30)
WBC OTHER # BLD: 6.7 K/UL (ref 3.5–11.3)

## 2024-11-19 NOTE — PROGRESS NOTES
[Post-hospitalization from ___ Hospital] : Post-hospitalization from [unfilled] Hospital [Admitted on: ___] : The patient was admitted on [unfilled] [Discharged on ___] : discharged on [unfilled] ([facility])  Assessment & Plan:   Chronic, at goal (stable), continue current treatment plan  3. Type 2 diabetes mellitus without complication, without long-term current use of insulin (HCC)  -     Tirzepatide 2.5 MG/0.5ML SOAJ; Inject 2.5 mg into the skin once a week Lot [id number] exp 6/26/2023, Disp-2 mL, R-1Sample  -     Tirzepatide 5 MG/0.5ML SOAJ; Inject 5 mg into the skin once a week, Disp-6 mL, R-1Normal  4. Hypertriglyceridemia  -     Lipid Panel; Future  5. Cardiac arrest  6. Chronic obstructive pulmonary disease, unspecified COPD type (HCC)  -     CBC with Auto Differential; Future  7. Chronic right shoulder pain  -     XR SHOULDER RIGHT (MIN 2 VIEWS); Future  8. Unstable angina (HCC)  9. Anxiety  -     T4, Free; Future  -     TSH; Future  10. Statin intolerance  -     Lipid Panel; Future  11. Tobacco abuse  12. Primary hypertension  13. Elevated ferritin  -     CBC with Auto Differential; Future  -     Ferritin; Future  -     Iron and TIBC; Future  14. Screening for depression  15. Screening for prostate cancer  -     PSA Screening; Future     Routine labs  Imaging to right shoulder.  Per results patient may benefit from PT referral?  Ortho referral for potential joint injection?  Or additional imaging such as MRI?  Start GLP-1 for weight loss, diabetes, and cardiac and kidney health  Patient is encouraged to continue and follow with sleep medicine is unaware patient not wearing his machine?  Did encourage patient to try at low-dose statin.  Discussed potential injections, however those must be ordered by cardiology    Electronically signed by GILMA Monae CNP, APRN-CNP on 11/25/2024 at 9:20 AM    Please note that this chart was generated using voice recognition Dragon dictation software.  Although every effort was made to ensure the accuracy of this automated transcription, some errors in transcription may have occurred.   [Discharge Summary] : discharge summary [Pertinent Labs] : pertinent labs [Radiology Findings] : radiology findings [Discharge Med List] : discharge medication list [Med Reconciliation] : medication reconciliation has been completed [Patient Contacted By: ____] : and contacted by [unfilled] [FreeTextEntry2] : Pt admitted with R sided PNA x 24hours.\par Given ABx- d/c'd on Cefpodoxime.\par Back at work. SOB controlled but still has minor dry cough.\par Started PT for back on Saturday. Hoping it helps.

## 2024-11-25 ASSESSMENT — VISUAL ACUITY: OU: 1

## 2024-12-06 ENCOUNTER — TELEPHONE (OUTPATIENT)
Dept: FAMILY MEDICINE CLINIC | Age: 54
End: 2024-12-06

## 2024-12-06 DIAGNOSIS — R73.01 IMPAIRED FASTING GLUCOSE: Primary | ICD-10-CM

## 2024-12-06 NOTE — TELEPHONE ENCOUNTER
Patient wondering if Wegovy 2.5 samples can be given he is nervous about starting the higher dose of Mounjaro due to some sulfur gas noted.    Two doses pended if approval.

## 2024-12-11 DIAGNOSIS — R79.89 ELEVATED FERRITIN: ICD-10-CM

## 2024-12-11 DIAGNOSIS — E78.1 HYPERTRIGLYCERIDEMIA: Primary | Chronic | ICD-10-CM

## 2024-12-11 DIAGNOSIS — F10.10 ALCOHOL ABUSE: ICD-10-CM

## 2024-12-11 DIAGNOSIS — E11.9 TYPE 2 DIABETES MELLITUS WITHOUT COMPLICATION, WITHOUT LONG-TERM CURRENT USE OF INSULIN (HCC): ICD-10-CM

## 2024-12-13 DIAGNOSIS — R79.89 HIGH SERUM FERRITIN: Primary | ICD-10-CM

## 2025-02-27 DIAGNOSIS — Z45.09 ENCOUNTER FOR LOOP RECORDER CHECK: ICD-10-CM

## 2025-02-27 DIAGNOSIS — Z95.820 S/P ANGIOPLASTY WITH STENT: ICD-10-CM

## 2025-02-27 DIAGNOSIS — I10 ESSENTIAL HYPERTENSION: ICD-10-CM

## 2025-02-27 DIAGNOSIS — Z87.898 HISTORY OF SYNCOPE: ICD-10-CM

## 2025-02-27 DIAGNOSIS — I25.10 CORONARY ARTERY DISEASE INVOLVING NATIVE CORONARY ARTERY OF NATIVE HEART WITHOUT ANGINA PECTORIS: ICD-10-CM

## 2025-02-27 DIAGNOSIS — E78.2 MIXED HYPERLIPIDEMIA: ICD-10-CM

## 2025-02-27 RX ORDER — NEBIVOLOL 10 MG/1
10 TABLET ORAL DAILY
Qty: 90 TABLET | Refills: 3 | Status: SHIPPED | OUTPATIENT
Start: 2025-02-27

## 2025-05-30 DIAGNOSIS — F33.1 MODERATE EPISODE OF RECURRENT MAJOR DEPRESSIVE DISORDER (HCC): ICD-10-CM

## 2025-05-30 NOTE — TELEPHONE ENCOUNTER
Patient requesting refill. Will schedule 6 month F/U. Please send temporary supply to [facility].     LOV 11/19/24  RTO 6 months  LRF 9/26/24    Health Maintenance   Topic Date Due    Hepatitis C screen  Never done    Hepatitis B vaccine (1 of 3 - 19+ 3-dose series) Never done    Pneumococcal 50+ years Vaccine (2 of 2 - PCV) 08/07/2014    Shingles vaccine (1 of 2) Never done    A1C test (Diabetic or Prediabetic)  12/18/2023    COVID-19 Vaccine (5 - 2024-25 season) 09/01/2024    Diabetic Alb to Cr ratio (uACR) test  09/18/2024    Flu vaccine (Season Ended) 11/19/2025 (Originally 8/1/2025)    Lung Cancer Screening &/or Counseling  10/22/2025    Depression Monitoring  11/18/2025    Lipids  11/19/2025    GFR test (Diabetes, CKD 3-4, OR last GFR 15-59)  11/19/2025    DTaP/Tdap/Td vaccine (2 - Td or Tdap) 10/23/2027    Colorectal Cancer Screen  10/16/2028    HIV screen  Completed    Hepatitis A vaccine  Aged Out    Hib vaccine  Aged Out    Polio vaccine  Aged Out    Meningococcal (ACWY) vaccine  Aged Out    Meningococcal B vaccine  Aged Out    Depression Screen  Discontinued    Pneumococcal 0-49 years Vaccine  Discontinued             (applicable per patient's age: Cancer Screenings, Depression Screening, Fall Risk Screening, Immunizations)    Hemoglobin A1C (%)   Date Value   09/18/2023 6.9 (H)   11/09/2021 5.9   09/22/2021 6.0     AST (U/L)   Date Value   11/19/2024 25     ALT (U/L)   Date Value   11/19/2024 22     BUN (mg/dL)   Date Value   11/19/2024 20      (goal A1C is < 7)   (goal LDL is <100) need 30-50% reduction from baseline     BP Readings from Last 3 Encounters:   11/19/24 (!) 154/96   10/14/24 133/83   10/16/23 (!) 151/94    (goal /80)      All Future Testing planned in [facility]:  Lab Frequency Next Occurrence   XR SHOULDER RIGHT (MIN 2 VIEWS) Once 11/19/2024   HERED. HEMOCHROMATOSIS, DNA Once 12/11/2024   Path Review, Smear Once 12/11/2024   Hemoglobin A1C Once 12/11/2024   Ammonia Once 12/13/2024

## 2025-06-04 ENCOUNTER — APPOINTMENT (OUTPATIENT)
Dept: CT IMAGING | Age: 55
DRG: 897 | End: 2025-06-04
Payer: COMMERCIAL

## 2025-06-04 ENCOUNTER — APPOINTMENT (OUTPATIENT)
Dept: GENERAL RADIOLOGY | Age: 55
DRG: 897 | End: 2025-06-04
Payer: COMMERCIAL

## 2025-06-04 ENCOUNTER — HOSPITAL ENCOUNTER (INPATIENT)
Age: 55
LOS: 1 days | Discharge: HOME | DRG: 897 | End: 2025-06-05
Attending: INTERNAL MEDICINE | Admitting: INTERNAL MEDICINE
Payer: COMMERCIAL

## 2025-06-04 DIAGNOSIS — F33.1 MODERATE EPISODE OF RECURRENT MAJOR DEPRESSIVE DISORDER (HCC): ICD-10-CM

## 2025-06-04 DIAGNOSIS — F32.A DEPRESSION, UNSPECIFIED DEPRESSION TYPE: Primary | ICD-10-CM

## 2025-06-04 DIAGNOSIS — F10.920 ACUTE ALCOHOLIC INTOXICATION WITHOUT COMPLICATION: ICD-10-CM

## 2025-06-04 PROBLEM — I95.9 HYPOTENSIVE EPISODE: Status: RESOLVED | Noted: 2021-10-23 | Resolved: 2025-06-04

## 2025-06-04 PROBLEM — Z72.0 TOBACCO ABUSE: Chronic | Status: ACTIVE | Noted: 2017-07-01

## 2025-06-04 PROBLEM — Z78.9 STATIN INTOLERANCE: Chronic | Status: ACTIVE | Noted: 2019-11-26

## 2025-06-04 PROBLEM — F33.2 SEVERE EPISODE OF RECURRENT MAJOR DEPRESSIVE DISORDER (HCC): Status: ACTIVE | Noted: 2024-11-19

## 2025-06-04 PROBLEM — R07.2 PRECORDIAL CHEST PAIN: Status: RESOLVED | Noted: 2017-07-01 | Resolved: 2025-06-04

## 2025-06-04 PROBLEM — J20.9 ACUTE BRONCHITIS: Status: RESOLVED | Noted: 2017-07-02 | Resolved: 2025-06-04

## 2025-06-04 PROBLEM — K85.90 ACUTE PANCREATITIS: Status: RESOLVED | Noted: 2019-08-16 | Resolved: 2025-06-04

## 2025-06-04 PROBLEM — I51.7 MILD LEFT VENTRICULAR HYPERTROPHY: Chronic | Status: ACTIVE | Noted: 2022-12-07

## 2025-06-04 PROBLEM — N17.9 AKI (ACUTE KIDNEY INJURY): Status: RESOLVED | Noted: 2021-10-23 | Resolved: 2025-06-04

## 2025-06-04 PROBLEM — I20.0 UNSTABLE ANGINA (HCC): Status: RESOLVED | Noted: 2017-07-01 | Resolved: 2025-06-04

## 2025-06-04 PROBLEM — R45.851 SUICIDAL IDEATION: Status: ACTIVE | Noted: 2025-06-04

## 2025-06-04 LAB
ALBUMIN SERPL-MCNC: 4.6 G/DL (ref 3.5–5.2)
ALBUMIN/GLOB SERPL: 1.7 {RATIO} (ref 1–2.5)
ALP SERPL-CCNC: 65 U/L (ref 40–129)
ALT SERPL-CCNC: 43 U/L (ref 10–50)
AMPHET UR QL SCN: NEGATIVE
ANION GAP SERPL CALCULATED.3IONS-SCNC: 17 MMOL/L (ref 9–16)
APAP SERPL-MCNC: <5 UG/ML (ref 10–30)
AST SERPL-CCNC: 59 U/L (ref 10–50)
BACTERIA URNS QL MICRO: ABNORMAL
BARBITURATES UR QL SCN: NEGATIVE
BASOPHILS # BLD: 0.05 K/UL (ref 0–0.2)
BASOPHILS NFR BLD: 1 % (ref 0–2)
BENZODIAZ UR QL: NEGATIVE
BILIRUB SERPL-MCNC: 0.5 MG/DL (ref 0–1.2)
BILIRUB UR QL STRIP: NEGATIVE
BNP SERPL-MCNC: 41 PG/ML (ref 0–125)
BUN SERPL-MCNC: 11 MG/DL (ref 6–20)
BUN/CREAT SERPL: 16 (ref 9–20)
CALCIUM SERPL-MCNC: 9 MG/DL (ref 8.6–10.4)
CANNABINOIDS UR QL SCN: NEGATIVE
CASTS #/AREA URNS LPF: ABNORMAL /LPF
CHLORIDE SERPL-SCNC: 98 MMOL/L (ref 98–107)
CLARITY UR: CLEAR
CO2 SERPL-SCNC: 21 MMOL/L (ref 20–31)
COCAINE UR QL SCN: NEGATIVE
COLOR UR: YELLOW
CREAT SERPL-MCNC: 0.7 MG/DL (ref 0.7–1.2)
EOSINOPHIL # BLD: <0.03 K/UL (ref 0–0.44)
EOSINOPHILS RELATIVE PERCENT: 0 % (ref 1–4)
EPI CELLS #/AREA URNS HPF: ABNORMAL /HPF (ref 0–5)
ERYTHROCYTE [DISTWIDTH] IN BLOOD BY AUTOMATED COUNT: 12.9 % (ref 11.8–14.4)
ETHANOL PERCENT: 0.04 %
ETHANOL PERCENT: 0.13 %
ETHANOLAMINE SERPL-MCNC: 133 MG/DL (ref 0–0.08)
ETHANOLAMINE SERPL-MCNC: 42 MG/DL (ref 0–0.08)
FENTANYL UR QL: NEGATIVE
GFR, ESTIMATED: >90 ML/MIN/1.73M2
GLUCOSE SERPL-MCNC: 119 MG/DL (ref 74–99)
GLUCOSE UR STRIP-MCNC: NEGATIVE MG/DL
HCT VFR BLD AUTO: 42.9 % (ref 40.7–50.3)
HGB BLD-MCNC: 14.9 G/DL (ref 13–17)
HGB UR QL STRIP.AUTO: NEGATIVE
IMM GRANULOCYTES # BLD AUTO: <0.03 K/UL (ref 0–0.3)
IMM GRANULOCYTES NFR BLD: 0 %
KETONES UR STRIP-MCNC: ABNORMAL MG/DL
LEUKOCYTE ESTERASE UR QL STRIP: NEGATIVE
LYMPHOCYTES NFR BLD: 1.88 K/UL (ref 1.1–3.7)
LYMPHOCYTES RELATIVE PERCENT: 32 % (ref 24–43)
MAGNESIUM SERPL-MCNC: 1.8 MG/DL (ref 1.6–2.6)
MCH RBC QN AUTO: 33.1 PG (ref 25.2–33.5)
MCHC RBC AUTO-ENTMCNC: 34.7 G/DL (ref 28.4–34.8)
MCV RBC AUTO: 95.3 FL (ref 82.6–102.9)
METHADONE UR QL: NEGATIVE
MONOCYTES NFR BLD: 0.25 K/UL (ref 0.1–1.2)
MONOCYTES NFR BLD: 4 % (ref 3–12)
MUCOUS THREADS URNS QL MICRO: ABNORMAL
NEUTROPHILS NFR BLD: 63 % (ref 36–65)
NEUTS SEG NFR BLD: 3.61 K/UL (ref 1.5–8.1)
NITRITE UR QL STRIP: NEGATIVE
NRBC BLD-RTO: 0 PER 100 WBC
OPIATES UR QL SCN: NEGATIVE
OXYCODONE UR QL SCN: NEGATIVE
PCP UR QL SCN: NEGATIVE
PH UR STRIP: 6 [PH] (ref 5–9)
PLATELET # BLD AUTO: 186 K/UL (ref 138–453)
PMV BLD AUTO: 10.3 FL (ref 8.1–13.5)
POTASSIUM SERPL-SCNC: 4.1 MMOL/L (ref 3.7–5.3)
PROT SERPL-MCNC: 7.2 G/DL (ref 6.6–8.7)
PROT UR STRIP-MCNC: ABNORMAL MG/DL
RBC # BLD AUTO: 4.5 M/UL (ref 4.21–5.77)
RBC #/AREA URNS HPF: ABNORMAL /HPF (ref 0–2)
SALICYLATES SERPL-MCNC: <0.5 MG/DL (ref 0–10)
SODIUM SERPL-SCNC: 136 MMOL/L (ref 136–145)
SP GR UR STRIP: >1.03 (ref 1.01–1.02)
TEST INFORMATION: NORMAL
TSH SERPL DL<=0.05 MIU/L-ACNC: 1.23 UIU/ML (ref 0.27–4.2)
UROBILINOGEN UR STRIP-ACNC: NORMAL EU/DL (ref 0–1)
WBC #/AREA URNS HPF: ABNORMAL /HPF (ref 0–5)
WBC OTHER # BLD: 5.8 K/UL (ref 3.5–11.3)

## 2025-06-04 PROCEDURE — 96365 THER/PROPH/DIAG IV INF INIT: CPT

## 2025-06-04 PROCEDURE — 1200000000 HC SEMI PRIVATE

## 2025-06-04 PROCEDURE — 81001 URINALYSIS AUTO W/SCOPE: CPT

## 2025-06-04 PROCEDURE — 84443 ASSAY THYROID STIM HORMONE: CPT

## 2025-06-04 PROCEDURE — 80053 COMPREHEN METABOLIC PANEL: CPT

## 2025-06-04 PROCEDURE — 6360000002 HC RX W HCPCS: Performed by: PHYSICIAN ASSISTANT

## 2025-06-04 PROCEDURE — 93005 ELECTROCARDIOGRAM TRACING: CPT | Performed by: INTERNAL MEDICINE

## 2025-06-04 PROCEDURE — 80179 DRUG ASSAY SALICYLATE: CPT

## 2025-06-04 PROCEDURE — 70450 CT HEAD/BRAIN W/O DYE: CPT

## 2025-06-04 PROCEDURE — 2580000003 HC RX 258: Performed by: INTERNAL MEDICINE

## 2025-06-04 PROCEDURE — 71045 X-RAY EXAM CHEST 1 VIEW: CPT

## 2025-06-04 PROCEDURE — 94761 N-INVAS EAR/PLS OXIMETRY MLT: CPT

## 2025-06-04 PROCEDURE — 80143 DRUG ASSAY ACETAMINOPHEN: CPT

## 2025-06-04 PROCEDURE — 80307 DRUG TEST PRSMV CHEM ANLYZR: CPT

## 2025-06-04 PROCEDURE — G0480 DRUG TEST DEF 1-7 CLASSES: HCPCS

## 2025-06-04 PROCEDURE — 83880 ASSAY OF NATRIURETIC PEPTIDE: CPT

## 2025-06-04 PROCEDURE — 2580000003 HC RX 258: Performed by: PHYSICIAN ASSISTANT

## 2025-06-04 PROCEDURE — 6370000000 HC RX 637 (ALT 250 FOR IP)

## 2025-06-04 PROCEDURE — 6370000000 HC RX 637 (ALT 250 FOR IP): Performed by: INTERNAL MEDICINE

## 2025-06-04 PROCEDURE — 85025 COMPLETE CBC W/AUTO DIFF WBC: CPT

## 2025-06-04 PROCEDURE — 83735 ASSAY OF MAGNESIUM: CPT

## 2025-06-04 PROCEDURE — 99285 EMERGENCY DEPT VISIT HI MDM: CPT

## 2025-06-04 PROCEDURE — 96375 TX/PRO/DX INJ NEW DRUG ADDON: CPT

## 2025-06-04 RX ORDER — SERTRALINE HYDROCHLORIDE 25 MG/1
50 TABLET, FILM COATED ORAL DAILY
Status: DISCONTINUED | OUTPATIENT
Start: 2025-06-04 | End: 2025-06-05 | Stop reason: HOSPADM

## 2025-06-04 RX ORDER — METOPROLOL SUCCINATE 50 MG/1
50 TABLET, EXTENDED RELEASE ORAL DAILY
Status: DISCONTINUED | OUTPATIENT
Start: 2025-06-05 | End: 2025-06-04

## 2025-06-04 RX ORDER — ONDANSETRON 4 MG/1
4 TABLET, ORALLY DISINTEGRATING ORAL EVERY 8 HOURS PRN
Status: DISCONTINUED | OUTPATIENT
Start: 2025-06-04 | End: 2025-06-05 | Stop reason: HOSPADM

## 2025-06-04 RX ORDER — ESOMEPRAZOLE MAGNESIUM 40 MG/1
40 GRANULE, DELAYED RELEASE ORAL DAILY
Status: DISCONTINUED | OUTPATIENT
Start: 2025-06-05 | End: 2025-06-04

## 2025-06-04 RX ORDER — LORAZEPAM 2 MG/ML
4 INJECTION INTRAMUSCULAR
Status: DISCONTINUED | OUTPATIENT
Start: 2025-06-04 | End: 2025-06-05

## 2025-06-04 RX ORDER — SODIUM CHLORIDE 0.9 % (FLUSH) 0.9 %
10 SYRINGE (ML) INJECTION PRN
Status: DISCONTINUED | OUTPATIENT
Start: 2025-06-04 | End: 2025-06-05 | Stop reason: HOSPADM

## 2025-06-04 RX ORDER — METOPROLOL SUCCINATE 50 MG/1
50 TABLET, EXTENDED RELEASE ORAL DAILY
Status: DISCONTINUED | OUTPATIENT
Start: 2025-06-04 | End: 2025-06-05 | Stop reason: HOSPADM

## 2025-06-04 RX ORDER — ONDANSETRON 2 MG/ML
4 INJECTION INTRAMUSCULAR; INTRAVENOUS EVERY 6 HOURS PRN
Status: DISCONTINUED | OUTPATIENT
Start: 2025-06-04 | End: 2025-06-05 | Stop reason: HOSPADM

## 2025-06-04 RX ORDER — LORAZEPAM 2 MG/ML
1 INJECTION INTRAMUSCULAR ONCE
Status: COMPLETED | OUTPATIENT
Start: 2025-06-04 | End: 2025-06-04

## 2025-06-04 RX ORDER — SERTRALINE HYDROCHLORIDE 25 MG/1
50 TABLET, FILM COATED ORAL DAILY
Status: DISCONTINUED | OUTPATIENT
Start: 2025-06-05 | End: 2025-06-04

## 2025-06-04 RX ORDER — LORAZEPAM 1 MG/1
3 TABLET ORAL
Status: DISCONTINUED | OUTPATIENT
Start: 2025-06-04 | End: 2025-06-05

## 2025-06-04 RX ORDER — PANTOPRAZOLE SODIUM 40 MG/1
40 TABLET, DELAYED RELEASE ORAL
Status: DISCONTINUED | OUTPATIENT
Start: 2025-06-05 | End: 2025-06-05 | Stop reason: HOSPADM

## 2025-06-04 RX ORDER — RIVAROXABAN 2.5 MG/1
2.5 TABLET, FILM COATED ORAL 2 TIMES DAILY
Status: DISCONTINUED | OUTPATIENT
Start: 2025-06-04 | End: 2025-06-05 | Stop reason: HOSPADM

## 2025-06-04 RX ORDER — ASPIRIN 81 MG/1
81 TABLET ORAL DAILY
Status: DISCONTINUED | OUTPATIENT
Start: 2025-06-05 | End: 2025-06-04

## 2025-06-04 RX ORDER — ASPIRIN 81 MG/1
81 TABLET ORAL DAILY
Status: DISCONTINUED | OUTPATIENT
Start: 2025-06-04 | End: 2025-06-05 | Stop reason: HOSPADM

## 2025-06-04 RX ORDER — BACLOFEN 10 MG/1
10 TABLET ORAL 3 TIMES DAILY PRN
Status: DISCONTINUED | OUTPATIENT
Start: 2025-06-04 | End: 2025-06-05 | Stop reason: HOSPADM

## 2025-06-04 RX ORDER — SODIUM CHLORIDE 9 MG/ML
INJECTION, SOLUTION INTRAVENOUS CONTINUOUS
Status: DISCONTINUED | OUTPATIENT
Start: 2025-06-04 | End: 2025-06-05

## 2025-06-04 RX ORDER — SODIUM CHLORIDE 9 MG/ML
INJECTION, SOLUTION INTRAVENOUS PRN
Status: DISCONTINUED | OUTPATIENT
Start: 2025-06-04 | End: 2025-06-05 | Stop reason: HOSPADM

## 2025-06-04 RX ORDER — POTASSIUM CHLORIDE 7.45 MG/ML
10 INJECTION INTRAVENOUS PRN
Status: DISCONTINUED | OUTPATIENT
Start: 2025-06-04 | End: 2025-06-05 | Stop reason: HOSPADM

## 2025-06-04 RX ORDER — HYDROCHLOROTHIAZIDE 25 MG/1
12.5 TABLET ORAL DAILY
Status: DISCONTINUED | OUTPATIENT
Start: 2025-06-05 | End: 2025-06-05 | Stop reason: HOSPADM

## 2025-06-04 RX ORDER — LORAZEPAM 2 MG/ML
2 INJECTION INTRAMUSCULAR
Status: DISCONTINUED | OUTPATIENT
Start: 2025-06-04 | End: 2025-06-05

## 2025-06-04 RX ORDER — LORAZEPAM 1 MG/1
1 TABLET ORAL
Status: DISCONTINUED | OUTPATIENT
Start: 2025-06-04 | End: 2025-06-05

## 2025-06-04 RX ORDER — ACETAMINOPHEN 650 MG/1
650 SUPPOSITORY RECTAL EVERY 6 HOURS PRN
Status: DISCONTINUED | OUTPATIENT
Start: 2025-06-04 | End: 2025-06-05 | Stop reason: HOSPADM

## 2025-06-04 RX ORDER — HYDROCHLOROTHIAZIDE 25 MG/1
12.5 TABLET ORAL DAILY
Status: DISCONTINUED | OUTPATIENT
Start: 2025-06-05 | End: 2025-06-04

## 2025-06-04 RX ORDER — LORAZEPAM 2 MG/ML
3 INJECTION INTRAMUSCULAR
Status: DISCONTINUED | OUTPATIENT
Start: 2025-06-04 | End: 2025-06-05

## 2025-06-04 RX ORDER — FAMOTIDINE 20 MG/1
20 TABLET, FILM COATED ORAL 2 TIMES DAILY
Status: DISCONTINUED | OUTPATIENT
Start: 2025-06-04 | End: 2025-06-05 | Stop reason: HOSPADM

## 2025-06-04 RX ORDER — GAUZE BANDAGE 2" X 2"
100 BANDAGE TOPICAL DAILY
Status: DISCONTINUED | OUTPATIENT
Start: 2025-06-05 | End: 2025-06-05 | Stop reason: HOSPADM

## 2025-06-04 RX ORDER — LOSARTAN POTASSIUM 50 MG/1
50 TABLET ORAL DAILY
Status: DISCONTINUED | OUTPATIENT
Start: 2025-06-04 | End: 2025-06-05 | Stop reason: HOSPADM

## 2025-06-04 RX ORDER — LORAZEPAM 2 MG/ML
1 INJECTION INTRAMUSCULAR
Status: DISCONTINUED | OUTPATIENT
Start: 2025-06-04 | End: 2025-06-05

## 2025-06-04 RX ORDER — POTASSIUM CHLORIDE 1500 MG/1
40 TABLET, EXTENDED RELEASE ORAL PRN
Status: DISCONTINUED | OUTPATIENT
Start: 2025-06-04 | End: 2025-06-05 | Stop reason: HOSPADM

## 2025-06-04 RX ORDER — LORAZEPAM 1 MG/1
4 TABLET ORAL
Status: DISCONTINUED | OUTPATIENT
Start: 2025-06-04 | End: 2025-06-05

## 2025-06-04 RX ORDER — ACETAMINOPHEN 325 MG/1
650 TABLET ORAL EVERY 6 HOURS PRN
Status: DISCONTINUED | OUTPATIENT
Start: 2025-06-04 | End: 2025-06-05 | Stop reason: HOSPADM

## 2025-06-04 RX ORDER — SODIUM CHLORIDE 0.9 % (FLUSH) 0.9 %
10 SYRINGE (ML) INJECTION EVERY 12 HOURS SCHEDULED
Status: DISCONTINUED | OUTPATIENT
Start: 2025-06-04 | End: 2025-06-05 | Stop reason: HOSPADM

## 2025-06-04 RX ORDER — LOSARTAN POTASSIUM 50 MG/1
50 TABLET ORAL DAILY
Status: DISCONTINUED | OUTPATIENT
Start: 2025-06-05 | End: 2025-06-04

## 2025-06-04 RX ORDER — POLYETHYLENE GLYCOL 3350 17 G/17G
17 POWDER, FOR SOLUTION ORAL DAILY PRN
Status: DISCONTINUED | OUTPATIENT
Start: 2025-06-04 | End: 2025-06-05 | Stop reason: HOSPADM

## 2025-06-04 RX ORDER — LOSARTAN POTASSIUM AND HYDROCHLOROTHIAZIDE 12.5; 5 MG/1; MG/1
1 TABLET ORAL DAILY
Status: DISCONTINUED | OUTPATIENT
Start: 2025-06-05 | End: 2025-06-04

## 2025-06-04 RX ORDER — LORAZEPAM 1 MG/1
2 TABLET ORAL
Status: DISCONTINUED | OUTPATIENT
Start: 2025-06-04 | End: 2025-06-05

## 2025-06-04 RX ADMIN — SODIUM CHLORIDE: 0.9 INJECTION, SOLUTION INTRAVENOUS at 22:36

## 2025-06-04 RX ADMIN — METOPROLOL SUCCINATE 50 MG: 50 TABLET, EXTENDED RELEASE ORAL at 23:23

## 2025-06-04 RX ADMIN — SERTRALINE HYDROCHLORIDE 50 MG: 25 TABLET ORAL at 23:22

## 2025-06-04 RX ADMIN — LOSARTAN POTASSIUM 50 MG: 50 TABLET, FILM COATED ORAL at 23:23

## 2025-06-04 RX ADMIN — RIVAROXABAN 2.5 MG: 2.5 TABLET, FILM COATED ORAL at 23:22

## 2025-06-04 RX ADMIN — FAMOTIDINE 20 MG: 20 TABLET, FILM COATED ORAL at 23:23

## 2025-06-04 RX ADMIN — LORAZEPAM 1 MG: 2 INJECTION INTRAMUSCULAR; INTRAVENOUS at 18:59

## 2025-06-04 RX ADMIN — THIAMINE HYDROCHLORIDE 100 MG: 100 INJECTION, SOLUTION INTRAMUSCULAR; INTRAVENOUS at 19:09

## 2025-06-04 RX ADMIN — ASPIRIN 81 MG: 81 TABLET, COATED ORAL at 23:25

## 2025-06-04 ASSESSMENT — PAIN DESCRIPTION - PAIN TYPE: TYPE: ACUTE PAIN

## 2025-06-04 ASSESSMENT — ENCOUNTER SYMPTOMS
ANAL BLEEDING: 0
PHOTOPHOBIA: 0
COUGH: 0
ABDOMINAL DISTENTION: 0
ABDOMINAL PAIN: 0

## 2025-06-04 ASSESSMENT — PAIN SCALES - GENERAL
PAINLEVEL_OUTOF10: 5
PAINLEVEL_OUTOF10: 3

## 2025-06-04 ASSESSMENT — LIFESTYLE VARIABLES
HOW MANY STANDARD DRINKS CONTAINING ALCOHOL DO YOU HAVE ON A TYPICAL DAY: 7 TO 9
HOW OFTEN DO YOU HAVE A DRINK CONTAINING ALCOHOL: 4 OR MORE TIMES A WEEK

## 2025-06-04 ASSESSMENT — PAIN DESCRIPTION - ORIENTATION: ORIENTATION: RIGHT;LEFT

## 2025-06-04 ASSESSMENT — PAIN - FUNCTIONAL ASSESSMENT: PAIN_FUNCTIONAL_ASSESSMENT: ACTIVITIES ARE NOT PREVENTED

## 2025-06-04 ASSESSMENT — PAIN DESCRIPTION - DESCRIPTORS: DESCRIPTORS: DISCOMFORT

## 2025-06-04 ASSESSMENT — PAIN DESCRIPTION - LOCATION: LOCATION: HAND

## 2025-06-04 NOTE — ED PROVIDER NOTES
reviewed today. No signs of potential drug abuse or diversion identified.       (Please note that portions of this note were completed with a voice recognition program.  Efforts were made to edit the dictations but occasionally words are mis-transcribed.)    Ramon Mcintyre PA-C (electronically signed)  Attending Emergency Physician    Supervising Attending Physician:  {[email or web address]}.

## 2025-06-04 NOTE — ED NOTES
Patient states that he has had thoughts of ending his life for the past two months, does not have a plan.

## 2025-06-04 NOTE — ED NOTES
Patient oxygen noted to be 79% with good waveform. This RN entered room to assess patient and noted patient to be asleep. Patient easily arousible and does state he has sleep apnea. Patient placed on 2L nasal cannula. Chloe astorga RN notified

## 2025-06-04 NOTE — TRANSFER CENTER NOTE
Transfer Center Handoff for Behavioral Health Transfers      Patient's Current Location: [facility] EMERGENCY DEPARTMENT     Chief Complaint   Patient presents with    Altered Mental Status     Wife brings patient in for altered mental status, reports patient being gone at [facility] by himself drinking for 6 days.When she contacted him today she noticed him having slurred speech and confusion.       Current or History of Violent Behavior: No    Currently in Restraints Now or During this Encounter: No  (Specify if Agitation or self harm is noted in ED?)  If yes, please describe behaviors requiring restraint:             Medical Clearance Documented and Verified in the Chart: Yes    Allergies   Allergen Reactions    Chantix [Varenicline] Other (See Comments)     Chest pain    Lipitor [Atorvastatin] Other (See Comments)     myalgias    Livalo [Pitavastatin] Other (See Comments)     Myalgias         Can Patient Tolerate Lying Flat: Yes    Able to Perform ADLs:  Yes  (Specify if able to ambulate or uses any mobility devices such as cane or walker)  Activity:    Level of Assistance:    Assistive Device:    Miscellaneous Devices:      LABS    CBC:   Lab Results   Component Value Date/Time    WBC 5.8 06/04/2025 03:12 PM    RBC 4.50 06/04/2025 03:12 PM    HGB 14.9 06/04/2025 03:12 PM    HCT 42.9 06/04/2025 03:12 PM    MCV 95.3 06/04/2025 03:12 PM    MCH 33.1 06/04/2025 03:12 PM    MCHC 34.7 06/04/2025 03:12 PM    RDW 12.9 06/04/2025 03:12 PM     06/04/2025 03:12 PM    MPV 10.3 06/04/2025 03:12 PM     CMP:   Lab Results   Component Value Date/Time     06/04/2025 03:12 PM    K 4.1 06/04/2025 03:12 PM    CL 98 06/04/2025 03:12 PM    CO2 21 06/04/2025 03:12 PM    BUN 11 06/04/2025 03:12 PM    CREATININE 0.7 06/04/2025 03:12 PM    GFRAA >60 11/09/2021 08:10 AM    LABGLOM >90 06/04/2025 03:12 PM    LABGLOM >60 09/18/2023 10:22 AM    GLUCOSE 119 06/04/2025 03:12 PM    CALCIUM 9.0 06/04/2025 03:12 PM    BILITOT 0.5

## 2025-06-05 VITALS
HEIGHT: 71 IN | RESPIRATION RATE: 18 BRPM | OXYGEN SATURATION: 97 % | WEIGHT: 224.9 LBS | TEMPERATURE: 97.1 F | DIASTOLIC BLOOD PRESSURE: 95 MMHG | BODY MASS INDEX: 31.48 KG/M2 | HEART RATE: 87 BPM | SYSTOLIC BLOOD PRESSURE: 169 MMHG

## 2025-06-05 LAB
ALBUMIN SERPL-MCNC: 4 G/DL (ref 3.5–5.2)
ALBUMIN/GLOB SERPL: 2 {RATIO} (ref 1–2.5)
ALP SERPL-CCNC: 55 U/L (ref 40–129)
ALT SERPL-CCNC: 31 U/L (ref 10–50)
ANION GAP SERPL CALCULATED.3IONS-SCNC: 10 MMOL/L (ref 9–16)
AST SERPL-CCNC: 44 U/L (ref 10–50)
BASOPHILS # BLD: 0.03 K/UL (ref 0–0.2)
BASOPHILS NFR BLD: 1 % (ref 0–2)
BILIRUB SERPL-MCNC: 1 MG/DL (ref 0–1.2)
BUN SERPL-MCNC: 7 MG/DL (ref 6–20)
BUN/CREAT SERPL: 12 (ref 9–20)
CALCIUM SERPL-MCNC: 8.4 MG/DL (ref 8.6–10.4)
CHLORIDE SERPL-SCNC: 98 MMOL/L (ref 98–107)
CO2 SERPL-SCNC: 26 MMOL/L (ref 20–31)
CREAT SERPL-MCNC: 0.6 MG/DL (ref 0.7–1.2)
EKG ATRIAL RATE: 106 BPM
EKG ATRIAL RATE: 97 BPM
EKG P AXIS: 58 DEGREES
EKG P AXIS: 66 DEGREES
EKG P-R INTERVAL: 168 MS
EKG P-R INTERVAL: 184 MS
EKG Q-T INTERVAL: 352 MS
EKG Q-T INTERVAL: 370 MS
EKG QRS DURATION: 88 MS
EKG QRS DURATION: 90 MS
EKG QTC CALCULATION (BAZETT): 467 MS
EKG QTC CALCULATION (BAZETT): 469 MS
EKG R AXIS: 38 DEGREES
EKG R AXIS: 46 DEGREES
EKG T AXIS: 33 DEGREES
EKG T AXIS: 56 DEGREES
EKG VENTRICULAR RATE: 106 BPM
EKG VENTRICULAR RATE: 97 BPM
EOSINOPHIL # BLD: 0.03 K/UL (ref 0–0.44)
EOSINOPHILS RELATIVE PERCENT: 1 % (ref 1–4)
ERYTHROCYTE [DISTWIDTH] IN BLOOD BY AUTOMATED COUNT: 12.5 % (ref 11.8–14.4)
ETHANOL PERCENT: NORMAL %
ETHANOLAMINE SERPL-MCNC: <10 MG/DL (ref 0–0.08)
GFR, ESTIMATED: >90 ML/MIN/1.73M2
GLUCOSE SERPL-MCNC: 104 MG/DL (ref 74–99)
HCT VFR BLD AUTO: 37.9 % (ref 40.7–50.3)
HGB BLD-MCNC: 13.2 G/DL (ref 13–17)
IMM GRANULOCYTES # BLD AUTO: <0.03 K/UL (ref 0–0.3)
IMM GRANULOCYTES NFR BLD: 0 %
LYMPHOCYTES NFR BLD: 1.41 K/UL (ref 1.1–3.7)
LYMPHOCYTES RELATIVE PERCENT: 30 % (ref 24–43)
MCH RBC QN AUTO: 32.9 PG (ref 25.2–33.5)
MCHC RBC AUTO-ENTMCNC: 34.8 G/DL (ref 28.4–34.8)
MCV RBC AUTO: 94.5 FL (ref 82.6–102.9)
MONOCYTES NFR BLD: 0.39 K/UL (ref 0.1–1.2)
MONOCYTES NFR BLD: 8 % (ref 3–12)
NEUTROPHILS NFR BLD: 60 % (ref 36–65)
NEUTS SEG NFR BLD: 2.84 K/UL (ref 1.5–8.1)
NRBC BLD-RTO: 0 PER 100 WBC
PLATELET # BLD AUTO: 150 K/UL (ref 138–453)
PMV BLD AUTO: 10.1 FL (ref 8.1–13.5)
POTASSIUM SERPL-SCNC: 3.7 MMOL/L (ref 3.7–5.3)
PROT SERPL-MCNC: 6.1 G/DL (ref 6.6–8.7)
RBC # BLD AUTO: 4.01 M/UL (ref 4.21–5.77)
SODIUM SERPL-SCNC: 134 MMOL/L (ref 136–145)
WBC OTHER # BLD: 4.7 K/UL (ref 3.5–11.3)

## 2025-06-05 PROCEDURE — 6370000000 HC RX 637 (ALT 250 FOR IP): Performed by: INTERNAL MEDICINE

## 2025-06-05 PROCEDURE — 36415 COLL VENOUS BLD VENIPUNCTURE: CPT

## 2025-06-05 PROCEDURE — 6370000000 HC RX 637 (ALT 250 FOR IP)

## 2025-06-05 PROCEDURE — 93010 ELECTROCARDIOGRAM REPORT: CPT | Performed by: FAMILY MEDICINE

## 2025-06-05 PROCEDURE — 94761 N-INVAS EAR/PLS OXIMETRY MLT: CPT

## 2025-06-05 PROCEDURE — G0480 DRUG TEST DEF 1-7 CLASSES: HCPCS

## 2025-06-05 PROCEDURE — 80053 COMPREHEN METABOLIC PANEL: CPT

## 2025-06-05 PROCEDURE — 85025 COMPLETE CBC W/AUTO DIFF WBC: CPT

## 2025-06-05 RX ADMIN — HYDROCHLOROTHIAZIDE 12.5 MG: 25 TABLET ORAL at 08:14

## 2025-06-05 RX ADMIN — ASPIRIN 81 MG: 81 TABLET, COATED ORAL at 08:14

## 2025-06-05 RX ADMIN — Medication 100 MG: at 08:13

## 2025-06-05 RX ADMIN — FAMOTIDINE 20 MG: 20 TABLET, FILM COATED ORAL at 08:14

## 2025-06-05 RX ADMIN — METOPROLOL SUCCINATE 50 MG: 50 TABLET, EXTENDED RELEASE ORAL at 08:14

## 2025-06-05 RX ADMIN — LOSARTAN POTASSIUM 50 MG: 50 TABLET, FILM COATED ORAL at 08:13

## 2025-06-05 RX ADMIN — PANTOPRAZOLE SODIUM 40 MG: 40 TABLET, DELAYED RELEASE ORAL at 08:13

## 2025-06-05 RX ADMIN — SERTRALINE HYDROCHLORIDE 50 MG: 25 TABLET ORAL at 08:14

## 2025-06-05 RX ADMIN — RIVAROXABAN 2.5 MG: 2.5 TABLET, FILM COATED ORAL at 08:13

## 2025-06-05 ASSESSMENT — PAIN SCALES - GENERAL
PAINLEVEL_OUTOF10: 0
PAINLEVEL_OUTOF10: 0

## 2025-06-05 ASSESSMENT — PAIN SCALES - WONG BAKER: WONGBAKER_NUMERICALRESPONSE: NO HURT

## 2025-06-05 NOTE — PROGRESS NOTES
Progress Note    SUBJECTIVE:    Patient seen for f/u of Suicidal ideation.  He resting in bed no distress. Stated he feels that not taking the Zoloft for 2 weeks caused some of his issues as he has been on it for 4 years.  Stated that the anniversary of his parents death is around this time.  Stated he had been drinking and knows that is not a good combination.   No SI/HI thoughts or plans.     ROS:   Constitutional: negative  for fevers, and negative for chills.  Respiratory: negative for shortness of breath, negative for cough, and negative for wheezing  Cardiovascular: negative for chest pain, and negative for palpitations  Gastrointestinal: negative for abdominal pain, negative for nausea,negative for vomiting, negative for diarrhea, and negative for constipation     All other systems were reviewed with the patient and are negative unless otherwise stated in HPI      OBJECTIVE:      Vitals:   Vitals:    06/05/25 0656   BP: (!) 169/95   Pulse: 87   Resp: 18   Temp: 97.1 °F (36.2 °C)   SpO2: 97%     Weight - Scale: 102 kg (224 lb 14.4 oz)   Height: 180.3 cm (5' 11\")     Weight  Wt Readings from Last 3 Encounters:   06/04/25 102 kg (224 lb 14.4 oz)   11/19/24 102.3 kg (225 lb 9.6 oz)   10/14/24 103.1 kg (227 lb 3.2 oz)     Body mass index is 31.37 kg/m².    24HR INTAKE/OUTPUT:      Intake/Output Summary (Last 24 hours) at 6/5/2025 0750  Last data filed at 6/5/2025 0655  Gross per 24 hour   Intake 1876.37 ml   Output 980 ml   Net 896.37 ml     -----------------------------------------------------------------  Exam:    GEN:    Awake, alert and oriented x3.   EYES:  EOMI, pupils equal   NECK: Supple. No lymphadenopathy.  No carotid bruit  CVS:    regular rate and rhythm, no audible murmur  PULM:  CTA, no wheezes, rales or rhonchi, no acute respiratory distress  ABD:    Bowels sounds normal.  Abdomen is soft.  No distention.  no tenderness to palpation.   EXT:   no edema bilaterally .  No calf tenderness.   NEURO:

## 2025-06-05 NOTE — ED NOTES
Pink slip not completed at this time due to pt's ETOH level. Provider will reevaluate when pt's ETOH level <0.080.

## 2025-06-05 NOTE — H&P
Date Taking? Authorizing Provider   sertraline (ZOLOFT) 50 MG tablet Take 1 tablet by mouth daily 5/30/25 11/26/25 Yes Mary Milligan APRN - CNP   nebivolol (BYSTOLIC) 10 MG tablet Take 1 tablet by mouth daily 2/27/25  Yes Lisseth Ribeiro APRN - CNP   XARELTO 2.5 MG TABS tablet TAKE ONE TABLET BY MOUTH TWICE A DAY 9/26/24  Yes Cesar Bey MD   albuterol (PROVENTIL) (2.5 MG/3ML) 0.083% nebulizer solution USE THREE MILLILITERS (1 VIAL) VIA NEBULIZATION BY MOUTH EVERY 4 HOURS AS NEEDED FOR WHEEZING OR SHORTNESS OF BREATH 8/7/24  Yes Dionna Ferrera APRN - CNP   albuterol sulfate HFA (VENTOLIN HFA) 108 (90 Base) MCG/ACT inhaler Inhale 2 puffs into the lungs 4 times daily as needed for Wheezing 8/7/24  Yes Dionna Ferrera APRN - CNP   ASPIRIN LOW DOSE 81 MG EC tablet TAKE ONE TABLET BY MOUTH ONCE A DAY 3/18/24  Yes Amber Guzman PA-C   losartan-hydroCHLOROthiazide (HYZAAR) 50-12.5 MG per tablet TAKE ONE TABLET BY MOUTH ONCE A DAY 3/18/24  Yes Amber Guzman PA-C   baclofen (LIORESAL) 10 MG tablet Take 1 tablet by mouth 3 times daily as needed (muscle spasm) 6/28/22  Yes Dionna Ferrera APRN - CNP   Multiple Vitamins-Minerals (CENTRUM ADULTS PO) Take 1 tablet by mouth daily   Yes ProviderFaiza MD   esomeprazole Magnesium (NEXIUM) 40 MG PACK Take 1 packet by mouth daily   Yes ProviderFaiza MD   Semaglutide-Weight Management (WEGOVY) 0.25 MG/0.5ML SOAJ SC injection Inject 0.25 mg into the skin every 7 days 12/6/24 3/28/25  Dionna Ferrera APRN - CNP   Tirzepatide 2.5 MG/0.5ML SOAJ Inject 2.5 mg into the skin once a week Lot [id number] exp 6/26/2023  Patient not taking: Reported on 6/4/2025 11/19/24 11/19/25  Dionna Ferrera, APRN - CNP   Tirzepatide 5 MG/0.5ML SOAJ Inject 5 mg into the skin once a week  Patient not taking: Reported on 6/4/2025 11/19/24 11/19/25  Dionna Ferrera, APRN - CNP   rosuvastatin (CRESTOR) 10 MG tablet Take 1 tablet by mouth daily  Patient not taking: Reported on

## 2025-06-05 NOTE — PLAN OF CARE
Problem: Discharge Planning  Goal: Discharge to home or other facility with appropriate resources  Outcome: Progressing  Flowsheets (Taken 6/5/2025 0834)  Discharge to home or other facility with appropriate resources: Identify barriers to discharge with patient and caregiver     Problem: Safety - Adult  Goal: Free from fall injury  Outcome: Progressing  Flowsheets (Taken 6/5/2025 0834)  Free From Fall Injury: Instruct family/caregiver on patient safety     Problem: Risk for Elopement  Goal: Patient will not exit the unit/facility without proper excort  Outcome: Progressing  Flowsheets  Taken 6/5/2025 0834 by Justina Carmen, RN  Nursing Interventions for Elopement Risk: Collaborate with family members/caregivers to mitigate the elopement risk  Taken 6/4/2025 2231 by Mariah Morgan, RN  Nursing Interventions for Elopement Risk: Collaborate with treatment team for drug withdrawal symptoms treatment     Problem: Pain  Goal: Verbalizes/displays adequate comfort level or baseline comfort level  Outcome: Progressing  Flowsheets (Taken 6/5/2025 0834)  Verbalizes/displays adequate comfort level or baseline comfort level:   Encourage patient to monitor pain and request assistance   Assess pain using appropriate pain scale      no

## 2025-06-05 NOTE — DISCHARGE SUMMARY
Discharge Summary    James Pratt  :  1970  MRN:  [id number]    Admit date:  2025      Discharge date: 2025     Admitting Physician:  Maciej Lopez MD    Discharge Diagnoses:    Principal Problem:    Suicidal ideation - [facility] refused transfer  Active Problems:    Hypertension    CAD (coronary artery disease)    COPD (chronic obstructive pulmonary disease) (HCC)    Alcohol abuse    Severe episode of recurrent major depressive disorder (HCC)  Resolved Problems:    * No resolved hospital problems. *      Hospital Course:   James Pratt is a 55 y.o. male admitted with suicidal ideation. He presented with family with alcohol use and suicidal thoughts. Patient reported that he was drinking several days including day prior to arrival.  When awakening with somewhat slurred speech but thought it was due to a dry mouth.  Family was concerned and brought him for evaluation.  He reports normally he drinks 3-4 times a week and drinks about hald of a fifth of vodka when he does.  He denied history of alcohol withdrawal in the past.  He reported he had been on Zoloft for 4 years however ran out and stopped cold turkey 2 weeks ago.  Stated that his provider left and had noone to refill for him.  He reported thoughts of hurting himself but had no plans.  He reported both his parents  2 years ago around this time. He reported he has been struggling.  He was evaluated in the ER and since he was tachycardic at 104 and HTN upon arrival  [facility] wanted him to be medically cleared and could reevaluate in the morning if needed.  Patient was monitored over night but no further concerns. No further thoughts of SI and No HI.  Plan will be to discharge today.  I will send for 30 days of [facility]    Consultants:  none    Procedures: none    Complications: none    Discharge Condition: fair    Exam:  GEN:    Awake, alert and oriented x3.   EYES:   EOMI, pupils equal   NECK: Supple. No lymphadenopathy.

## 2025-06-05 NOTE — PROGRESS NOTES
Discharge instructions reviewed with pt. Pt aware of  prescription to  at [facility] in [place]. Pt given paperwork to fill out for Dr. Lopez office.  All questions answered. Will escort pt to private car shortly once wife arrives.

## 2025-06-05 NOTE — PROGRESS NOTES
Ambulated patient to restroom at this time. Denies any suicidal/homicidal ideation. Pain now 2/10, but states hand pain is chronic. CIWA score revaluated at 0, hand tremors have subsided. Patient denies any needs, call light within reach, 1:1 sitter in place, socks on, & bed alarm on.

## 2025-06-05 NOTE — PROGRESS NOTES
Writer to bedside to complete morning assessment. Upon entry to room, pt laying in bed, respirations even and unlabored while on room air. Vitals obtained and assessment completed, see flow sheet for details. Pt denies any suicidal ideation at this time. CIWA score of 0 at this time. Sitter remains at bedside.  Pt denies needs from writer at this time. Call light in reach.  Care ongoing.

## 2025-06-05 NOTE — CARE COORDINATION
Case Management Assessment  Initial Evaluation    Date/Time of Evaluation: 6/5/2025 11:50 AM  Assessment Completed by: JORDAN Henry, ADAW    If patient is discharged prior to next notation, then this note serves as note for discharge by case management.    Patient Name: James Pratt                   YOB: 1970  Diagnosis: Suicidal ideation [R45.851]  Acute alcoholic intoxication without complication [F10.920]  Depression, unspecified depression type [F32.A]                   Date / Time: 6/4/2025  2:41 PM    Patient Admission Status: Inpatient   Readmission Risk (Low < 19, Mod (19-27), High > 27): Readmission Risk Score: 10.1    Current PCP: Dionna Ferrera, GILMA - CNP  PCP verified by CM? Yes (DARLENE provided paperwork to Dr Lopez office as pt would like to follow with him as PCP. Pt's last PCP was in [place] and he doesn't want to drive that far any longer.)    Chart Reviewed: Yes      History Provided by: Patient, Medical Record  Patient Orientation: Alert and Oriented, Person, Place, Situation    Patient Cognition: Alert    Hospitalization in the last 30 days (Readmission):  No    If yes, Readmission Assessment in CM Navigator will be completed.    Advance Directives:      Code Status: Full Code   Patient's Primary Decision Maker is: Legal Next of Kin    Primary Decision Maker: Sandra Pratt - Spouse - [phone]    Discharge Planning:    Patient lives with: Spouse/Significant Other, Children Type of Home: House  Primary Care Giver: Self  Patient Support Systems include: Spouse/Significant Other, Children   Current Financial resources: Other (Comment) (commercial [employer])  Current community resources: None  Current services prior to admission: Durable Medical Equipment, C-pap            Current DME: Home Aerosol, Cpap            Type of Home Care services:  None    ADLS  Prior functional level: Independent in ADLs/IADLs  Current functional level: Independent in ADLs/IADLs    PT AM-PAC:

## 2025-06-05 NOTE — PROGRESS NOTES
Patient alert & oriented x 4, noticeable moderate tremors & pain to the hands. CIWA score of 7, states he drinks a 1/2 L \"or so\" about 3 times a week. After reviewing PTA meds, patient states that he hasn't been able to take his Zoloft \"for a few weeks, due to no prescription.\" Patient states that he felt like he \"was having a mental breakdown.\" Patient states that his primary care NP, he no longer sees, so he hasn't been able to get a refill. ROSITA Lemus notified. Patient on 1:1 sitter, denies any suicidal & homicidal ideation, states he has had some family loss & has felt down intermittently increasing in the last few weeks. Per patient request, placed as a confidential encounter, states his wife to be the only visitor/. Patient resting in bed, eyes open, call light within reach. Denies any concerns at this time.

## 2025-06-05 NOTE — PROGRESS NOTES
Nutrition Assessment     Type and Reason for Visit: Initial    Nutrition Recommendations/Plan:   Encourage healthy habits  Discourage any ETOH (pancreatitis history)     Malnutrition Assessment:  Malnutrition Status: No malnutrition    Nutrition Assessment:  No nutrition diagnosis at this time. Good appetite and intakes post ETOH intoxication and depression. Feeling much better with planned discharge to home this date. Denies nutrition concerns.      Nutrition Related Findings:   active b/s. +1 BLE edema. Wound Type: None    Current Nutrition Therapies:    ADULT DIET; Regular    Anthropometric Measures:  Height: 180.3 cm (5' 11\")  Current Body Wt: 102 kg (224 lb 14.4 oz)   BMI: 31.4        Nutrition Diagnosis:   No nutrition diagnosis at this time       Lab Results   Component Value Date     (L) 06/05/2025    K 3.7 06/05/2025    CL 98 06/05/2025    CO2 26 06/05/2025    BUN 7 06/05/2025    CREATININE 0.6 (L) 06/05/2025    GLUCOSE 104 (H) 06/05/2025    CALCIUM 8.4 (L) 06/05/2025    BILITOT 1.0 06/05/2025    ALKPHOS 55 06/05/2025    AST 44 06/05/2025    ALT 31 06/05/2025    LABGLOM >90 06/05/2025    GFRAA >60 11/09/2021    GLOB NOT REPORTED 08/16/2019     Hemoglobin A1C   Date Value Ref Range Status   09/18/2023 6.9 (H) 4.0 - 6.0 % Final     Lab Results   Component Value Date    VITD25 42.0 09/18/2023       Nutrition Interventions:   Food and/or Nutrient Delivery: Continue Current Diet  Nutrition Education/Counseling: No recommendation at this time  Coordination of Nutrition Care: Continue to monitor while inpatient  Plan of Care discussed with: patient    Goals:  Goals: Meet at least 75% of estimated needs  Type of Goal: New goal  Previous Goal Met: New Goal    Nutrition Monitoring and Evaluation:   Behavioral-Environmental Outcomes: None Identified  Food/Nutrient Intake Outcomes: Food and Nutrient Intake  Physical Signs/Symptoms Outcomes: Biochemical Data, Weight    Discharge Planning:    No discharge needs at

## 2025-06-06 ENCOUNTER — CARE COORDINATION (OUTPATIENT)
Dept: OTHER | Facility: CLINIC | Age: 55
End: 2025-06-06

## 2025-06-06 NOTE — CARE COORDINATION
Care Transitions Note    Initial Call - Call within 2 business days of discharge: Yes    Attempted to reach patient for transitions of care follow up. Unable to reach patient.    Outreach Attempts:   HIPAA compliant voicemail left for patient.     Patient: James Pratt    Patient : 1970   MRN: [id number]    Reason for Admission:   Discharge Date: 25  RURS: Readmission Risk Score: 10.1    Last Discharge Facility       Date Complaint Diagnosis Description Type Department Provider    25 Altered Mental Status Depression, unspecified depression type ... ED to Hosp-Admission (Discharged) (ADMITTED) M[facility] DEIRDREU Maciej Lopez MD            Was this an external facility discharge? No    Follow Up Appointment:     Future Appointments         Provider Specialty Dept Phone    10/20/2025 9:20 AM Cesar Bey MD Cardiology [phone]            Plan for follow-up on next business day.        Hollie Gruber LPN- Care Coordinator  Associate Care Management  [address] [place] [address]  [address]  Phone: [phone]  [email or web address]

## 2025-06-09 ENCOUNTER — CARE COORDINATION (OUTPATIENT)
Dept: OTHER | Facility: CLINIC | Age: 55
End: 2025-06-09

## 2025-06-09 NOTE — CARE COORDINATION
Ambulatory Care Coordination Note     2025 10:15 AM     Patient Current Location:  Home: [address] [place] [id number]     This patient was received as a referral from Ambulatory Care Manager .    ACM contacted the patient by telephone. Verified name and  with patient as identifiers. Provided introduction to self, and explanation of the ACM role.   Patient accepted care management services at this time.          ACM: Hollie Gruber LPN     Challenges to be reviewed by the provider   Additional needs identified to be addressed with provider No                  Method of communication with provider: none.    Utilization: Initial Call - N/A    Care Summary Note: Acm spoke to patient following his discharge from the hospital last week. He states he has been doing well, has great support from family at home. Compliant with Zoloft, he was given #30 upon discharge, but he did find another bottle of Zoloft he recently filled earlier this year. He is looking to establish care with PCP Dr Lopez. He is waiting for a call back from the office to schedule. ACM to ensure patient has been scheduled in a timely manner prior to needing refills.Patient denies questions or concerns at this time.     Assessments Completed:   General Assessment              Medications Reviewed:   Completed during this call    Advance Care Planning:   Not reviewed during this call     Care Planning:    Goals Addressed                   This Visit's Progress     Behavioral Health        I will work towards the following Behavioral Health goals: I will schedule my follow up appointment with my primary care physician when I leave the office today. and I will take my medications daily as prescribed.    Barriers: PCP wait time for new patient appt  Plan for overcoming my barriers: ACM to follow up to ensure he has been scheduled.  Confidence: 9/10  Anticipated Goal Completion Date: 25               PCP/Specialist follow up:   Future

## 2025-06-27 ENCOUNTER — CARE COORDINATION (OUTPATIENT)
Dept: OTHER | Facility: CLINIC | Age: 55
End: 2025-06-27

## 2025-06-27 NOTE — CARE COORDINATION
Ambulatory Care Coordination Note  ACM: Hollie Gruber LPN    ACM sent [employer] message for Associate Care Management follow up related to PCP appt. See patient message for details and response (as appropriate).         PCP/Specialist follow up:   Future Appointments         Provider Specialty Dept Phone    10/20/2025 9:20 AM Cesar Bey MD Cardiology [phone]            Follow Up:   Plan for next ACM outreach in approximately 1 week to complete:  - follow up appointment with PCP.   Patient  is agreeable to this plan.     Hollie Gruber LPN- Care Coordinator  Associate Care Management  [address], [place]  [address]  Phone: [phone]  [email or web address]

## 2025-07-14 PROBLEM — E11.9 TYPE 2 DIABETES MELLITUS WITHOUT COMPLICATION, WITHOUT LONG-TERM CURRENT USE OF INSULIN (HCC): Status: ACTIVE | Noted: 2025-07-14

## 2025-07-28 ENCOUNTER — CARE COORDINATION (OUTPATIENT)
Dept: OTHER | Facility: CLINIC | Age: 55
End: 2025-07-28

## 2025-08-15 ENCOUNTER — HOSPITAL ENCOUNTER (OUTPATIENT)
Dept: LAB | Age: 55
Discharge: HOME | End: 2025-08-15
Payer: COMMERCIAL

## 2025-08-15 DIAGNOSIS — R05.1 ACUTE COUGH: ICD-10-CM

## 2025-08-15 DIAGNOSIS — R06.2 WHEEZE: ICD-10-CM

## 2025-08-15 DIAGNOSIS — J21.9 ACUTE BRONCHIOLITIS DUE TO UNSPECIFIED ORGANISM: ICD-10-CM

## 2025-08-15 DIAGNOSIS — E78.1 HYPERTRIGLYCERIDEMIA: Chronic | ICD-10-CM

## 2025-08-15 DIAGNOSIS — J44.9 CHRONIC OBSTRUCTIVE PULMONARY DISEASE, UNSPECIFIED COPD TYPE (HCC): ICD-10-CM

## 2025-08-15 DIAGNOSIS — I10 ESSENTIAL HYPERTENSION: ICD-10-CM

## 2025-08-15 DIAGNOSIS — R73.03 PREDIABETES: ICD-10-CM

## 2025-08-15 LAB
ALBUMIN SERPL-MCNC: 4.4 G/DL (ref 3.5–5.2)
ALBUMIN/GLOB SERPL: 1.5 {RATIO} (ref 1–2.5)
ALP SERPL-CCNC: 49 U/L (ref 40–129)
ALT SERPL-CCNC: 27 U/L (ref 10–50)
ANION GAP SERPL CALCULATED.3IONS-SCNC: 12 MMOL/L (ref 9–16)
AST SERPL-CCNC: 22 U/L (ref 10–50)
BILIRUB SERPL-MCNC: 0.4 MG/DL (ref 0–1.2)
BUN SERPL-MCNC: 11 MG/DL (ref 6–20)
BUN/CREAT SERPL: 16 (ref 9–20)
CALCIUM SERPL-MCNC: 9.9 MG/DL (ref 8.6–10.4)
CHLORIDE SERPL-SCNC: 101 MMOL/L (ref 98–107)
CHOLEST SERPL-MCNC: 133 MG/DL (ref 0–199)
CHOLESTEROL/HDL RATIO: 2.1
CO2 SERPL-SCNC: 26 MMOL/L (ref 20–31)
CREAT SERPL-MCNC: 0.7 MG/DL (ref 0.7–1.2)
ERYTHROCYTE [DISTWIDTH] IN BLOOD BY AUTOMATED COUNT: 12.7 % (ref 11.8–14.4)
EST. AVERAGE GLUCOSE BLD GHB EST-MCNC: 143 MG/DL
GFR, ESTIMATED: >90 ML/MIN/1.73M2
GLUCOSE SERPL-MCNC: 161 MG/DL (ref 74–99)
HBA1C MFR BLD: 6.6 % (ref 4–6)
HCT VFR BLD AUTO: 44.1 % (ref 40.7–50.3)
HDLC SERPL-MCNC: 62 MG/DL
HGB BLD-MCNC: 14.6 G/DL (ref 13–17)
LDLC SERPL CALC-MCNC: 51 MG/DL (ref 0–100)
MCH RBC QN AUTO: 32.2 PG (ref 25.2–33.5)
MCHC RBC AUTO-ENTMCNC: 33.1 G/DL (ref 28.4–34.8)
MCV RBC AUTO: 97.1 FL (ref 82.6–102.9)
NRBC BLD-RTO: 0 PER 100 WBC
PLATELET # BLD AUTO: 177 K/UL (ref 138–453)
PMV BLD AUTO: 10.2 FL (ref 8.1–13.5)
POTASSIUM SERPL-SCNC: 4.4 MMOL/L (ref 3.7–5.3)
PROT SERPL-MCNC: 7.5 G/DL (ref 6.6–8.7)
RBC # BLD AUTO: 4.54 M/UL (ref 4.21–5.77)
SODIUM SERPL-SCNC: 139 MMOL/L (ref 136–145)
TRIGL SERPL-MCNC: 98 MG/DL
VLDLC SERPL CALC-MCNC: 20 MG/DL (ref 1–30)
WBC OTHER # BLD: 10.9 K/UL (ref 3.5–11.3)

## 2025-08-15 PROCEDURE — 83036 HEMOGLOBIN GLYCOSYLATED A1C: CPT

## 2025-08-15 PROCEDURE — 80053 COMPREHEN METABOLIC PANEL: CPT

## 2025-08-15 PROCEDURE — 80061 LIPID PANEL: CPT

## 2025-08-15 PROCEDURE — 85027 COMPLETE CBC AUTOMATED: CPT

## 2025-08-15 PROCEDURE — 36415 COLL VENOUS BLD VENIPUNCTURE: CPT

## 2025-08-20 RX ORDER — ALBUTEROL SULFATE 0.83 MG/ML
SOLUTION RESPIRATORY (INHALATION)
Qty: 300 ML | Refills: 1 | Status: SHIPPED | OUTPATIENT
Start: 2025-08-20

## 2025-08-20 RX ORDER — ALBUTEROL SULFATE 90 UG/1
2 INHALANT RESPIRATORY (INHALATION) 4 TIMES DAILY PRN
Qty: 18 G | Refills: 0 | Status: SHIPPED | OUTPATIENT
Start: 2025-08-20

## 2025-08-21 PROBLEM — I46.9 CARDIAC ARREST (HCC): Status: RESOLVED | Noted: 2021-12-02 | Resolved: 2025-08-21

## 2025-08-28 ENCOUNTER — CARE COORDINATION (OUTPATIENT)
Dept: OTHER | Facility: CLINIC | Age: 55
End: 2025-08-28

## (undated) DEVICE — SNARE ENDOSCP L240CM SHTH DIA2.4MM LOOP W20MM MIN WRK CHN

## (undated) DEVICE — PERRYSBURG ENDO PACK: Brand: MEDLINE INDUSTRIES, INC.

## (undated) DEVICE — ADAPTER CLEANING PORPOISE CLEANING

## (undated) DEVICE — Device: Brand: DEFENDO VALVE AND CONNECTOR KIT

## (undated) DEVICE — FORCEPS BX L240CM JAW DIA2.4MM ORNG L CAP W/ NDL DISP RAD

## (undated) DEVICE — ERBE NESSY®PLATE 170 SPLIT; 168CM²; CABLE 3M: Brand: ERBE

## (undated) DEVICE — GLOVE ORANGE PI 7   MSG9070

## (undated) DEVICE — POLYP TRAP: Brand: TRAPEASE®